# Patient Record
Sex: FEMALE | Race: BLACK OR AFRICAN AMERICAN | Employment: FULL TIME | URBAN - METROPOLITAN AREA
[De-identification: names, ages, dates, MRNs, and addresses within clinical notes are randomized per-mention and may not be internally consistent; named-entity substitution may affect disease eponyms.]

---

## 2020-01-13 ENCOUNTER — HOSPITAL ENCOUNTER (INPATIENT)
Age: 60
LOS: 8 days | Discharge: HOME | DRG: 287 | End: 2020-01-21
Attending: EMERGENCY MEDICINE | Admitting: INTERNAL MEDICINE
Payer: COMMERCIAL

## 2020-01-13 ENCOUNTER — APPOINTMENT (OUTPATIENT)
Dept: CT IMAGING | Age: 60
DRG: 287 | End: 2020-01-13
Payer: COMMERCIAL

## 2020-01-13 ENCOUNTER — APPOINTMENT (OUTPATIENT)
Dept: GENERAL RADIOLOGY | Age: 60
DRG: 287 | End: 2020-01-13
Payer: COMMERCIAL

## 2020-01-13 PROBLEM — J90 PLEURAL EFFUSION ON RIGHT: Status: ACTIVE | Noted: 2020-01-13

## 2020-01-13 PROBLEM — R03.0 ELEVATED BLOOD PRESSURE READING WITHOUT DIAGNOSIS OF HYPERTENSION: Status: ACTIVE | Noted: 2020-01-13

## 2020-01-13 PROBLEM — R73.9 HYPERGLYCEMIA: Status: ACTIVE | Noted: 2020-01-13

## 2020-01-13 PROBLEM — I50.9 ADMITTED FOR ACUTE CONGESTIVE HEART FAILURE (HCC): Status: ACTIVE | Noted: 2020-01-13

## 2020-01-13 LAB
ANION GAP SERPL CALCULATED.3IONS-SCNC: 15 MMOL/L (ref 7–16)
BASOPHILS ABSOLUTE: 0.04 E9/L (ref 0–0.2)
BASOPHILS RELATIVE PERCENT: 0.4 % (ref 0–2)
BUN BLDV-MCNC: 11 MG/DL (ref 6–20)
CALCIUM SERPL-MCNC: 9.2 MG/DL (ref 8.6–10.2)
CHLORIDE BLD-SCNC: 107 MMOL/L (ref 98–107)
CK MB: 3.9 NG/ML (ref 0–4.3)
CO2: 21 MMOL/L (ref 22–29)
CREAT SERPL-MCNC: 0.8 MG/DL (ref 0.5–1)
D DIMER: 280 NG/ML DDU
EKG ATRIAL RATE: 93 BPM
EKG P AXIS: 85 DEGREES
EKG P-R INTERVAL: 164 MS
EKG Q-T INTERVAL: 418 MS
EKG QRS DURATION: 140 MS
EKG QTC CALCULATION (BAZETT): 519 MS
EKG R AXIS: 134 DEGREES
EKG T AXIS: 2 DEGREES
EKG VENTRICULAR RATE: 93 BPM
EOSINOPHILS ABSOLUTE: 0.06 E9/L (ref 0.05–0.5)
EOSINOPHILS RELATIVE PERCENT: 0.7 % (ref 0–6)
GFR AFRICAN AMERICAN: >60
GFR NON-AFRICAN AMERICAN: >60 ML/MIN/1.73
GLUCOSE BLD-MCNC: 128 MG/DL (ref 74–99)
HBA1C MFR BLD: 5.6 % (ref 4–5.6)
HCT VFR BLD CALC: 41.4 % (ref 34–48)
HEMOGLOBIN: 13.3 G/DL (ref 11.5–15.5)
IMMATURE GRANULOCYTES #: 0.05 E9/L
IMMATURE GRANULOCYTES %: 0.5 % (ref 0–5)
LYMPHOCYTES ABSOLUTE: 1.22 E9/L (ref 1.5–4)
LYMPHOCYTES RELATIVE PERCENT: 13.2 % (ref 20–42)
MCH RBC QN AUTO: 28.4 PG (ref 26–35)
MCHC RBC AUTO-ENTMCNC: 32.1 % (ref 32–34.5)
MCV RBC AUTO: 88.3 FL (ref 80–99.9)
MONOCYTES ABSOLUTE: 0.47 E9/L (ref 0.1–0.95)
MONOCYTES RELATIVE PERCENT: 5.1 % (ref 2–12)
NEUTROPHILS ABSOLUTE: 7.37 E9/L (ref 1.8–7.3)
NEUTROPHILS RELATIVE PERCENT: 80.1 % (ref 43–80)
PDW BLD-RTO: 15.1 FL (ref 11.5–15)
PLATELET # BLD: 327 E9/L (ref 130–450)
PMV BLD AUTO: 10.7 FL (ref 7–12)
POTASSIUM REFLEX MAGNESIUM: 4 MMOL/L (ref 3.5–5)
PRO-BNP: 6998 PG/ML (ref 0–125)
RBC # BLD: 4.69 E12/L (ref 3.5–5.5)
SODIUM BLD-SCNC: 143 MMOL/L (ref 132–146)
TOTAL CK: 90 U/L (ref 20–180)
TROPONIN: <0.01 NG/ML (ref 0–0.03)
TROPONIN: <0.01 NG/ML (ref 0–0.03)
WBC # BLD: 9.2 E9/L (ref 4.5–11.5)

## 2020-01-13 PROCEDURE — 2140000000 HC CCU INTERMEDIATE R&B

## 2020-01-13 PROCEDURE — 85025 COMPLETE CBC W/AUTO DIFF WBC: CPT

## 2020-01-13 PROCEDURE — 80048 BASIC METABOLIC PNL TOTAL CA: CPT

## 2020-01-13 PROCEDURE — 6370000000 HC RX 637 (ALT 250 FOR IP): Performed by: EMERGENCY MEDICINE

## 2020-01-13 PROCEDURE — 36415 COLL VENOUS BLD VENIPUNCTURE: CPT

## 2020-01-13 PROCEDURE — 6360000002 HC RX W HCPCS: Performed by: INTERNAL MEDICINE

## 2020-01-13 PROCEDURE — 2580000003 HC RX 258: Performed by: EMERGENCY MEDICINE

## 2020-01-13 PROCEDURE — 71046 X-RAY EXAM CHEST 2 VIEWS: CPT

## 2020-01-13 PROCEDURE — 93005 ELECTROCARDIOGRAM TRACING: CPT | Performed by: EMERGENCY MEDICINE

## 2020-01-13 PROCEDURE — 82550 ASSAY OF CK (CPK): CPT

## 2020-01-13 PROCEDURE — 94640 AIRWAY INHALATION TREATMENT: CPT

## 2020-01-13 PROCEDURE — 6360000004 HC RX CONTRAST MEDICATION: Performed by: RADIOLOGY

## 2020-01-13 PROCEDURE — 6360000002 HC RX W HCPCS: Performed by: EMERGENCY MEDICINE

## 2020-01-13 PROCEDURE — 99222 1ST HOSP IP/OBS MODERATE 55: CPT | Performed by: INTERNAL MEDICINE

## 2020-01-13 PROCEDURE — 83036 HEMOGLOBIN GLYCOSYLATED A1C: CPT

## 2020-01-13 PROCEDURE — 93010 ELECTROCARDIOGRAM REPORT: CPT | Performed by: INTERNAL MEDICINE

## 2020-01-13 PROCEDURE — 83880 ASSAY OF NATRIURETIC PEPTIDE: CPT

## 2020-01-13 PROCEDURE — 71275 CT ANGIOGRAPHY CHEST: CPT

## 2020-01-13 PROCEDURE — 84484 ASSAY OF TROPONIN QUANT: CPT

## 2020-01-13 PROCEDURE — 94664 DEMO&/EVAL PT USE INHALER: CPT

## 2020-01-13 PROCEDURE — 6370000000 HC RX 637 (ALT 250 FOR IP): Performed by: INTERNAL MEDICINE

## 2020-01-13 PROCEDURE — 99285 EMERGENCY DEPT VISIT HI MDM: CPT

## 2020-01-13 PROCEDURE — 85378 FIBRIN DEGRADE SEMIQUANT: CPT

## 2020-01-13 PROCEDURE — APPSS60 APP SPLIT SHARED TIME 46-60 MINUTES: Performed by: NURSE PRACTITIONER

## 2020-01-13 PROCEDURE — 82553 CREATINE MB FRACTION: CPT

## 2020-01-13 RX ORDER — ALBUTEROL SULFATE 2.5 MG/3ML
2.5 SOLUTION RESPIRATORY (INHALATION) 4 TIMES DAILY
Status: DISCONTINUED | OUTPATIENT
Start: 2020-01-13 | End: 2020-01-21 | Stop reason: HOSPADM

## 2020-01-13 RX ORDER — METOPROLOL SUCCINATE 25 MG/1
25 TABLET, EXTENDED RELEASE ORAL DAILY
Status: DISCONTINUED | OUTPATIENT
Start: 2020-01-13 | End: 2020-01-16

## 2020-01-13 RX ORDER — AZITHROMYCIN 200 MG/5ML
POWDER, FOR SUSPENSION ORAL
Status: ON HOLD | COMMUNITY
End: 2020-01-21 | Stop reason: HOSPADM

## 2020-01-13 RX ORDER — ALBUTEROL SULFATE 90 UG/1
2 AEROSOL, METERED RESPIRATORY (INHALATION) EVERY 4 HOURS PRN
COMMUNITY
End: 2021-07-12

## 2020-01-13 RX ORDER — IPRATROPIUM BROMIDE AND ALBUTEROL SULFATE 2.5; .5 MG/3ML; MG/3ML
1 SOLUTION RESPIRATORY (INHALATION) ONCE
Status: COMPLETED | OUTPATIENT
Start: 2020-01-13 | End: 2020-01-13

## 2020-01-13 RX ORDER — FUROSEMIDE 10 MG/ML
20 INJECTION INTRAMUSCULAR; INTRAVENOUS ONCE
Status: COMPLETED | OUTPATIENT
Start: 2020-01-13 | End: 2020-01-13

## 2020-01-13 RX ORDER — DOCUSATE SODIUM 100 MG/1
100 CAPSULE, LIQUID FILLED ORAL NIGHTLY
Status: DISCONTINUED | OUTPATIENT
Start: 2020-01-13 | End: 2020-01-15 | Stop reason: SDUPTHER

## 2020-01-13 RX ORDER — ASPIRIN 81 MG/1
324 TABLET, CHEWABLE ORAL ONCE
Status: COMPLETED | OUTPATIENT
Start: 2020-01-13 | End: 2020-01-13

## 2020-01-13 RX ORDER — 0.9 % SODIUM CHLORIDE 0.9 %
1000 INTRAVENOUS SOLUTION INTRAVENOUS ONCE
Status: COMPLETED | OUTPATIENT
Start: 2020-01-13 | End: 2020-01-13

## 2020-01-13 RX ORDER — FUROSEMIDE 10 MG/ML
40 INJECTION INTRAMUSCULAR; INTRAVENOUS 2 TIMES DAILY
Status: DISCONTINUED | OUTPATIENT
Start: 2020-01-13 | End: 2020-01-14

## 2020-01-13 RX ORDER — ONDANSETRON 2 MG/ML
4 INJECTION INTRAMUSCULAR; INTRAVENOUS EVERY 6 HOURS PRN
Status: DISCONTINUED | OUTPATIENT
Start: 2020-01-13 | End: 2020-01-13

## 2020-01-13 RX ORDER — AMOXICILLIN 125 MG/5ML
20 POWDER, FOR SUSPENSION ORAL 2 TIMES DAILY
Status: ON HOLD | COMMUNITY
End: 2020-01-21 | Stop reason: HOSPADM

## 2020-01-13 RX ORDER — PANTOPRAZOLE SODIUM 40 MG/1
40 TABLET, DELAYED RELEASE ORAL
Status: DISCONTINUED | OUTPATIENT
Start: 2020-01-14 | End: 2020-01-15 | Stop reason: SDUPTHER

## 2020-01-13 RX ORDER — ACETAMINOPHEN 325 MG/1
650 TABLET ORAL EVERY 4 HOURS PRN
Status: DISCONTINUED | OUTPATIENT
Start: 2020-01-13 | End: 2020-01-16 | Stop reason: SDUPTHER

## 2020-01-13 RX ADMIN — IOPAMIDOL 75 ML: 755 INJECTION, SOLUTION INTRAVENOUS at 08:48

## 2020-01-13 RX ADMIN — ENOXAPARIN SODIUM 40 MG: 40 INJECTION SUBCUTANEOUS at 14:15

## 2020-01-13 RX ADMIN — METOPROLOL SUCCINATE 25 MG: 25 TABLET, EXTENDED RELEASE ORAL at 17:52

## 2020-01-13 RX ADMIN — IPRATROPIUM BROMIDE AND ALBUTEROL SULFATE 1 AMPULE: 2.5; .5 SOLUTION RESPIRATORY (INHALATION) at 09:20

## 2020-01-13 RX ADMIN — FUROSEMIDE 20 MG: 10 INJECTION, SOLUTION INTRAMUSCULAR; INTRAVENOUS at 10:33

## 2020-01-13 RX ADMIN — ASPIRIN 81 MG 324 MG: 81 TABLET ORAL at 10:32

## 2020-01-13 RX ADMIN — ALBUTEROL SULFATE 2.5 MG: 2.5 SOLUTION RESPIRATORY (INHALATION) at 19:56

## 2020-01-13 RX ADMIN — SODIUM CHLORIDE 1000 ML: 9 INJECTION, SOLUTION INTRAVENOUS at 07:46

## 2020-01-13 RX ADMIN — FUROSEMIDE 40 MG: 10 INJECTION, SOLUTION INTRAMUSCULAR; INTRAVENOUS at 18:28

## 2020-01-13 NOTE — LETTER
Elmer Bosstim  Phone: [phone]             January 14, 2020    Patient: Herson Graham   YOB: 1960   Date of Visit: 1/13/2020       To Whom It May Concern:    Herson Graham was admitted and in our facility beginning 1/13/2020 and is still under the care of the Doctor here. If you have any further questions feel free to contact me at [phone].       Sincerely,       Markel Moody RN         Signature:__________________________________

## 2020-01-13 NOTE — ED PROVIDER NOTES
HPI:  1/13/20, Time: 7:11 AM         Ramila Gonzalez is a 64 y.o. female presenting to the ED for cough, chest pain, shortness of breath, and body aches, beginning about a week ago. The complaint has been persistent, moderate in severity, and worsened by nothing. Patient presents the emergency department with cough, chest pain, shortness of breath, and body aches. She states that she has had the symptoms off and on for the last month but worse and consistently for the past week or so. She was seen at an urgent care center and was reportedly diagnosed with both influenza a and B as well as pneumonia. She was started on amoxicillin and azithromycin which she has been taking but she states that she is not feeling much better. She complains of some subjective fevers and chills but has not checked her temperature. She also complains of some nausea and diarrhea but no vomiting and no abdominal pain. Review of Systems:   Pertinent positives and negatives are stated within HPI, all other systems reviewed and are negative.          --------------------------------------------- PAST HISTORY ---------------------------------------------  Past Medical History:  has no past medical history on file. Past Surgical History:  has no past surgical history on file. Social History:  reports that she has never smoked. She has never used smokeless tobacco. She reports current alcohol use. She reports previous drug use. Family History: family history is not on file. The patients home medications have been reviewed. Allergies: Patient has no known allergies.     -------------------------------------------------- RESULTS -------------------------------------------------  All laboratory and radiology results have been personally reviewed by myself   LABS:  Results for orders placed or performed during the hospital encounter of 01/13/20   CBC Auto Differential   Result Value Ref Range    WBC 9.2 4.5 - 11.5 E9/L    RBC effusions and infiltrates   Mild airspace disease suggesting pulmonary edema               XR CHEST STANDARD (2 VW)   Final Result   Tortuous ectatic aorta   Cardiomegaly    Airspace disease compatible with atelectasis or pneumonia, at both   lung bases, likely with associated pleural effusion                       EKG Interpretation    Interpreted by emergency department physician    Rhythm: normal sinus   Rate: normal  Axis: right  Ectopy: none  Conduction: left bundle branch block (complete)  ST Segments: elevation in  v2 and v3  T Waves: inversion in  v6, III and aVf  Q Waves: v1, v2    Clinical Impression: Normal sinus rhythm, rate of 93, right axis deviation, complete left bundle branch block, borderline ST segment elevations in V2 and V3, Sgarbossa criteria negative, T wave inversions inferiorly and laterally, septal Q waves. No previous EKG for comparison. Ulysses Seip      ------------------------- NURSING NOTES AND VITALS REVIEWED ---------------------------   The nursing notes within the ED encounter and vital signs as below have been reviewed. BP (!) 130/103   Pulse 93   Temp 97.7 °F (36.5 °C)   Resp 20   Ht 5' 4\" (1.626 m)   Wt 125 lb (56.7 kg)   SpO2 98%   BMI 21.46 kg/m²   Oxygen Saturation Interpretation: Normal      ---------------------------------------------------PHYSICAL EXAM--------------------------------------      Constitutional/General: Alert and oriented x3, well appearing, non toxic in NAD  Head: Normocephalic and atraumatic  Eyes: PERRL, EOMI  Mouth: Oropharynx clear, handling secretions, no trismus  Neck: Supple, full ROM,   Pulmonary: Lungs clear to auscultation bilaterally, no wheezes, rales, or rhonchi. Not in respiratory distress  Cardiovascular: Tachycardia, regular rhythm, no murmurs, gallops, or rubs. 2+ distal pulses  Abdomen: Soft, non tender, non distended,   Extremities: Moves all extremities x 4. Warm and well perfused.   No calf erythema, edema, or to be treated with Tamiflu and therefore will not be tested for influenza. Counseling: The emergency provider has spoken with the patient and discussed todays results, in addition to providing specific details for the plan of care and counseling regarding the diagnosis and prognosis. Questions are answered at this time and they are agreeable with the plan.      --------------------------------- IMPRESSION AND DISPOSITION ---------------------------------    IMPRESSION  1. New onset of congestive heart failure ([facility] [address]    2.  Abnormal EKG        DISPOSITION  Disposition: Admit to telemetry  Patient condition is stable       Rajendra Araiza DO  01/13/20 0930

## 2020-01-13 NOTE — H&P
alcohol use. Family History:     Positive as follows:        Problem Relation Age of Onset    Stroke Mother        REVIEW OF SYSTEMS:   Pertinent positives as noted in the HPI. All other systems reviewed and negative. PHYSICAL EXAM:    /83   Pulse 92   Temp 97.7 °F (36.5 °C)   Resp 19   Ht 5' 4\" (1.626 m)   Wt 125 lb (56.7 kg)   SpO2 99%   BMI 21.46 kg/m²     General appearance:  No apparent distress, appears stated age and cooperative. HEENT:  Normal cephalic, atraumatic without obvious deformity. Pupils equal, round, and reactive to light. Extra ocular muscles intact. Conjunctivae/corneas clear. Neck: Supple, with full range of motion. No jugular venous distention. Trachea midline. Respiratory:  Normal respiratory effort. Clear to auscultation, bilaterally without Rales/Wheezes/Rhonchi. Cardiovascular:  Regular rate and rhythm with normal S1/S2 without murmurs, rubs or gallops. Abdomen: Soft, non-tender, non-distended with normal bowel sounds. Musculoskeletal:  No clubbing, cyanosis or edema bilaterally. Full range of motion without deformity. Skin: Skin color, texture, turgor normal.  No rashes or lesions. Neurologic:  Neurovascularly intact without any focal sensory/motor deficits  Psychiatric:  Alert and oriented, thought content appropriate, normal insight  Capillary Refill: Brisk,< 3 seconds   Peripheral Pulses: +2 palpable, equal bilaterally       CXR:  I have reviewed the CXR with the following interpretation: pleural effusion on right  EKG:  I have reviewed the EKG with the following interpretation: NSR    Labs:     Recent Labs     01/13/20  0738   WBC 9.2   HGB 13.3   HCT 41.4        Recent Labs     01/13/20  0738      K 4.0      CO2 21*   BUN 11   CREATININE 0.8   CALCIUM 9.2     No results for input(s): AST, ALT, BILIDIR, BILITOT, ALKPHOS in the last 72 hours. No results for input(s): INR in the last 72 hours.   Recent Labs     01/13/20  0286 TROPONINI <0.01       Urinalysis:    No results found for: NITRU, [address], BACTERIA, RBCUA, BLOODU, SPECGRAV, GLUCOSEU    CT chest  Cardiomegaly with small pericardial effusion, the left ventricle is  dilated, the overall appearance of the chest suggest congestive heart  failure  Bilateral pleural effusions and infiltrates  Mild airspace disease suggesting pulmonary edema    ASSESSMENT:    Active Hospital Problems    Diagnosis Date Noted    Admitted for acute congestive heart failure ([facility] [address] [I50.9] 01/13/2020    Hyperglycemia [R73.9] 01/13/2020    Pleural effusion on right [J90] 01/13/2020    Elevated blood pressure reading without diagnosis of hypertension [R03.0] 01/13/2020       PLAN:  Monitor labs  monitor BP  Consult cardiology  Daily weights  I/Os  Echo    DVT Prophylaxis: lovenox  Diet: DIET CARDIAC; Low Sodium (2 GM)  Code Status: Full Code    PT/OT Eval Status: n/a    Dispo - admit telemetry       Kamila Dias CNP    Thank you Eugenie Hanna MD for the opportunity to be involved in this patient's care. If you have any questions or concerns please feel free to contact me via [address] [place] [address]. Hospitalist Attestation Note     The patient was seen in conjunction  and collaboration with the nurse practitioner with independent [facility] [address] was seen and examined independently by myself and, after review,  I agree with the history, physical, and assessment documented by the mid-level above  the note which has been adjusted to reflect my findings, with the addition of the following:      Subjective:    pt had been relatively healthy, noticed she was starting to become SOB, coughing, non productive, nauseated, with chills, and had CP located ACW pressure in character, non radiating. She also noticed that she was gaining weight in her stomach.        Physical Exam:  HRRR  LUNGS BILATERAL WHEEZING   ABD:SOFT POS   Extrem: NO EDEMA  NEURO:CN2-12 INTACT      Assessment  ACUTE CHF, ECHO PENDING

## 2020-01-13 NOTE — ED NOTES
Bed: 13  Expected date:   Expected time:   Means of arrival:   Comments:  triage     Tiana Moses RN  01/13/20 0272

## 2020-01-13 NOTE — LETTER
Elmer Mcgarry  Phone: [phone]             January 21, 2020    Patient: Bala Rock   YOB: 1960   Date of Visit: 1/13/2020       To Whom It May Concern:    Bala Rock was seen and treated in our facility  beginning 1/13/2020 until 1/21/2020. She has a follow up cardiology office appointment on 1/28/2020, return to work date will be determined at this appointment.      Sincerely,       RAJESH Alvarez CNP         Signature:__________________________________

## 2020-01-13 NOTE — CONSULTS
Inpatient Cardiology Consultation      Reason for Consult:  CHF    Consulting Physician: Dr. Miah Sun     Requesting Physician: Dr. Zach Wolf    Date of Consultation: 1/13/2020    HISTORY OF PRESENT ILLNESS:   Ms. Josafat Torres is a 56-year-old female who is not known to [facility] cardiology. PMH: Vitamin D deficiency. She has no known CAD, hypertension, hyperlipidemia, blood clots, CKD or diabetes. Presented to the ED on 1/13/2020 with complaints of ongoing shortness of breath (now unable to ambulate up 5 steps without feeling exhausted and short of breath), weakness, fatigue and a dry nonproductive cough x 1-1.5 months. Denies weight gain, lower extremity swelling, orthopnea. She has a poor appetite and over the past month has had intermittent episodes of dull \"pressure\" underneath both breasts with exertional activity, and laying flat in bed, lasting 15 minutes up to 30 minutes. 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 She was exposed to one patient who was recently diagnosed with pneumonia. She was seen at a local urgent care 1 week ago and was treated for pneumonia. She was prescribed steroids and antibiotics and has had no improvement for the past 4 days. Due to worsening NELSON, she presented to the ED for further evaluation. On arrival to the ED: Blood pressure 146/99, heart rate 104, RR 18, afebrile, 98% on room air. Labs: proBNP 6998, troponin<0.01 x 1, BUN 11, creatinine 0.8, WBC 9.2, H/H stable, platelet count 958. D-dimer 280. Echocardiogram-ordered and is pending. Chest x-ray cardiomegaly, airspace disease compatible with atelectasis or pneumonia, at lung bases, likely associated with pleural effusions. CTA chest with contrast: Cardiomegaly with small pericardial effusion, the left ventricle is dilated and overall appearance of the chest suggests CHF, bilateral pleural effusions and infiltrates, mild airspace disease suggesting pulmonary edema.   ED medications: Aspirin 324 mg, Lasix 20 mg IV x1 and DuoNeb. She was started on Lasix 40 mg IV twice daily (next dose 1800 on 1/13/2020). Cardiology was asked to see the patient in the emergency department for possible CHF. Please note: past medical records were reviewed per electronic medical record (EMR) - see detailed reports under Past Medical/ Surgical History. Past Medical History:    1. Lifelong non-smoker  2. Vitamin D deficiency  3. Chronic constipation  4. Left bundle branch block (noted on EKG 1/13/2020-no prior EKG for comparison)    Medications Prior to admit:  Prior to Admission medications    Medication Sig Start Date End Date Taking? Authorizing Provider   albuterol sulfate HFA (PROAIR HFA) 108 (90 Base) MCG/ACT inhaler Inhale 2 puffs into the lungs every 4 hours as needed   Yes Historical Provider, MD   amoxicillin (AMOXIL) 125 MG/5ML suspension Take 20 mLs by mouth 2 times daily   Yes Historical Provider, MD   azithromycin (ZITHROMAX) 200 MG/5ML suspension Take by mouth 12. 5mL for initial dose, then 6.25 mL daily x 4 days   Yes Historical Provider, MD       Current Medications:    Current Facility-Administered Medications: perflutren lipid microspheres (DEFINITY) injection 1.65 mg, 1.5 mL, Intravenous, ONCE PRN  furosemide (LASIX) injection 40 mg, 40 mg, Intravenous, BID  docusate sodium (COLACE) capsule 100 mg, 100 mg, Oral, Nightly  albuterol (PROVENTIL) nebulizer solution 2.5 mg, 2.5 mg, Nebulization, 4x daily  [START ON 1/14/2020] pantoprazole (PROTONIX) tablet 40 mg, 40 mg, Oral, QAM AC  enoxaparin (LOVENOX) injection 40 mg, 40 mg, Subcutaneous, Daily  ondansetron (ZOFRAN) injection 4 mg, 4 mg, Intravenous, Q6H PRN  acetaminophen (TYLENOL) tablet 650 mg, 650 mg, Oral, Q4H PRN    Allergies:  Patient has no known allergies.     Social History:    Patient lives alone  Drinks 1 to 2 glasses of wine per month  Denies tobacco and illicit drug use  Works full-time caring for special needs patients    Family History: Denies family history significant for coronary artery disease  Mother: , history of stroke  Father: History of liver disease,     REVIEW OF SYSTEMS:     · Constitutional: + Fatigue. Denies fevers, chills or night sweats  · Eyes: Denies visual changes or drainage  · ENT: Denies headaches or hearing loss. No mouth sores or sore throat. No epistaxis   · Cardiovascular: See HPI. No lower extremity swelling. · Respiratory: + NELSON, dry nonproductive cough, denies orthopnea or PND. No hemoptysis   · Gastrointestinal: Denies hematemesis or anorexia. No hematochezia or melena    · Genitourinary: Denies urgency, dysuria or hematuria. · Musculoskeletal: Denies gait disturbance, + generalized weakness. Denies joint complaints  · Integumentary: Denies rash, hives or pruritis   · Neurological: Occasional dizziness with positional changes. Denies headaches or seizures. No numbness or tingling  · Psychiatric: + Anxiety. Denies depression. · Endocrine: Denies temperature intolerance. No recent weight change. .  · Hematologic/Lymphatic: Denies abnormal bruising or bleeding. No swollen lymph nodes    PHYSICAL EXAM:   BP (!) 135/101   Pulse 108   Temp 97.6 °F (36.4 °C) (Oral)   Resp 20   Ht 5' 4\" (1.626 m)   Wt 125 lb (56.7 kg)   SpO2 96%   BMI 21.46 kg/m²   CONST:  Well developed, well nourished middle-aged AAF who appears of stated age. Awake, alert and cooperative. No apparent distress. HEENT:   Head- Normocephalic, atraumatic   Eyes- Conjunctivae pink, anicteric  Throat- Oral mucosa pink and moist  Neck-  No stridor, trachea midline, no jugular venous distention. No carotid bruit. CHEST: Chest symmetrical and non-tender to palpation. No accessory muscle use or intercostal retractions  RESPIRATORY: Lung sounds -expiratory wheeze. No Rales or rhonchi.   CARDIOVASCULAR:     Heart Inspection- shows no noted pulsations  Heart Palpation- no heaves or thrills; PMI is present cardiovascular assessment, and laboratory studies were reviewed. The patient is a 57-year-old black female with no association to [employer] and no known structural heart disease. She has no known significant past medical history and until 1 month earlier was active without cardiovascular limitations. Within the past month, she initially noted the onset of a nonproductive cough with the subsequent development of progressive exertional limitations secondary to exertional dyspnea and fatigue. Intermittently, she additionally has noted episodes of a somewhat ill-defined precordial chest pressure both with activity and while lying supine without additional radiation associated ischemic equivalents and without additional significant manifestations of decompensated left ventricular systolic dysfunction or volume overload. At the time of her emergency room assessment, electrocardiogram demonstrated evidence of sinus rhythm with left posterior fascicular block and left bundle branch block conduction patterns with normal cardiac biomarkers and a chest x-ray suggested a megaly with mild interstitial infiltrates and a contrast CT angiogram additionally demonstrated evidence of a small pericardial effusion. Additional laboratory studies include that of a proBNP level of 7000 pg/mL. On examination, the patient presently appears in no discomfort no distress. She is hemodynamically stable with vital signs as documented. Jugular venous pressure appears approximately 7 cm with no carotid bruits. A prolonged expiratory phase of respiration is present with mild bronchospasm and basilar rales predominately in the left lung base. Cardiac examination is notable for a regular tachycardia with a summation gallop and no appreciated cardiac murmur. A benign abdominal examination is present no significant peripheral edema is identified. Diagnostic Assessment and Plan:  On a clinical basis, the patient presents

## 2020-01-14 LAB
ANION GAP SERPL CALCULATED.3IONS-SCNC: 18 MMOL/L (ref 7–16)
BUN BLDV-MCNC: 13 MG/DL (ref 6–20)
CALCIUM SERPL-MCNC: 8.8 MG/DL (ref 8.6–10.2)
CHLORIDE BLD-SCNC: 103 MMOL/L (ref 98–107)
CK MB: 4.1 NG/ML (ref 0–4.3)
CO2: 21 MMOL/L (ref 22–29)
CREAT SERPL-MCNC: 0.8 MG/DL (ref 0.5–1)
GFR AFRICAN AMERICAN: >60
GFR NON-AFRICAN AMERICAN: >60 ML/MIN/1.73
GLUCOSE BLD-MCNC: 145 MG/DL (ref 74–99)
LV EF: 15 %
LVEF MODALITY: NORMAL
POTASSIUM SERPL-SCNC: 3.2 MMOL/L (ref 3.5–5)
SODIUM BLD-SCNC: 142 MMOL/L (ref 132–146)
TOTAL CK: 100 U/L (ref 20–180)
TROPONIN: <0.01 NG/ML (ref 0–0.03)

## 2020-01-14 PROCEDURE — 6370000000 HC RX 637 (ALT 250 FOR IP): Performed by: INTERNAL MEDICINE

## 2020-01-14 PROCEDURE — 84484 ASSAY OF TROPONIN QUANT: CPT

## 2020-01-14 PROCEDURE — 94640 AIRWAY INHALATION TREATMENT: CPT

## 2020-01-14 PROCEDURE — 80048 BASIC METABOLIC PNL TOTAL CA: CPT

## 2020-01-14 PROCEDURE — 99232 SBSQ HOSP IP/OBS MODERATE 35: CPT | Performed by: INTERNAL MEDICINE

## 2020-01-14 PROCEDURE — 97165 OT EVAL LOW COMPLEX 30 MIN: CPT

## 2020-01-14 PROCEDURE — 82550 ASSAY OF CK (CPK): CPT

## 2020-01-14 PROCEDURE — 2140000000 HC CCU INTERMEDIATE R&B

## 2020-01-14 PROCEDURE — 6360000002 HC RX W HCPCS: Performed by: INTERNAL MEDICINE

## 2020-01-14 PROCEDURE — 82553 CREATINE MB FRACTION: CPT

## 2020-01-14 PROCEDURE — 97161 PT EVAL LOW COMPLEX 20 MIN: CPT

## 2020-01-14 PROCEDURE — 93306 TTE W/DOPPLER COMPLETE: CPT

## 2020-01-14 PROCEDURE — 36415 COLL VENOUS BLD VENIPUNCTURE: CPT

## 2020-01-14 RX ORDER — FUROSEMIDE 40 MG/1
40 TABLET ORAL DAILY
Status: DISCONTINUED | OUTPATIENT
Start: 2020-01-14 | End: 2020-01-15

## 2020-01-14 RX ADMIN — POTASSIUM BICARBONATE 40 MEQ: 782 TABLET, EFFERVESCENT ORAL at 08:36

## 2020-01-14 RX ADMIN — ENOXAPARIN SODIUM 40 MG: 40 INJECTION SUBCUTANEOUS at 08:35

## 2020-01-14 RX ADMIN — METOPROLOL SUCCINATE 25 MG: 25 TABLET, EXTENDED RELEASE ORAL at 08:36

## 2020-01-14 RX ADMIN — PANTOPRAZOLE SODIUM 40 MG: 40 TABLET, DELAYED RELEASE ORAL at 05:30

## 2020-01-14 RX ADMIN — ALBUTEROL SULFATE 2.5 MG: 2.5 SOLUTION RESPIRATORY (INHALATION) at 14:24

## 2020-01-14 RX ADMIN — POTASSIUM BICARBONATE 40 MEQ: 782 TABLET, EFFERVESCENT ORAL at 22:25

## 2020-01-14 RX ADMIN — ALBUTEROL SULFATE 2.5 MG: 2.5 SOLUTION RESPIRATORY (INHALATION) at 21:46

## 2020-01-14 RX ADMIN — FUROSEMIDE 40 MG: 40 TABLET ORAL at 08:36

## 2020-01-14 RX ADMIN — ALBUTEROL SULFATE 2.5 MG: 2.5 SOLUTION RESPIRATORY (INHALATION) at 11:17

## 2020-01-14 ASSESSMENT — PAIN SCALES - GENERAL: PAINLEVEL_OUTOF10: 0

## 2020-01-14 NOTE — PROGRESS NOTES
Hospitalist Progress Note      PCP: Mansoor Desir MD    Date of Admission: 1/13/2020    Chief Complaint: [facility] Course: *pt had been relatively healthy, noticed she was starting to become SOB, coughing, non productive, nauseated, with chills, and had CP located ACW pressure in character, non radiating. She also noticed that she was gaining weight in her stomach. found to be in CHF, echo pending, diuresing    **     Subjective: feeling better *       Medications:  Reviewed    Infusion Medications   Scheduled Medications    furosemide  40 mg Oral Daily    potassium bicarb-citric acid  40 mEq Oral BID    docusate sodium  100 mg Oral Nightly    albuterol  2.5 mg Nebulization 4x daily    pantoprazole  40 mg Oral QAM AC    enoxaparin  40 mg Subcutaneous Daily    metoprolol succinate  25 mg Oral Daily     PRN Meds: perflutren lipid microspheres, acetaminophen      Intake/Output Summary (Last 24 hours) at 1/14/2020 1405  Last data filed at 1/14/2020 1257  Gross per 24 hour   Intake 580 ml   Output 540 ml   Net 40 ml       Exam:    /81   Pulse 84   Temp 98.2 °F (36.8 °C) (Temporal)   Resp 19   Ht 5' 4\" (1.626 m)   Wt 122 lb (55.3 kg)   SpO2 100%   BMI 20.94 kg/m²           Gen:  Well developed   HEENT: NC/AT, moist mucous membranes, no oropharyngeal erythema or exudate  Neck: supple, trachea midline, no anterior cervical or SC LAD  Heart:  Normal s1/s2, RRR, no murmurs, gallops, or rubs. Lungs:   cta * bilaterally, *  Abd: bowel sounds present, soft, nontender, nondistended, no masses  Extrem:  No clubbing, cyanosis,  no* edema  Skin: no rashes or lesions  Psych: A & O x3  Neuro: grossly intact, moves all four extremities.     Capillary Refill: Brisk,< 3 seconds   Peripheral Pulses: +2 palpable, equal bilaterally               Labs:   Recent Labs     01/13/20  0738   WBC 9.2   HGB 13.3   HCT 41.4        Recent Labs     01/13/20  0738 01/14/20  0531    142   K 4.0 3.2*

## 2020-01-14 NOTE — PROGRESS NOTES
327 01/13/2020    MPV 10.7 01/13/2020     No results for input(s): ALKPHOS, ALT, AST, PROT, BILITOT, BILIDIR, LABALBU in the last 72 hours. No results found for: MG  No results found for: PROTIME, INR  No results found for: TSH  No components found for: CHLPL  No results found for: TRIG  No results found for: HDL  No results found for: [address]    Cardiac Tests:  Telemetry findings reviewed: sinus rhythm, no new tachy/bradyarrhythmias overnight      ASSESSMENT / PLAN: On a clinical basis, the patient is subjectively improved following limited fluid mobilization initiation of medical therapy for presumed systolic dysfunction with her echocardiogram pending. Presently, conversion to oral diuretics with potassium supplementation will be initiated and the continuation of remaining components of her medical regimen pending the review of her echocardiogram with further optimization of medical therapy based on findings. Following additional stabilization of her cardiovascular status, ischemic assessment will be anticipated to be determined by the findings of her echocardiogram, particularly that of the magnitude of potential left ventricular systolic dysfunction in the presence or absence of regional wall motion abnormalities. To need appropriate risk factor modification of blood pressure and serum lipids will be necessary to reduce risk of future atherosclerotic development. Note: This report was completed utilizing computer voice recognition software. Every effort has been made to ensure accuracy, however; inadvertent computerized transcription errors may be present. Abi Barnett.  Carter Trujillo, [facility] Card[facility]

## 2020-01-15 LAB
ADENOVIRUS BY PCR: NOT DETECTED
AMPHETAMINE SCREEN, URINE: NOT DETECTED
ANION GAP SERPL CALCULATED.3IONS-SCNC: 17 MMOL/L (ref 7–16)
BARBITURATE SCREEN URINE: NOT DETECTED
BENZODIAZEPINE SCREEN, URINE: NOT DETECTED
BORDETELLA PARAPERTUSSIS BY PCR: NOT DETECTED
BORDETELLA PERTUSSIS BY PCR: NOT DETECTED
BUN BLDV-MCNC: 16 MG/DL (ref 6–20)
CALCIUM SERPL-MCNC: 8.9 MG/DL (ref 8.6–10.2)
CANNABINOID SCREEN URINE: NOT DETECTED
CHLAMYDOPHILIA PNEUMONIAE BY PCR: NOT DETECTED
CHLORIDE BLD-SCNC: 101 MMOL/L (ref 98–107)
CO2: 24 MMOL/L (ref 22–29)
COCAINE METABOLITE SCREEN URINE: NOT DETECTED
CORONAVIRUS 229E BY PCR: NOT DETECTED
CORONAVIRUS HKU1 BY PCR: NOT DETECTED
CORONAVIRUS NL63 BY PCR: NOT DETECTED
CORONAVIRUS OC43 BY PCR: NOT DETECTED
CREAT SERPL-MCNC: 0.8 MG/DL (ref 0.5–1)
FENTANYL SCREEN, URINE: NOT DETECTED
FERRITIN: 146 NG/ML
GFR AFRICAN AMERICAN: >60
GFR NON-AFRICAN AMERICAN: >60 ML/MIN/1.73
GLUCOSE BLD-MCNC: 130 MG/DL (ref 74–99)
HUMAN METAPNEUMOVIRUS BY PCR: NOT DETECTED
HUMAN RHINOVIRUS/ENTEROVIRUS BY PCR: NOT DETECTED
INFLUENZA A BY PCR: NOT DETECTED
INFLUENZA B BY PCR: NOT DETECTED
IRON SATURATION: 40 % (ref 15–50)
IRON: 112 MCG/DL (ref 37–145)
Lab: NORMAL
METHADONE SCREEN, URINE: NOT DETECTED
MYCOPLASMA PNEUMONIAE BY PCR: NOT DETECTED
OPIATE SCREEN URINE: NOT DETECTED
OXYCODONE URINE: NOT DETECTED
PARAINFLUENZA VIRUS 1 BY PCR: NOT DETECTED
PARAINFLUENZA VIRUS 2 BY PCR: NOT DETECTED
PARAINFLUENZA VIRUS 3 BY PCR: NOT DETECTED
PARAINFLUENZA VIRUS 4 BY PCR: NOT DETECTED
PHENCYCLIDINE SCREEN URINE: NOT DETECTED
POTASSIUM SERPL-SCNC: 3.9 MMOL/L (ref 3.5–5)
RESPIRATORY SYNCYTIAL VIRUS BY PCR: NOT DETECTED
SODIUM BLD-SCNC: 142 MMOL/L (ref 132–146)
TOTAL IRON BINDING CAPACITY: 278 MCG/DL (ref 250–450)
TRANSFERRIN: 233 MG/DL (ref 200–360)

## 2020-01-15 PROCEDURE — 36415 COLL VENOUS BLD VENIPUNCTURE: CPT

## 2020-01-15 PROCEDURE — 80048 BASIC METABOLIC PNL TOTAL CA: CPT

## 2020-01-15 PROCEDURE — 6360000002 HC RX W HCPCS: Performed by: INTERNAL MEDICINE

## 2020-01-15 PROCEDURE — 94761 N-INVAS EAR/PLS OXIMETRY MLT: CPT

## 2020-01-15 PROCEDURE — APPSS180 APP SPLIT SHARED TIME > 60 MINUTES: Performed by: NURSE PRACTITIONER

## 2020-01-15 PROCEDURE — 2500000003 HC RX 250 WO HCPCS: Performed by: NURSE PRACTITIONER

## 2020-01-15 PROCEDURE — 83540 ASSAY OF IRON: CPT

## 2020-01-15 PROCEDURE — 6370000000 HC RX 637 (ALT 250 FOR IP): Performed by: INTERNAL MEDICINE

## 2020-01-15 PROCEDURE — 99222 1ST HOSP IP/OBS MODERATE 55: CPT | Performed by: INTERNAL MEDICINE

## 2020-01-15 PROCEDURE — 0100U HC RESPIRPTHGN MULT REV TRANS & AMP PRB TECH 21 TRGT: CPT

## 2020-01-15 PROCEDURE — 84466 ASSAY OF TRANSFERRIN: CPT

## 2020-01-15 PROCEDURE — 2140000000 HC CCU INTERMEDIATE R&B

## 2020-01-15 PROCEDURE — 94760 N-INVAS EAR/PLS OXIMETRY 1: CPT

## 2020-01-15 PROCEDURE — 82728 ASSAY OF FERRITIN: CPT

## 2020-01-15 PROCEDURE — 83550 IRON BINDING TEST: CPT

## 2020-01-15 PROCEDURE — 80307 DRUG TEST PRSMV CHEM ANLYZR: CPT

## 2020-01-15 PROCEDURE — 94640 AIRWAY INHALATION TREATMENT: CPT

## 2020-01-15 PROCEDURE — 99233 SBSQ HOSP IP/OBS HIGH 50: CPT | Performed by: INTERNAL MEDICINE

## 2020-01-15 RX ORDER — DOCUSATE SODIUM 50 MG/5ML
100 LIQUID ORAL NIGHTLY
Status: DISCONTINUED | OUTPATIENT
Start: 2020-01-15 | End: 2020-01-21 | Stop reason: HOSPADM

## 2020-01-15 RX ORDER — PANTOPRAZOLE SODIUM 40 MG/1
40 GRANULE, DELAYED RELEASE ORAL
Status: DISCONTINUED | OUTPATIENT
Start: 2020-01-16 | End: 2020-01-21 | Stop reason: HOSPADM

## 2020-01-15 RX ORDER — BUMETANIDE 0.25 MG/ML
1 INJECTION, SOLUTION INTRAMUSCULAR; INTRAVENOUS 2 TIMES DAILY
Status: DISCONTINUED | OUTPATIENT
Start: 2020-01-15 | End: 2020-01-16

## 2020-01-15 RX ORDER — ZOLPIDEM TARTRATE 5 MG/1
5 TABLET ORAL NIGHTLY PRN
Status: DISCONTINUED | OUTPATIENT
Start: 2020-01-15 | End: 2020-01-21 | Stop reason: HOSPADM

## 2020-01-15 RX ORDER — LISINOPRIL 5 MG/1
2.5 TABLET ORAL DAILY
Status: DISCONTINUED | OUTPATIENT
Start: 2020-01-15 | End: 2020-01-15

## 2020-01-15 RX ADMIN — ENOXAPARIN SODIUM 40 MG: 40 INJECTION SUBCUTANEOUS at 08:37

## 2020-01-15 RX ADMIN — METOPROLOL SUCCINATE 25 MG: 25 TABLET, EXTENDED RELEASE ORAL at 08:36

## 2020-01-15 RX ADMIN — LISINOPRIL 2.5 MG: 5 TABLET ORAL at 08:36

## 2020-01-15 RX ADMIN — PANTOPRAZOLE SODIUM 40 MG: 40 TABLET, DELAYED RELEASE ORAL at 06:13

## 2020-01-15 RX ADMIN — FUROSEMIDE 40 MG: 40 TABLET ORAL at 08:36

## 2020-01-15 RX ADMIN — ALBUTEROL SULFATE 2.5 MG: 2.5 SOLUTION RESPIRATORY (INHALATION) at 11:53

## 2020-01-15 RX ADMIN — POTASSIUM BICARBONATE 40 MEQ: 782 TABLET, EFFERVESCENT ORAL at 08:37

## 2020-01-15 RX ADMIN — ZOLPIDEM TARTRATE 5 MG: 5 TABLET ORAL at 22:49

## 2020-01-15 RX ADMIN — ALBUTEROL SULFATE 2.5 MG: 2.5 SOLUTION RESPIRATORY (INHALATION) at 16:48

## 2020-01-15 RX ADMIN — BUMETANIDE 1 MG: 0.25 INJECTION, SOLUTION INTRAMUSCULAR; INTRAVENOUS at 22:50

## 2020-01-15 RX ADMIN — ALBUTEROL SULFATE 2.5 MG: 2.5 SOLUTION RESPIRATORY (INHALATION) at 19:55

## 2020-01-15 RX ADMIN — ALBUTEROL SULFATE 2.5 MG: 2.5 SOLUTION RESPIRATORY (INHALATION) at 08:39

## 2020-01-15 ASSESSMENT — PAIN SCALES - GENERAL
PAINLEVEL_OUTOF10: 0
PAINLEVEL_OUTOF10: 0

## 2020-01-15 NOTE — CONSULTS
7 yrs  Heavy drinking >10 yrs     Performance enhancing drugs: ephedra,anabolic steroids, methamphetamines,methylphenidate, cocaine     History cancer/chemotherapy/radiation: anthracyclines, dose and duration, iron-chelating agents; trastuzumab; cyclophosphamide; taxoids; 5FU; interferons; mitomycin-C     Rheumatologic disease: pericarditis history; myocarditis history; chloroquine use;scleroderma/RA/SLE     Pregnancy: GXPX     Thiamine deficiency: anorexia nervosa,pregnancy,AIDS,alcoholism     L-carnitine deficiency, skeletal myopathy     Exposure to hypersensitivity inducing agents/presence of eosinophilia: sulfonamides, methyldopa, amphotericin B,streptomycin     Travel to [facility] or [facility]     Joint replacement, exposure to cobalt     Hemochromatosis risk factors:  Familial,DM/hepatomegaly,beta thalassemia/NE descent     Pyschiatric history: clozapine use     Sarcoidosis     Amyloidosis     Stress induced     Acromegaly         Past medical, surgical, family and social histories have been reviewed. Any changes in the past medical history, social history or family history have been made and are reflected in the electronic medical record. Patient Active Problem List    Diagnosis Date Noted    Hypokalemia     Admitted for acute congestive heart failure ([facility] [address] 2020    Hyperglycemia 2020    Pleural effusion on right 2020    Elevated blood pressure reading without diagnosis of hypertension 2020     Past Medical History:    1. Lifelong non-smoker  2. Vitamin D deficiency  3. Chronic constipation  4.   Left bundle branch block (noted on EKG 2020-no prior EKG for comparison)    Social History:    Patient lives alone  Drinks 1 to 2 glasses of wine per month  Denies tobacco and illicit drug use  Works full-time caring for special needs patients     Family History: Denies family history significant for coronary artery disease  Mother: , history of stroke  Father: History of liver disease,       No Known Allergies        Outpatient Medications [facility]ed as [facility])   Medication Sig Dispense Refill    albuterol sulfate HFA (PROAIR HFA) 108 (90 Base) MCG/ACT inhaler Inhale 2 puffs into the lungs every 4 hours as needed      amoxicillin (AMOXIL) 125 MG/5ML suspension Take 20 mLs by mouth 2 times daily      azithromycin (ZITHROMAX) 200 MG/5ML suspension Take by mouth 12. 5mL for initial dose, then 6.25 mL daily x 4 days         Guideline directed medical/device therapy:  ARNI/ACE I/ARB: Yes  Beta blocker: Yes  Aldosterone antagonist:  No  ICD/CRT-P/-D:  none  QRS interval on recent ECG (personally reviewed/interpreted): 120< but < 150 ms  Percentage RV pacing (personally reviewed/interpreted): %/NA    Review of Systems:   Cardiac: As per HPI  General: No fever, chills, rigors  Pulmonary: As per HPI  HEENT: No visual disturbances, difficult swallowing  GI: No nausea, vomiting, abdominal pain  : No dysuria or hematuria  Endocrine: No thyroid disease or diabetes  Musculoskeletal: VELEZ x 4, no focal motor deficits  Skin: Intact, no rashes  Neuro/Psych: No headache or seizures    Weights: Wt Readings from Last 3 Encounters:   01/15/20 121 lb 8 oz (55.1 kg)         Physical Examination:     /86   Pulse 82   Temp 98 °F (36.7 °C) (Temporal)   Resp 18   Ht 5' 4\" (1.626 m)   Wt 121 lb 8 oz (55.1 kg)   SpO2 95%   BMI 20.86 kg/m²     CONSTITUTIONAL: Alert and oriented times 3, no acute distress and cooperative to examination with proper mood and affect. SKIN: Skin color, texture, turgor normal. No rashes or lesions. LYMPH: no cervical nodes, no inguinal nodes  HEENT: Head is normocephalic, atraumatic. EOMI, PERRLA. NECK: Supple, symmetrical, trachea midline, no adenopathy, thyroid symmetric, not enlarged and no tenderness, skin normal. + jvd/hjr.    CHEST/LUNGS: chest symmetric with normal A/P diameter, normal respiratory rate and diastolic dysfunction. The left atrium is severely dilated. Right ventricle global systolic function is reduced. Severe posterior mitral regurgitation is present. Mild tricuspid regurgitation. There is a small circumferential pericardial effusion noted. EKG  Sinus rhythm  Right axis deviation  LBBB           I have reviewed the notes, assessments, and/or procedures performed by Gabriel Yoder NP, I interviewed and examined the patient, and discussed her case with Dr Orly Mello, I concur with her/his documentation of Janeice Severs. ASSESSMENT:  1. Acute HFrEF - index hospitalization  2. ACC stage C / NYHA class II - III  3. Hypervolemic   4. Newly diagnosised cardiomyopathy   5. LVEF 15%, LVEDD 65, LVMI 144  6. Severe secondary MR (ERO 0.3 cm2)  7. RV dysfunction ( TAPSE 1.4)  8. Moderate TR  9. LBBB,       PLAN:  1. IV diuresis  2. Stop lisinopril - let wash out of symptom and plan for Entresto in 36 hours. 3. For [address] tomorrow   4. Viral panels  5. Iron studies  6. Daily weights, I/O, BMP and QOD BNP  7. Sodium restricted diet  8. HF RN navigator       Thank you for allowing us to participate in the care of this patient. We will follow as medical course develops. Do not hesitate to contact us with further questions. Nadia Diaz M.D.  [facility]  Heart Failure and Pulmonary Hypertension  Mobile [phone]

## 2020-01-15 NOTE — PROGRESS NOTES
INPATIENT CARDIOLOGY FOLLOW-UP    Name: Claudean Butters    Age: 64 y.o. Date of Admission: 1/13/2020  7:02 AM    Date of Service: 1/15/2020    Chief Complaint: Follow-up for dilated and likely nonischemic cardiomyopathy, acute systolic heart failure, mitral valve disease    Interim History: The patient remains symptomatic with persistent orthopnea and insomnia as well as that of persistent dyspnea with extremely limited functional capabilities. Her echocardiogram demonstrates evidence of a dilated left ventricular chamber with abnormal septal motion consistent with that of her conduction system abnormalities with severe left ventricular systolic dysfunction and estimated ejection fraction of 15% with right ventricular dysfunction and severe mitral regurgitation. Review of Systems: The remainder of a complete multisystem review including consitutional, central nervous, respiratory, circulatory, gastrointestinal, genitourinary, endocrinologic, hematologic, musculoskeletal and psychiatric are negative.     Problem List:  Patient Active Problem List   Diagnosis    Admitted for acute congestive heart failure (HCC)    Hyperglycemia    Pleural effusion on right    Elevated blood pressure reading without diagnosis of hypertension    Hypokalemia       Allergies:  No Known Allergies    Current Medications:  Current Facility-Administered Medications   Medication Dose Route Frequency Provider Last Rate Last Dose    furosemide (LASIX) tablet 40 mg  40 mg Oral Daily Cedrick Padron MD   40 mg at 01/14/20 0836    potassium bicarb-citric acid (EFFER-K) effervescent tablet 40 mEq  40 mEq Oral BID Cedrick Padron MD   40 mEq at 01/14/20 2225    perflutren lipid microspheres (DEFINITY) injection 1.65 mg  1.5 mL Intravenous ONCE PRN Dru Massey,         docusate sodium (COLACE) capsule 100 mg  100 mg Oral Nightly Dru Massey DO        albuterol (PROVENTIL) nebulizer solution 2.5 mg  2.5 mg Nebulization 4x daily Dru America Shilpa, DO   2.5 mg at 01/14/20 2146    pantoprazole (PROTONIX) tablet 40 mg  40 mg Oral QAM AC Dru America Shilpa, DO   40 mg at 01/15/20 3735    enoxaparin (LOVENOX) injection 40 mg  40 mg Subcutaneous Daily Dru America Shilpa, DO   40 mg at 01/14/20 3746    acetaminophen (TYLENOL) tablet 650 mg  650 mg Oral Q4H PRN Geetha Chino DO        metoprolol succinate (TOPROL XL) extended release tablet 25 mg  25 mg Oral Daily Cedrick Padron MD   25 mg at 01/14/20 0836         Physical Exam:  /85   Pulse 85   Temp 97.4 °F (36.3 °C) (Temporal)   Resp 18   Ht 5' 4\" (1.626 m)   Wt 121 lb 8 oz (55.1 kg)   SpO2 97%   BMI 20.86 kg/m²   Weight change: -3 lb 8 oz (-1.588 kg)  Wt Readings from Last 3 Encounters:   01/15/20 121 lb 8 oz (55.1 kg)     The patient is awake, alert and in no discomfort or distress. No gross musculoskeletal deformity is present. No significant skin or nail changes are present. Gross examination of head, eyes, nose and throat are negative. Jugular venous pressure is normal and no carotid bruits are present. Normal respiratory effort is noted with no accessory muscle usage present. Lung fields are clear to ascultation. Cardiac examination is notable for a regular rate and rhythm with no palpable thrill. A soft third heart sound and no cardiac murmur are identified. A benign abdominal examination is present with no masses or organomegaly. Intact pulses are present throughout all extremities and no peripheral edema is present. No focal neurologic deficits are present. Intake/Output:    Intake/Output Summary (Last 24 hours) at 1/15/2020 0819  Last data filed at 1/15/2020 0424  Gross per 24 hour   Intake 240 ml   Output 850 ml   Net -610 ml     No intake/output data recorded.     Laboratory Tests:  Lab Results   Component Value Date    CREATININE 0.8 01/15/2020    BUN 16 01/15/2020     01/15/2020    K 3.9 01/15/2020     01/15/2020    CO2 24 01/15/2020     Recent Labs     01/13/20  1456 01/14/20  0531   CKTOTAL 90 100   CKMB 3.9 4.1     No results found for: BNP  Lab Results   Component Value Date    WBC 9.2 01/13/2020    RBC 4.69 01/13/2020    HGB 13.3 01/13/2020    HCT 41.4 01/13/2020    MCV 88.3 01/13/2020    MCH 28.4 01/13/2020    MCHC 32.1 01/13/2020    RDW 15.1 01/13/2020     01/13/2020    MPV 10.7 01/13/2020     No results for input(s): ALKPHOS, ALT, AST, PROT, BILITOT, BILIDIR, LABALBU in the last 72 hours. No results found for: MG  No results found for: PROTIME, INR  No results found for: TSH  No components found for: CHLPL  No results found for: TRIG  No results found for: HDL  No results found for: [facility]    Cardiac Tests:  Telemetry findings reviewed: Sinus rhythm with a left bundle branch block conduction pattern, no new tachy/bradyarrhythmias overnight  Last Echocardiogram: An echocardiogram demonstrated evidence of a moderately dilated left ventricular chamber with paradoxical septal motion consistent with that of a left bundle branch block conduction pattern with severe left ventricular systolic dysfunction and estimated ejection fraction of 15% with stage I diastolic dysfunction and associated right ventricular dysfunction. Severe left atrial enlargement and severe posteriorly directed mitral regurgitation are present with a small, hemodynamically insignificant pericardial effusion      ASSESSMENT / PLAN: On a clinical basis, the patient remains symptomatic in the face of present documentation of her suspected cardiomyopathy with biventricular dysfunction and severe mitral regurgitation. She remains adequately perfused and relatively euvolemic with present therapy with plans of the addition of low-dose afterload reduction and if tolerated and acceptable from the standpoint of renal function electrolytes the subsequent addition of spironolactone.   Based on her symptomatic status and severe left ventricular systolic dysfunction, the involvement of the acute heart failure management team will additionally be of benefit which has been extensively discussed with her. An extensive discussion has been held regarding the seriousness of her condition and in spite of her desires to return to work probably its potential adverse effects on her condition and potential for recovery. Continued careful monitoring of her blood pressure will be necessitated during the optimization of her medical regimen along with that of renal function and electrolytes. The duration of discussion counseling in conjunction with the present encounter exceeded 35 minutes      Note: This report was completed utilizing computer voice recognition software. Every effort has been made to ensure accuracy, however; inadvertent computerized transcription errors may be present. Dustin Castanon.  Tereso Short, [address] [facility]

## 2020-01-16 LAB
ABO/RH: NORMAL
ANION GAP SERPL CALCULATED.3IONS-SCNC: 15 MMOL/L (ref 7–16)
ANTIBODY SCREEN: NORMAL
BUN BLDV-MCNC: 14 MG/DL (ref 6–20)
CALCIUM SERPL-MCNC: 8.5 MG/DL (ref 8.6–10.2)
CHLORIDE BLD-SCNC: 99 MMOL/L (ref 98–107)
CO2: 28 MMOL/L (ref 22–29)
CREAT SERPL-MCNC: 0.9 MG/DL (ref 0.5–1)
GFR AFRICAN AMERICAN: >60
GFR NON-AFRICAN AMERICAN: >60 ML/MIN/1.73
GLUCOSE BLD-MCNC: 98 MG/DL (ref 74–99)
HAV IGM SER IA-ACNC: NORMAL
HEPATITIS B CORE IGM ANTIBODY: NORMAL
HEPATITIS B SURFACE ANTIGEN INTERPRETATION: NORMAL
HEPATITIS C ANTIBODY INTERPRETATION: NORMAL
HIV-1 AND HIV-2 ANTIBODIES: NORMAL
POTASSIUM SERPL-SCNC: 3.5 MMOL/L (ref 3.5–5)
PRO-BNP: 4967 PG/ML (ref 0–125)
SODIUM BLD-SCNC: 142 MMOL/L (ref 132–146)
TSH SERPL DL<=0.05 MIU/L-ACNC: 2.34 UIU/ML (ref 0.27–4.2)

## 2020-01-16 PROCEDURE — 6360000002 HC RX W HCPCS

## 2020-01-16 PROCEDURE — 80048 BASIC METABOLIC PNL TOTAL CA: CPT

## 2020-01-16 PROCEDURE — 6370000000 HC RX 637 (ALT 250 FOR IP): Performed by: INTERNAL MEDICINE

## 2020-01-16 PROCEDURE — 6360000002 HC RX W HCPCS: Performed by: INTERNAL MEDICINE

## 2020-01-16 PROCEDURE — 86658 ENTEROVIRUS ANTIBODY: CPT

## 2020-01-16 PROCEDURE — 4A023N7 MEASUREMENT OF CARDIAC SAMPLING AND PRESSURE, LEFT HEART, PERCUTANEOUS APPROACH: ICD-10-PCS | Performed by: INTERNAL MEDICINE

## 2020-01-16 PROCEDURE — 2140000000 HC CCU INTERMEDIATE R&B

## 2020-01-16 PROCEDURE — 80074 ACUTE HEPATITIS PANEL: CPT

## 2020-01-16 PROCEDURE — B2151ZZ FLUOROSCOPY OF LEFT HEART USING LOW OSMOLAR CONTRAST: ICD-10-PCS | Performed by: INTERNAL MEDICINE

## 2020-01-16 PROCEDURE — 86850 RBC ANTIBODY SCREEN: CPT

## 2020-01-16 PROCEDURE — 94760 N-INVAS EAR/PLS OXIMETRY 1: CPT

## 2020-01-16 PROCEDURE — 99233 SBSQ HOSP IP/OBS HIGH 50: CPT | Performed by: INTERNAL MEDICINE

## 2020-01-16 PROCEDURE — 2500000003 HC RX 250 WO HCPCS

## 2020-01-16 PROCEDURE — 93458 L HRT ARTERY/VENTRICLE ANGIO: CPT

## 2020-01-16 PROCEDURE — 94640 AIRWAY INHALATION TREATMENT: CPT

## 2020-01-16 PROCEDURE — 86665 EPSTEIN-BARR CAPSID VCA: CPT

## 2020-01-16 PROCEDURE — 84443 ASSAY THYROID STIM HORMONE: CPT

## 2020-01-16 PROCEDURE — C1894 INTRO/SHEATH, NON-LASER: HCPCS

## 2020-01-16 PROCEDURE — 86645 CMV ANTIBODY IGM: CPT

## 2020-01-16 PROCEDURE — 86747 PARVOVIRUS ANTIBODY: CPT

## 2020-01-16 PROCEDURE — 86901 BLOOD TYPING SEROLOGIC RH(D): CPT

## 2020-01-16 PROCEDURE — 2580000003 HC RX 258: Performed by: INTERNAL MEDICINE

## 2020-01-16 PROCEDURE — 36415 COLL VENOUS BLD VENIPUNCTURE: CPT

## 2020-01-16 PROCEDURE — 2500000003 HC RX 250 WO HCPCS: Performed by: NURSE PRACTITIONER

## 2020-01-16 PROCEDURE — 86900 BLOOD TYPING SEROLOGIC ABO: CPT

## 2020-01-16 PROCEDURE — 2709999900 HC NON-CHARGEABLE SUPPLY

## 2020-01-16 PROCEDURE — 2580000003 HC RX 258: Performed by: NURSE PRACTITIONER

## 2020-01-16 PROCEDURE — C1769 GUIDE WIRE: HCPCS

## 2020-01-16 PROCEDURE — 83880 ASSAY OF NATRIURETIC PEPTIDE: CPT

## 2020-01-16 PROCEDURE — B211YZZ FLUOROSCOPY OF MULTIPLE CORONARY ARTERIES USING OTHER CONTRAST: ICD-10-PCS | Performed by: INTERNAL MEDICINE

## 2020-01-16 PROCEDURE — 86644 CMV ANTIBODY: CPT

## 2020-01-16 PROCEDURE — 86703 HIV-1/HIV-2 1 RESULT ANTBDY: CPT

## 2020-01-16 RX ORDER — METOPROLOL SUCCINATE 25 MG/1
25 TABLET, EXTENDED RELEASE ORAL 2 TIMES DAILY
Status: DISCONTINUED | OUTPATIENT
Start: 2020-01-16 | End: 2020-01-18

## 2020-01-16 RX ORDER — SPIRONOLACTONE 25 MG/1
25 TABLET ORAL DAILY
Status: DISCONTINUED | OUTPATIENT
Start: 2020-01-17 | End: 2020-01-21 | Stop reason: HOSPADM

## 2020-01-16 RX ORDER — SODIUM CHLORIDE 0.9 % (FLUSH) 0.9 %
10 SYRINGE (ML) INJECTION PRN
Status: DISCONTINUED | OUTPATIENT
Start: 2020-01-16 | End: 2020-01-21 | Stop reason: HOSPADM

## 2020-01-16 RX ORDER — ACETAMINOPHEN 325 MG/1
650 TABLET ORAL EVERY 4 HOURS PRN
Status: DISCONTINUED | OUTPATIENT
Start: 2020-01-16 | End: 2020-01-21 | Stop reason: HOSPADM

## 2020-01-16 RX ORDER — POTASSIUM CHLORIDE 20 MEQ/1
40 TABLET, EXTENDED RELEASE ORAL ONCE
Status: COMPLETED | OUTPATIENT
Start: 2020-01-16 | End: 2020-01-16

## 2020-01-16 RX ORDER — SODIUM CHLORIDE 0.9 % (FLUSH) 0.9 %
10 SYRINGE (ML) INJECTION EVERY 12 HOURS SCHEDULED
Status: DISCONTINUED | OUTPATIENT
Start: 2020-01-16 | End: 2020-01-21 | Stop reason: HOSPADM

## 2020-01-16 RX ORDER — SPIRONOLACTONE 25 MG/1
12.5 TABLET ORAL DAILY
Status: DISCONTINUED | OUTPATIENT
Start: 2020-01-16 | End: 2020-01-16

## 2020-01-16 RX ADMIN — ZOLPIDEM TARTRATE 5 MG: 5 TABLET ORAL at 22:43

## 2020-01-16 RX ADMIN — DOCUSATE SODIUM 100 MG: 50 LIQUID ORAL at 20:27

## 2020-01-16 RX ADMIN — BUMETANIDE 0.5 MG/HR: 0.25 INJECTION, SOLUTION INTRAMUSCULAR; INTRAVENOUS at 17:52

## 2020-01-16 RX ADMIN — POTASSIUM CHLORIDE 40 MEQ: 1500 TABLET, EXTENDED RELEASE ORAL at 11:32

## 2020-01-16 RX ADMIN — PANTOPRAZOLE SODIUM 40 MG: 40 GRANULE, DELAYED RELEASE ORAL at 06:28

## 2020-01-16 RX ADMIN — ALBUTEROL SULFATE 2.5 MG: 2.5 SOLUTION RESPIRATORY (INHALATION) at 19:38

## 2020-01-16 RX ADMIN — ENOXAPARIN SODIUM 40 MG: 40 INJECTION SUBCUTANEOUS at 08:41

## 2020-01-16 RX ADMIN — BUMETANIDE 1 MG: 0.25 INJECTION, SOLUTION INTRAMUSCULAR; INTRAVENOUS at 08:42

## 2020-01-16 RX ADMIN — METOPROLOL SUCCINATE 25 MG: 25 TABLET, FILM COATED, EXTENDED RELEASE ORAL at 20:27

## 2020-01-16 RX ADMIN — Medication 10 ML: at 20:27

## 2020-01-16 RX ADMIN — METOPROLOL SUCCINATE 25 MG: 25 TABLET, EXTENDED RELEASE ORAL at 08:42

## 2020-01-16 ASSESSMENT — PAIN SCALES - GENERAL
PAINLEVEL_OUTOF10: 0
PAINLEVEL_OUTOF10: 0

## 2020-01-16 NOTE — PROCEDURES
[address]                  [address] [facility],  [facility]                            CARDIAC CATHETERIZATION    PATIENT NAME: Lennox Mouton                         :        1960  MED REC NO:   [id number]                            ROOM:       [id number]  ACCOUNT NO:   [de-identified]                           ADMIT DATE: 2020  PROVIDER:     Beth Galeana MD    DATE OF PROCEDURE:  2020    LEFT HEART CATHETERIZATION    INDICATION:  Severe LV dysfunction with severe mitral regurgitation. PROCEDURE NOTE:  The patient was brought to the cardiac cath lab in her  usual fasting state. Under sterile condition and under local anesthetic  and using conscious sedation, I failed to access the small right radial  artery. Then, a 5-[place] micropuncture kit was used and a 5-[place]  sheath was inserted into the right common femoral artery. Then, a  5-[place] JL3.5 diagnostic catheter was advanced to the ascending aorta  and the left main coronary artery was engaged and a coronary angiogram  was done. This catheter was exchanged over a guidewire to a 5-[place]  JR4 diagnostic catheter which was able to engage the right coronary  artery and a coronary angiogram was done. This catheter was exchanged  over a guidewire to a 5-[place] pigtail which was advanced into the left  ventricle without difficulty. The left ventricular end-diastolic  pressure was measured. Left ventriculogram was done. There was no  significant gradient across the aortic valve. At the end of the  procedure, the catheter was pulled out. The right groin sheath was  pulled out and manual pressure was applied over the right groin with  good hemostasis. The patient tolerated the procedure very well and no  complications were noted. No significant bleeding occurred during the  procedure. FINDINGS:  HEMODYNAMICS:  1. Aortic pressure 104/85 mmHg.   2.  Left ventricular end-diastolic pressure 37

## 2020-01-16 NOTE — PROGRESS NOTES
INPATIENT CARDIOLOGY FOLLOW-UP    Name: Kamila Gerber    Age: 64 y.o. Date of Admission: 1/13/2020  7:02 AM    Date of Service: 1/16/2020    Chief Complaint: Follow-up for dilated cardiomyopathy, acute systolic heart failure, mitral valve disease    Interim History: The patient relates symptomatic improvement of her respiratory status and the ability to sleep nocturnally with additional fluid mobilization. Additional assessment and recommendation of the acute heart failure service has been reviewed. She remains hemodynamically stable with no change of renal function or electrolytes. Review of Systems: The remainder of a complete multisystem review including consitutional, central nervous, respiratory, circulatory, gastrointestinal, genitourinary, endocrinologic, hematologic, musculoskeletal and psychiatric are negative.     Problem List:  Patient Active Problem List   Diagnosis    Admitted for acute congestive heart failure ([facility] [address]    Hyperglycemia    Pleural effusion on right    Elevated blood pressure reading without diagnosis of hypertension    Hypokalemia       Allergies:  No Known Allergies    Current Medications:  Current Facility-Administered Medications   Medication Dose Route Frequency Provider Last Rate Last Dose    potassium chloride (KLOR-CON M) extended release tablet 40 mEq  40 mEq Oral Once Charlestine Lesch, DO        pantoprazole sodium (PROTONIX) packet 40 mg  40 mg Oral QAM AC Dru Martell, DO   40 mg at 01/16/20 7628    docusate sodium (COLACE) 150 MG/15ML liquid 100 mg  100 mg Oral Nightly Dru Martell DO        zolpidem (AMBIEN) tablet 5 mg  5 mg Oral Nightly PRN Charlestine Lesch, DO   5 mg at 01/15/20 2249    bumetanide (BUMEX) injection 1 mg  1 mg Intravenous BID RAJESH Rolon CNP   1 mg at 01/16/20 1667    [START ON 1/17/2020] sacubitril-valsartan (ENTRESTO) 24-26 MG per tablet 1 tablet  1 tablet Oral BID RAJESH Rolon CNP        BUN 14 01/16/2020     01/16/2020    K 3.5 01/16/2020    CL 99 01/16/2020    CO2 28 01/16/2020     Recent Labs     01/13/20  1456 01/14/20  0531   CKTOTAL 90 100   CKMB 3.9 4.1     No results found for: BNP  Lab Results   Component Value Date    WBC 9.2 01/13/2020    RBC 4.69 01/13/2020    HGB 13.3 01/13/2020    HCT 41.4 01/13/2020    MCV 88.3 01/13/2020    MCH 28.4 01/13/2020    MCHC 32.1 01/13/2020    RDW 15.1 01/13/2020     01/13/2020    MPV 10.7 01/13/2020     No results for input(s): ALKPHOS, ALT, AST, PROT, BILITOT, BILIDIR, LABALBU in the last 72 hours. No results found for: MG  No results found for: PROTIME, INR  Lab Results   Component Value Date    TSH 2.340 01/16/2020     No components found for: CHLPL  No results found for: TRIG  No results found for: HDL  No results found for: [address]    Cardiac Tests:  Telemetry findings reviewed: sinus rhythm, no new tachy/bradyarrhythmias overnight      ASSESSMENT / PLAN: On a clinical basis, the patient reports subjective improvement with continued optimization of medical management with the assistance of the acute heart failure service appreciated. Catheterization studies including that of coronary angiography are pending to assist in further optimization of medical therapy with her angiotensin-converting enzyme inhibitor withheld in plans of conversion to sacubitril-valsartan following washout. Continued monitoring of her blood pressure as well as that of renal function and electrolytes will be necessary with potential future addition of spironolactone if potassium levels permit. On the basis of severe left ventricular systolic dysfunction and arrhythmia related risks including that of potential associated sudden cardiac death, and external cardioverter defibrillator has been discussed and will be arranged at the time of hospital discharge.     The duration of discussion and counseling in conjunction with the present encounter exceeded 35 minutes      Note: This report was completed utilizing computer voice recognition software. Every effort has been made to ensure accuracy, however; inadvertent computerized transcription errors may be present. Carly Arteaga.  Viviane Forrest, [address] [place] [address]

## 2020-01-17 LAB
ANION GAP SERPL CALCULATED.3IONS-SCNC: 15 MMOL/L (ref 7–16)
ANION GAP SERPL CALCULATED.3IONS-SCNC: 18 MMOL/L (ref 7–16)
BUN BLDV-MCNC: 15 MG/DL (ref 6–20)
BUN BLDV-MCNC: 20 MG/DL (ref 6–20)
CALCIUM SERPL-MCNC: 9.8 MG/DL (ref 8.6–10.2)
CALCIUM SERPL-MCNC: 9.8 MG/DL (ref 8.6–10.2)
CHLORIDE BLD-SCNC: 93 MMOL/L (ref 98–107)
CHLORIDE BLD-SCNC: 96 MMOL/L (ref 98–107)
CO2: 28 MMOL/L (ref 22–29)
CO2: 28 MMOL/L (ref 22–29)
CREAT SERPL-MCNC: 0.8 MG/DL (ref 0.5–1)
CREAT SERPL-MCNC: 0.9 MG/DL (ref 0.5–1)
CYTOMEGALOVIRUS IGG ANTIBODY: NORMAL
CYTOMEGALOVIRUS IGM ANTIBODY: NORMAL
GFR AFRICAN AMERICAN: >60
GFR AFRICAN AMERICAN: >60
GFR NON-AFRICAN AMERICAN: >60 ML/MIN/1.73
GFR NON-AFRICAN AMERICAN: >60 ML/MIN/1.73
GLUCOSE BLD-MCNC: 115 MG/DL (ref 74–99)
GLUCOSE BLD-MCNC: 133 MG/DL (ref 74–99)
POTASSIUM SERPL-SCNC: 3.6 MMOL/L (ref 3.5–5)
POTASSIUM SERPL-SCNC: 3.9 MMOL/L (ref 3.5–5)
SODIUM BLD-SCNC: 139 MMOL/L (ref 132–146)
SODIUM BLD-SCNC: 139 MMOL/L (ref 132–146)

## 2020-01-17 PROCEDURE — 6360000002 HC RX W HCPCS: Performed by: INTERNAL MEDICINE

## 2020-01-17 PROCEDURE — 6370000000 HC RX 637 (ALT 250 FOR IP): Performed by: NURSE PRACTITIONER

## 2020-01-17 PROCEDURE — 80048 BASIC METABOLIC PNL TOTAL CA: CPT

## 2020-01-17 PROCEDURE — 2580000003 HC RX 258: Performed by: INTERNAL MEDICINE

## 2020-01-17 PROCEDURE — APPSS45 APP SPLIT SHARED TIME 31-45 MINUTES: Performed by: NURSE PRACTITIONER

## 2020-01-17 PROCEDURE — 6370000000 HC RX 637 (ALT 250 FOR IP): Performed by: INTERNAL MEDICINE

## 2020-01-17 PROCEDURE — 94760 N-INVAS EAR/PLS OXIMETRY 1: CPT

## 2020-01-17 PROCEDURE — 2500000003 HC RX 250 WO HCPCS: Performed by: INTERNAL MEDICINE

## 2020-01-17 PROCEDURE — 94640 AIRWAY INHALATION TREATMENT: CPT

## 2020-01-17 PROCEDURE — 94761 N-INVAS EAR/PLS OXIMETRY MLT: CPT

## 2020-01-17 PROCEDURE — 99232 SBSQ HOSP IP/OBS MODERATE 35: CPT | Performed by: INTERNAL MEDICINE

## 2020-01-17 PROCEDURE — 2140000000 HC CCU INTERMEDIATE R&B

## 2020-01-17 PROCEDURE — 99233 SBSQ HOSP IP/OBS HIGH 50: CPT | Performed by: INTERNAL MEDICINE

## 2020-01-17 PROCEDURE — 36415 COLL VENOUS BLD VENIPUNCTURE: CPT

## 2020-01-17 RX ORDER — BUMETANIDE 0.25 MG/ML
2 INJECTION, SOLUTION INTRAMUSCULAR; INTRAVENOUS 3 TIMES DAILY
Status: DISCONTINUED | OUTPATIENT
Start: 2020-01-17 | End: 2020-01-18

## 2020-01-17 RX ADMIN — METOPROLOL SUCCINATE 25 MG: 25 TABLET, FILM COATED, EXTENDED RELEASE ORAL at 09:57

## 2020-01-17 RX ADMIN — BUMETANIDE 2 MG: 0.25 INJECTION, SOLUTION INTRAMUSCULAR; INTRAVENOUS at 21:25

## 2020-01-17 RX ADMIN — ALBUTEROL SULFATE 2.5 MG: 2.5 SOLUTION RESPIRATORY (INHALATION) at 16:10

## 2020-01-17 RX ADMIN — Medication 10 ML: at 08:26

## 2020-01-17 RX ADMIN — Medication 10 ML: at 21:27

## 2020-01-17 RX ADMIN — METOPROLOL SUCCINATE 25 MG: 25 TABLET, FILM COATED, EXTENDED RELEASE ORAL at 21:25

## 2020-01-17 RX ADMIN — DOCUSATE SODIUM 100 MG: 50 LIQUID ORAL at 21:24

## 2020-01-17 RX ADMIN — SACUBITRIL AND VALSARTAN 1 TABLET: 24; 26 TABLET, FILM COATED ORAL at 21:32

## 2020-01-17 RX ADMIN — SPIRONOLACTONE 25 MG: 25 TABLET ORAL at 23:27

## 2020-01-17 RX ADMIN — ALBUTEROL SULFATE 2.5 MG: 2.5 SOLUTION RESPIRATORY (INHALATION) at 08:20

## 2020-01-17 RX ADMIN — ALBUTEROL SULFATE 2.5 MG: 2.5 SOLUTION RESPIRATORY (INHALATION) at 19:23

## 2020-01-17 RX ADMIN — PANTOPRAZOLE SODIUM 40 MG: 40 GRANULE, DELAYED RELEASE ORAL at 07:01

## 2020-01-17 RX ADMIN — ZOLPIDEM TARTRATE 5 MG: 5 TABLET ORAL at 23:24

## 2020-01-17 RX ADMIN — ENOXAPARIN SODIUM 40 MG: 40 INJECTION SUBCUTANEOUS at 08:25

## 2020-01-17 RX ADMIN — POTASSIUM BICARBONATE 20 MEQ: 782 TABLET, EFFERVESCENT ORAL at 09:56

## 2020-01-17 RX ADMIN — ALBUTEROL SULFATE 2.5 MG: 2.5 SOLUTION RESPIRATORY (INHALATION) at 12:54

## 2020-01-17 RX ADMIN — SACUBITRIL AND VALSARTAN 1 TABLET: 24; 26 TABLET, FILM COATED ORAL at 08:25

## 2020-01-17 ASSESSMENT — PAIN SCALES - GENERAL
PAINLEVEL_OUTOF10: 0

## 2020-01-17 NOTE — PROGRESS NOTES
Advanced Heart Failure and Pulmonary Hypertension  Inpatient Progress Note          CC/ID: Index hospitalization for acute heart failure       24-hour events/subjective:  - no acute events overnight  - LHC yesterday with no significant stenosis  - LVEDP 37  - initiated on Bumex gtt last evening  - uop yesterday 2.9 L  - net negative -3.2 L  - stable renal function with no change in co2  - 11 lb weight loss since admission       Telemetry:  Sinus Rhythm         Inotropes, Pressors, and Intravenous Therapy:  Bumex gtt       Physical Exam:    BP (!) 92/58 Comment: manual   Pulse 80   Temp 98.3 °F (36.8 °C) (Temporal)   Resp 16   Ht 5' 4\" (1.626 m)   Wt 114 lb 4.8 oz (51.8 kg)   SpO2 99%   BMI 19.62 kg/m²   Wt Readings from Last 3 Encounters:   01/17/20 114 lb 4.8 oz (51.8 kg)     Appearance: Awake, alert, no acute respiratory distress  Skin: Intact, no rash  Head: Normocephalic, atraumatic  Eyes: EOMI, no conjunctival erythema, anicteric sclerae  ENMT: No pharyngeal erythema, MMM, no rhinorrhea  Neck: Soft, supple, +hjr/jvd  Lungs: Clear to auscultation bilaterally. No wheezes, rales, or rhonchi. Cardiac: Regular rate and rhythm, +S1S2, no murmurs apparent  Abdomen: Soft, nontender, +bowel sounds  Extremities: Moves all extremities x 4, no lower extremity edema. Warm and well perfused.    Neurologic: No focal motor deficits apparent, normal mood and affect  Peripheral Pulses: Intact posterior tibial pulses bilaterally        Laboratory Tests:    Lab Results   Component Value Date    CREATININE 0.8 01/17/2020    BUN 20 01/17/2020     01/17/2020    K 3.6 01/17/2020    CL 93 (L) 01/17/2020    CO2 28 01/17/2020     Lab Results   Component Value Date    WBC 9.2 01/13/2020    HGB 13.3 01/13/2020    HCT 41.4 01/13/2020    MCV 88.3 01/13/2020     01/13/2020     Lab Results   Component Value Date    CKTOTAL 100 01/14/2020    CKMB 4.1 01/14/2020    TROPONINI <0.01 01/14/2020     Lab Results   Component Value Date    TSH 2.340 01/16/2020     Lab Results   Component Value Date    LABA1C 5.6 01/13/2020       Current Medications:  Current Facility-Administered Medications   Medication Dose Route Frequency Provider Last Rate Last Dose    metoprolol succinate (TOPROL XL) extended release tablet 25 mg  25 mg Oral BID Ellyn Gottron, MD   25 mg at 01/16/20 2027    spironolactone (ALDACTONE) tablet 25 mg  25 mg Oral Daily Ellyn Gottron, MD        sodium chloride flush 0.9 % injection 10 mL  10 mL Intravenous 2 times per day Ananth Orellana MD   10 mL at 01/17/20 0826    sodium chloride flush 0.9 % injection 10 mL  10 mL Intravenous PRN Ananth Orellana MD        acetaminophen (TYLENOL) tablet 650 mg  650 mg Oral Q4H PRN Ananth Orellana MD        magnesium hydroxide (MILK OF MAGNESIA) 400 MG/5ML suspension 30 mL  30 mL Oral Daily PRN Ananth Orellana MD        bumetanide (BUMEX) 12.5 mg in sodium chloride 0.9 % 125 mL infusion  0.5 mg/hr Intravenous Continuous RAJESH Torres CNP 5 mL/hr at 01/16/20 1752 0.5 mg/hr at 01/16/20 1752    pantoprazole sodium (PROTONIX) packet 40 mg  40 mg Oral QAM AC Dru Funes, DO   40 mg at 01/17/20 0701    docusate sodium (COLACE) 150 MG/15ML liquid 100 mg  100 mg Oral Nightly Dru Funes DO   100 mg at 01/16/20 2027    zolpidem (AMBIEN) tablet 5 mg  5 mg Oral Nightly PRN [address], DO   5 mg at 01/16/20 2243    sacubitril-valsartan (ENTRESTO) 24-26 MG per tablet 1 tablet  1 tablet Oral BID RAJESH Torres CNP   1 tablet at 01/17/20 0825    perflutren lipid microspheres (DEFINITY) injection 1.65 mg  1.5 mL Intravenous ONCE PRN Dru Funes DO        albuterol (PROVENTIL) nebulizer solution 2.5 mg  2.5 mg Nebulization 4x daily Sander Art, DO   2.5 mg at 01/16/20 1938    enoxaparin (LOVENOX) injection 40 mg  40 mg Subcutaneous Daily Dur Funes DO   40 mg at 01/17/20 0825      bumetanide 0.1 mg/mL infusion 0.5 mg/hr (01/16/20 1752)     IMAGING:     CXR (1/13/2020)  Impression  Tortuous ectatic aorta  Cardiomegaly   Airspace disease compatible with atelectasis or pneumonia, at both  lung bases, likely with associated pleural effusion        CTA Chest (1/13/2020)  Impression:  Cardiomegaly with small pericardial effusion, the left ventricle is dilated, the overall appearance of the chest suggest congestive heart failure  Bilateral pleural effusions and infiltrates  Mild airspace disease suggesting pulmonary edema     CARDIAC TESTING:     TTE (1/14/2020, Dr. Susana Lewis)   Summary:   Moderately dilated left ventricle.   Abnormal (paradoxical) motion consistent with left bundle branch block.   Severely reduced left ventricular systolic function.   There is doppler evidence of stage I diastolic dysfunction.   The left atrium is severely dilated.   Right ventricle global systolic function is reduced.   Severe posterior mitral regurgitation is present.   Mild tricuspid regurgitation.  Liddie Roderick is a small circumferential pericardial effusion noted. [facility] (1/16/2020)  HEMODYNAMICS:  1. Aortic pressure 104/85 mmHg. 2.  Left ventricular end-diastolic pressure 37 mmHg. CORONARY ANATOMY:  1. Left main:  It is medium in size with no angiographic stenosis noted. 2.  LAD:  It is large in caliber and wraps around the apex and gives rise to one diagonal branch. No angiographic stenosis noted. 3.  Ramus: It is a very small branch. 4.  Left circumflex: It is fair in size and giving rise to a large bifurcating obtuse marginal branch, then continues to AV groove in three  or four small branches. No angiographic stenosis noted. 5.  RCA:  It is large and dominant giving rise to a conus branch, an SA jamar or AV jamar branch, an RV marginal branch, then a PDA and PLV branch. No angiographic stenosis noted. Left ventriculogram revealed significantly dilated left ventricle with an ejection fraction of 10% to 15%.   There was severe mitral  regurgitation noted. IMPRESSION:  1. Absence of angiographic coronary stenosis. 2.  Significantly elevated left ventricular end-diastolic pressure at 37 mmHg. 3.  Dilated left ventricle with an ejection fraction of 10% to 15%. 4.  Severe mitral regurgitation. EKG  Sinus rhythm  Right axis deviation  LBBB         ASSESSMENT:  1. Acute HFrEF - index hospitalization  2. ACC stage C / NYHA class II - III  3. Hypervolemic - LVEDP 37 yesterday    4. Nonischemic cardiomyopathy   5. LVEF 15%, LVEDD 65, LVMI 144  6. Severe secondary MR (ERO 0.3 cm2)  7. RV dysfunction ( TAPSE 1.4)  8. Moderate TR  9. LBBB,         PLAN:  1. Continue bumex gtt, decrease dose to 0.2 mg/hr - assess uop this afternoon and consider transitioning to IVP bumex tonight versus tomorrow. Then eventual PO bumex potentially Sunday. 2. Scheduled for first dose of Entresto today, will monitor  3. Continue Toprol and spironolactone   4. Stagger medication as needed for periods of hypotension  5. Viral panels pending   6. Daily weights, I/O, BMP and QOD BNP  7. Sodium restricted diet  8. HF RN navigator       Amanda Dallas M.D.  [facility]  Heart Failure and Pulmonary Hypertension  [place] [phone]

## 2020-01-17 NOTE — PROGRESS NOTES
Hospitalist Progress Note      PCP: Raquel Rodriguez MD    Date of Admission: 1/13/2020    Chief Complaint: [facility] Course: **pt had been relatively healthy, noticed she was starting to become SOB, coughing, non productive, nauseated, with chills, and had CP located ACW pressure in character, non radiating. She also noticed that she was gaining weight in her stomach.  found to be in CHF** echo shows severe cardiomyopathy.  *  CHF specialist consulted for LHC , normal coronaries, , had  PAF last pm  ? Life vest ** starting entresto    Subjective: * feeling better      Medications:  Reviewed    Infusion Medications    bumetanide 0.1 mg/mL infusion 0.2 mg/hr (01/17/20 1029)     Scheduled Medications    bumetanide  2 mg Intravenous TID    metoprolol succinate  25 mg Oral BID    spironolactone  25 mg Oral Daily    sodium chloride flush  10 mL Intravenous 2 times per day    pantoprazole sodium  40 mg Oral QAM AC    docusate sodium  100 mg Oral Nightly    sacubitril-valsartan  1 tablet Oral BID    albuterol  2.5 mg Nebulization 4x daily    enoxaparin  40 mg Subcutaneous Daily     PRN Meds: sodium chloride flush, acetaminophen, magnesium hydroxide, zolpidem, perflutren lipid microspheres      Intake/Output Summary (Last 24 hours) at 1/17/2020 1602  Last data filed at 1/17/2020 1325  Gross per 24 hour   Intake 1140 ml   Output 1900 ml   Net -760 ml       Exam:    BP 91/70   Pulse 85   Temp 99.2 °F (37.3 °C) (Temporal)   Resp 18   Ht 5' 4\" (1.626 m)   Wt 114 lb 4.8 oz (51.8 kg)   SpO2 95%   BMI 19.62 kg/m²         Gen:  Well developed   HEENT: NC/AT, moist mucous membranes, no oropharyngeal erythema or exudate  Neck: supple, trachea midline, no anterior cervical or SC LAD  Heart:  Normal s1/s2, RRR, no murmurs, gallops, or rubs.    Lungs:   cta * bilaterally, *  Abd: bowel sounds present, soft, nontender, nondistended, no masses  Extrem:  No clubbing, cyanosis,  no* edema  Skin: no rashes or

## 2020-01-17 NOTE — PROGRESS NOTES
Message sent to Dr. Albert Gilmore to let her know the pt output around 4pm as she requested.  800ml

## 2020-01-17 NOTE — PROGRESS NOTES
[facility]   Department of Pharmacy   Pharmacist Transition of Care Services         Patient Demographics  Name:  Aaron Gillis   Medical Record Number:  [id number]  Gender:  female   Age:  64 y.o. YOB: 1960    Primary Care Physician: Raquel Rodriguez MD  Primary Care Physician phone number:  [phone]  Readmission Risk (% from EPIC Patient List): 9 %     Patient plans to participate in [facility] Meds to [facility] (Y/N): no    Pharmacist Review and Summary of Medications     Date of last reviewed/update: 1/17/20     Category Comments   New Medication Started   1. Entresto 24-26mg PO BID  2. toprol XL 25mg PO BID  3. bumex drip  4. Docusate 100mg PO nightly  5. protonix 40mg PO daily    6. Albuterol neb 2.5mg QID  7. ambien 5mg PO nightly   8. Aldactone 25mg PO daily      Change in Outpatient Medication  (Dosage Form, Route,   Dose, or Frequency) 1. Discontinued Outpatient Medication   (or on Hold During Admission) 1. Other              Pharmacist Patient Education:    Date  Person Educated Content of Education                 Documentation of Pharmacist Interventions and Follow-up Plan:     The following Pharmacist Transition of Care Services were completed:   [x]  Reviewed and summarized medication changes  []  Entire home medication list was reviewed for accuracy (sources: **)  []  Home medication list was updated or corrected     []  Reviewed discharge medication reconciliation  []  Discharge medication list was updated or corrected  []  Patient education was provided on new medications  []  Patient education was provided on medication changes  []  Reviewed the After Visit Summary (AVS) with patient    Additional Interventions:  []  Inpatient prescriber was contacted and the following pharmacy recommendations        were accepted: **     [] Other interventions: **        Pharmacist: Jean Pierre Parker PharmD, [facility]  Date:  1/17/2020 2:55 PM  Time spent counseling on medications: 40 minutes

## 2020-01-18 LAB
ANION GAP SERPL CALCULATED.3IONS-SCNC: 22 MMOL/L (ref 7–16)
BUN BLDV-MCNC: 29 MG/DL (ref 6–20)
CALCIUM SERPL-MCNC: 9.1 MG/DL (ref 8.6–10.2)
CHLORIDE BLD-SCNC: 92 MMOL/L (ref 98–107)
CO2: 24 MMOL/L (ref 22–29)
CREAT SERPL-MCNC: 0.9 MG/DL (ref 0.5–1)
GFR AFRICAN AMERICAN: >60
GFR NON-AFRICAN AMERICAN: >60 ML/MIN/1.73
GLUCOSE BLD-MCNC: 140 MG/DL (ref 74–99)
METER GLUCOSE: 153 MG/DL (ref 74–99)
POTASSIUM SERPL-SCNC: 3.3 MMOL/L (ref 3.5–5)
PRO-BNP: 1418 PG/ML (ref 0–125)
SODIUM BLD-SCNC: 138 MMOL/L (ref 132–146)

## 2020-01-18 PROCEDURE — 36415 COLL VENOUS BLD VENIPUNCTURE: CPT

## 2020-01-18 PROCEDURE — 2140000000 HC CCU INTERMEDIATE R&B

## 2020-01-18 PROCEDURE — 99233 SBSQ HOSP IP/OBS HIGH 50: CPT | Performed by: INTERNAL MEDICINE

## 2020-01-18 PROCEDURE — 94640 AIRWAY INHALATION TREATMENT: CPT

## 2020-01-18 PROCEDURE — 6370000000 HC RX 637 (ALT 250 FOR IP): Performed by: INTERNAL MEDICINE

## 2020-01-18 PROCEDURE — 2500000003 HC RX 250 WO HCPCS: Performed by: INTERNAL MEDICINE

## 2020-01-18 PROCEDURE — 6360000002 HC RX W HCPCS: Performed by: INTERNAL MEDICINE

## 2020-01-18 PROCEDURE — 82962 GLUCOSE BLOOD TEST: CPT

## 2020-01-18 PROCEDURE — 80048 BASIC METABOLIC PNL TOTAL CA: CPT

## 2020-01-18 PROCEDURE — 2580000003 HC RX 258: Performed by: INTERNAL MEDICINE

## 2020-01-18 PROCEDURE — 83880 ASSAY OF NATRIURETIC PEPTIDE: CPT

## 2020-01-18 RX ORDER — POTASSIUM CHLORIDE 20 MEQ/1
40 TABLET, EXTENDED RELEASE ORAL 2 TIMES DAILY WITH MEALS
Status: COMPLETED | OUTPATIENT
Start: 2020-01-18 | End: 2020-01-19

## 2020-01-18 RX ORDER — METOPROLOL SUCCINATE 25 MG/1
37.5 TABLET, EXTENDED RELEASE ORAL 2 TIMES DAILY
Status: DISCONTINUED | OUTPATIENT
Start: 2020-01-18 | End: 2020-01-21

## 2020-01-18 RX ORDER — BUMETANIDE 0.25 MG/ML
2 INJECTION, SOLUTION INTRAMUSCULAR; INTRAVENOUS 2 TIMES DAILY
Status: DISCONTINUED | OUTPATIENT
Start: 2020-01-18 | End: 2020-01-20

## 2020-01-18 RX ADMIN — POTASSIUM CHLORIDE 40 MEQ: 1500 TABLET, EXTENDED RELEASE ORAL at 09:07

## 2020-01-18 RX ADMIN — ENOXAPARIN SODIUM 40 MG: 40 INJECTION SUBCUTANEOUS at 08:49

## 2020-01-18 RX ADMIN — Medication 10 ML: at 21:05

## 2020-01-18 RX ADMIN — BUMETANIDE 2 MG: 0.25 INJECTION, SOLUTION INTRAMUSCULAR; INTRAVENOUS at 08:50

## 2020-01-18 RX ADMIN — DOCUSATE SODIUM 100 MG: 50 LIQUID ORAL at 21:05

## 2020-01-18 RX ADMIN — METOPROLOL SUCCINATE 25 MG: 25 TABLET, FILM COATED, EXTENDED RELEASE ORAL at 08:49

## 2020-01-18 RX ADMIN — ZOLPIDEM TARTRATE 5 MG: 5 TABLET ORAL at 21:15

## 2020-01-18 RX ADMIN — ALBUTEROL SULFATE 2.5 MG: 2.5 SOLUTION RESPIRATORY (INHALATION) at 14:12

## 2020-01-18 RX ADMIN — ACETAMINOPHEN 650 MG: 325 TABLET, FILM COATED ORAL at 07:07

## 2020-01-18 RX ADMIN — PANTOPRAZOLE SODIUM 40 MG: 40 GRANULE, DELAYED RELEASE ORAL at 07:07

## 2020-01-18 RX ADMIN — SACUBITRIL AND VALSARTAN 1 TABLET: 24; 26 TABLET, FILM COATED ORAL at 08:49

## 2020-01-18 RX ADMIN — ACETAMINOPHEN 650 MG: 325 TABLET, FILM COATED ORAL at 19:06

## 2020-01-18 RX ADMIN — ALBUTEROL SULFATE 2.5 MG: 2.5 SOLUTION RESPIRATORY (INHALATION) at 09:19

## 2020-01-18 RX ADMIN — ALBUTEROL SULFATE 2.5 MG: 2.5 SOLUTION RESPIRATORY (INHALATION) at 18:08

## 2020-01-18 RX ADMIN — Medication 10 ML: at 08:49

## 2020-01-18 RX ADMIN — POTASSIUM CHLORIDE 40 MEQ: 1500 TABLET, EXTENDED RELEASE ORAL at 17:00

## 2020-01-18 RX ADMIN — BUMETANIDE 2 MG: 0.25 INJECTION, SOLUTION INTRAMUSCULAR; INTRAVENOUS at 21:05

## 2020-01-18 RX ADMIN — SPIRONOLACTONE 25 MG: 25 TABLET ORAL at 08:49

## 2020-01-18 ASSESSMENT — PAIN SCALES - GENERAL
PAINLEVEL_OUTOF10: 8
PAINLEVEL_OUTOF10: 0
PAINLEVEL_OUTOF10: 6
PAINLEVEL_OUTOF10: 0
PAINLEVEL_OUTOF10: 0

## 2020-01-18 NOTE — PROGRESS NOTES
INPATIENT CARDIOLOGY FOLLOW-UP    Name: Uvaldo Fraga    Age: 64 y.o. Date of Admission: 1/13/2020  7:02 AM    Date of Service: 1/18/2020    Chief Complaint: Follow-up for nonischemic cardiomyopathy, acute systolic heart failure, left bundle branch block    Interim History: The patient presently remains stable from a cardiovascular standpoint with no additional reported symptoms. No additional diuresis has occurred following the conversion to intermittent intravenous diuretics with no clinical evidence of volume overload presently noted. Persistent hypokalemia is noted in spite of her existing medical regimen. Review of Systems: The remainder of a complete multisystem review including consitutional, central nervous, respiratory, circulatory, gastrointestinal, genitourinary, endocrinologic, hematologic, musculoskeletal and psychiatric are negative.     Problem List:  Patient Active Problem List   Diagnosis    Admitted for acute congestive heart failure (HCC)    Hyperglycemia    Pleural effusion on right    Elevated blood pressure reading without diagnosis of hypertension    Hypokalemia    New onset of congestive heart failure (HCC)       Allergies:  No Known Allergies    Current Medications:  Current Facility-Administered Medications   Medication Dose Route Frequency Provider Last Rate Last Dose    bumetanide (BUMEX) injection 2 mg  2 mg Intravenous TID Preston New MD   2 mg at 01/17/20 2125    metoprolol succinate (TOPROL XL) extended release tablet 25 mg  25 mg Oral BID Preston New MD   25 mg at 01/17/20 2125    spironolactone (ALDACTONE) tablet 25 mg  25 mg Oral Daily Preston New MD   25 mg at 01/17/20 2327    sodium chloride flush 0.9 % injection 10 mL  10 mL Intravenous 2 times per day Sridevi Chino MD   10 mL at 01/17/20 2127    sodium chloride flush 0.9 % injection 10 mL  10 mL Intravenous PRN Sridevi Chino MD        acetaminophen (TYLENOL) tablet 650 mg  650 mg present throughout all extremities and no peripheral edema is present. No focal neurologic deficits are present. Intake/Output:    Intake/Output Summary (Last 24 hours) at 1/18/2020 0849  Last data filed at 1/18/2020 0747  Gross per 24 hour   Intake 840 ml   Output 1700 ml   Net -860 ml     I/O this shift:  In: -   Out: 600 [Urine:600]    Laboratory Tests:  Lab Results   Component Value Date    CREATININE 0.9 01/18/2020    BUN 29 (H) 01/18/2020     01/18/2020    K 3.3 (L) 01/18/2020    CL 92 (L) 01/18/2020    CO2 24 01/18/2020     No results for input(s): CKTOTAL, CKMB in the last 72 hours. Invalid input(s): TROPONONI  No results found for: BNP  Lab Results   Component Value Date    WBC 9.2 01/13/2020    RBC 4.69 01/13/2020    HGB 13.3 01/13/2020    HCT 41.4 01/13/2020    MCV 88.3 01/13/2020    MCH 28.4 01/13/2020    MCHC 32.1 01/13/2020    RDW 15.1 01/13/2020     01/13/2020    MPV 10.7 01/13/2020     No results for input(s): ALKPHOS, ALT, AST, PROT, BILITOT, BILIDIR, LABALBU in the last 72 hours. No results found for: MG  No results found for: PROTIME, INR  Lab Results   Component Value Date    TSH 2.340 01/16/2020     No components found for: CHLPL  No results found for: TRIG  No results found for: HDL  No results found for: [address]    Cardiac Tests:  Telemetry findings reviewed: sinus rhythm, no new tachy/bradyarrhythmias overnight      ASSESSMENT / PLAN: On a clinical basis, the patient remains stable from a cardiovascular standpoint and tolerant of her existing medical regimen with recorded intake and input not indicating further diuresis with conversion to intermittent intravenous diuretics. Presently we will maintain her existing therapy and observe over the next 24 hours to assess response and timing of appropriate conversion to that of enteral diuretic therapy.   Based on existing blood pressures and heart rates, further modification of her beta-blocker dosage appears appropriate along with needs of present supplementation of her potassium in view of her hypokalemia. Continued inpatient monitoring remains essential to reducing her risk of repeat hospitalization. Note: This report was completed utilizing computer voice recognition software. Every effort has been made to ensure accuracy, however; inadvertent computerized transcription errors may be present. Jorge Luis Mendoza.  Fawn Duverney, [facility]

## 2020-01-18 NOTE — PROGRESS NOTES
clubbing, cyanosis,  no* edema  Skin: no rashes or lesions  Psych: A & O x3  Neuro: grossly intact, moves all four extremities.    Capillary Refill: Brisk,< 3 seconds   Peripheral Pulses: +2 palpable, equal bilaterally                 Labs:   No results for input(s): WBC, HGB, HCT, PLT in the last 72 hours. Recent Labs     01/16/20  2355 01/17/20  0520 01/18/20  0546    139 138   K 3.9 3.6 3.3*   CL 96* 93* 92*   CO2 28 28 24   BUN 15 20 29*   CREATININE 0.9 0.8 0.9   CALCIUM 9.8 9.8 9.1     No results for input(s): AST, ALT, BILIDIR, BILITOT, ALKPHOS in the last 72 hours. No results for input(s): INR in the last 72 hours. No results for input(s): Eugune Ran in the last 72 hours. No results for input(s): AST, ALT, ALB, BILIDIR, BILITOT, ALKPHOS in the last 72 hours. No results for input(s): LACTA in the last 72 hours.   No results found for: Nadira Essex  No results found for: AMMONIA    Assessment:    Active Hospital Problems    Diagnosis Date Noted    New onset of congestive heart failure ([facility] [I50.9]     Hypokalemia [E87.6]     Admitted for acute congestive heart failure ([facility] [I50.9] 01/13/2020    Hyperglycemia [R73.9] 01/13/2020    Pleural effusion on right [J90] 01/13/2020    Elevated blood pressure reading without diagnosis of hypertension [R03.0] 01/13/2020   PAF  Acute HFrEF  Cardiomyopathy(has life vest)  Severe MR   RV dysfunction  hypokalemia      Plan:  Replace K  Cardiomyopathy workup    aic 5.6  Cont lasix   Monitor bmp        DVT Prophylaxis:lovenox  Diet: DIET CARDIAC; Low Sodium (2 GM)  Code Status: Full Code     PT/OT Eval Status:  na     Dispo - *home         Electronically signed by Mayra Butler DO on 1/18/2020 at 2:04 PM [facility]

## 2020-01-19 LAB
ANION GAP SERPL CALCULATED.3IONS-SCNC: 19 MMOL/L (ref 7–16)
BUN BLDV-MCNC: 30 MG/DL (ref 6–20)
CALCIUM SERPL-MCNC: 9 MG/DL (ref 8.6–10.2)
CHLORIDE BLD-SCNC: 96 MMOL/L (ref 98–107)
CO2: 22 MMOL/L (ref 22–29)
CREAT SERPL-MCNC: 0.9 MG/DL (ref 0.5–1)
GFR AFRICAN AMERICAN: >60
GFR NON-AFRICAN AMERICAN: >60 ML/MIN/1.73
GLUCOSE BLD-MCNC: 133 MG/DL (ref 74–99)
POTASSIUM SERPL-SCNC: 4.1 MMOL/L (ref 3.5–5)
SODIUM BLD-SCNC: 137 MMOL/L (ref 132–146)

## 2020-01-19 PROCEDURE — 80048 BASIC METABOLIC PNL TOTAL CA: CPT

## 2020-01-19 PROCEDURE — 6370000000 HC RX 637 (ALT 250 FOR IP): Performed by: INTERNAL MEDICINE

## 2020-01-19 PROCEDURE — 6360000002 HC RX W HCPCS: Performed by: INTERNAL MEDICINE

## 2020-01-19 PROCEDURE — 2140000000 HC CCU INTERMEDIATE R&B

## 2020-01-19 PROCEDURE — 2580000003 HC RX 258: Performed by: INTERNAL MEDICINE

## 2020-01-19 PROCEDURE — 36415 COLL VENOUS BLD VENIPUNCTURE: CPT

## 2020-01-19 PROCEDURE — 2500000003 HC RX 250 WO HCPCS: Performed by: INTERNAL MEDICINE

## 2020-01-19 PROCEDURE — 99232 SBSQ HOSP IP/OBS MODERATE 35: CPT | Performed by: INTERNAL MEDICINE

## 2020-01-19 PROCEDURE — 94640 AIRWAY INHALATION TREATMENT: CPT

## 2020-01-19 RX ADMIN — METOPROLOL SUCCINATE 37.5 MG: 25 TABLET, FILM COATED, EXTENDED RELEASE ORAL at 08:54

## 2020-01-19 RX ADMIN — ACETAMINOPHEN 650 MG: 325 TABLET, FILM COATED ORAL at 10:59

## 2020-01-19 RX ADMIN — Medication 10 ML: at 08:55

## 2020-01-19 RX ADMIN — PANTOPRAZOLE SODIUM 40 MG: 40 GRANULE, DELAYED RELEASE ORAL at 06:17

## 2020-01-19 RX ADMIN — ZOLPIDEM TARTRATE 5 MG: 5 TABLET ORAL at 22:06

## 2020-01-19 RX ADMIN — DOCUSATE SODIUM 100 MG: 50 LIQUID ORAL at 20:32

## 2020-01-19 RX ADMIN — ENOXAPARIN SODIUM 40 MG: 40 INJECTION SUBCUTANEOUS at 08:55

## 2020-01-19 RX ADMIN — ACETAMINOPHEN 650 MG: 325 TABLET, FILM COATED ORAL at 20:41

## 2020-01-19 RX ADMIN — ALBUTEROL SULFATE 2.5 MG: 2.5 SOLUTION RESPIRATORY (INHALATION) at 18:46

## 2020-01-19 RX ADMIN — SACUBITRIL AND VALSARTAN 1 TABLET: 24; 26 TABLET, FILM COATED ORAL at 10:17

## 2020-01-19 RX ADMIN — BUMETANIDE 2 MG: 0.25 INJECTION, SOLUTION INTRAMUSCULAR; INTRAVENOUS at 20:32

## 2020-01-19 RX ADMIN — Medication 10 ML: at 20:33

## 2020-01-19 RX ADMIN — SPIRONOLACTONE 25 MG: 25 TABLET ORAL at 08:54

## 2020-01-19 RX ADMIN — ALBUTEROL SULFATE 2.5 MG: 2.5 SOLUTION RESPIRATORY (INHALATION) at 15:23

## 2020-01-19 RX ADMIN — POTASSIUM CHLORIDE 40 MEQ: 1500 TABLET, EXTENDED RELEASE ORAL at 08:53

## 2020-01-19 RX ADMIN — BUMETANIDE 2 MG: 0.25 INJECTION, SOLUTION INTRAMUSCULAR; INTRAVENOUS at 08:54

## 2020-01-19 ASSESSMENT — PAIN DESCRIPTION - PROGRESSION: CLINICAL_PROGRESSION: NOT CHANGED

## 2020-01-19 ASSESSMENT — PAIN - FUNCTIONAL ASSESSMENT: PAIN_FUNCTIONAL_ASSESSMENT: ACTIVITIES ARE NOT PREVENTED

## 2020-01-19 ASSESSMENT — PAIN DESCRIPTION - LOCATION
LOCATION: HEAD
LOCATION: HEAD

## 2020-01-19 ASSESSMENT — PAIN DESCRIPTION - FREQUENCY
FREQUENCY: INTERMITTENT
FREQUENCY: INTERMITTENT

## 2020-01-19 ASSESSMENT — PAIN DESCRIPTION - ORIENTATION
ORIENTATION: MID
ORIENTATION: MID

## 2020-01-19 ASSESSMENT — PAIN SCALES - GENERAL
PAINLEVEL_OUTOF10: 6
PAINLEVEL_OUTOF10: 0
PAINLEVEL_OUTOF10: 0
PAINLEVEL_OUTOF10: 2
PAINLEVEL_OUTOF10: 4
PAINLEVEL_OUTOF10: 6

## 2020-01-19 ASSESSMENT — PAIN DESCRIPTION - DESCRIPTORS
DESCRIPTORS: ACHING;DISCOMFORT
DESCRIPTORS: ACHING

## 2020-01-19 ASSESSMENT — PAIN DESCRIPTION - ONSET: ONSET: AWAKENED FROM SLEEP

## 2020-01-19 ASSESSMENT — PAIN DESCRIPTION - PAIN TYPE
TYPE: ACUTE PAIN
TYPE: ACUTE PAIN

## 2020-01-19 NOTE — PROGRESS NOTES
Hospitalist Progress Note      PCP: Neelima Mcgill MD    Date of Admission: 1/13/2020    Chief Complaint: [facility] Course: *pt had been relatively healthy, noticed she was starting to become SOB, coughing, non productive, nauseated, with chills, and had CP located ACW pressure in character, non radiating. She also noticed that she was gaining weight in her stomach.  found to be in CHF** echo shows severe cardiomyopathy.  *  CHF specialist consulted for LHC , normal coronaries, , had  PAF last pm   Life vest today ** starting entresto, RRT called for syncopal episode. BP systolic was 41-22. Dr. Katelyn Silva notified.    **  Diuretic reduced, will change ot po in 24 hours**     Subjective: ** no complaints      Medications:  Reviewed    Infusion Medications   Scheduled Medications    metoprolol succinate  37.5 mg Oral BID    bumetanide  2 mg Intravenous BID    spironolactone  25 mg Oral Daily    sodium chloride flush  10 mL Intravenous 2 times per day    pantoprazole sodium  40 mg Oral QAM AC    docusate sodium  100 mg Oral Nightly    sacubitril-valsartan  1 tablet Oral BID    albuterol  2.5 mg Nebulization 4x daily    enoxaparin  40 mg Subcutaneous Daily     PRN Meds: sodium chloride flush, acetaminophen, magnesium hydroxide, zolpidem, perflutren lipid microspheres      Intake/Output Summary (Last 24 hours) at 1/19/2020 1525  Last data filed at 1/19/2020 1201  Gross per 24 hour   Intake 1080 ml   Output 1300 ml   Net -220 ml       Exam:    /62 Comment: manual  Pulse 76   Temp 97.8 °F (36.6 °C) (Temporal)   Resp 16   Ht 5' 4\" (1.626 m)   Wt 114 lb 11.2 oz (52 kg)   SpO2 97%   BMI 19.69 kg/m²     Gen:  Well developed   HEENT: NC/AT, moist mucous membranes,   Neck: supple, trachea midline,   Heart:  Normal s1/s2, RRR, no murmurs, gallops, or rubs.    Lungs:   cta * bilaterally, *  Abd: bowel sounds present, soft, nontender, nondistended, no masses  Extrem:  No clubbing, cyanosis,  no*

## 2020-01-19 NOTE — PROGRESS NOTES
INPATIENT CARDIOLOGY FOLLOW-UP    Name: Senait Valentin    Age: 64 y.o. Date of Admission: 1/13/2020  7:02 AM    Date of Service: 1/19/2020    Chief Complaint: Follow-up for nonischemic cardiomyopathy, acute systolic heart failure, left bundle branch block    Interim History: The patient presently is stable from a cardiovascular standpoint with interim events including that of transient hypotension with associated symptomatology noted. She is appropriate responded to a reduction of her diuretic dosage without further events. Review of Systems: The remainder of a complete multisystem review including consitutional, central nervous, respiratory, circulatory, gastrointestinal, genitourinary, endocrinologic, hematologic, musculoskeletal and psychiatric are negative.     Problem List:  Patient Active Problem List   Diagnosis    Admitted for acute congestive heart failure (HCC)    Hyperglycemia    Pleural effusion on right    Elevated blood pressure reading without diagnosis of hypertension    Hypokalemia    New onset of congestive heart failure (HCC)       Allergies:  No Known Allergies    Current Medications:  Current Facility-Administered Medications   Medication Dose Route Frequency Provider Last Rate Last Dose    metoprolol succinate (TOPROL XL) extended release tablet 37.5 mg  37.5 mg Oral BID Dhiraj Tello MD   37.5 mg at 01/19/20 0854    bumetanide (BUMEX) injection 2 mg  2 mg Intravenous BID Dhiraj Tello MD   2 mg at 01/19/20 0854    spironolactone (ALDACTONE) tablet 25 mg  25 mg Oral Daily Julia Owen MD   25 mg at 01/19/20 0854    sodium chloride flush 0.9 % injection 10 mL  10 mL Intravenous 2 times per day Rekha Partida MD   10 mL at 01/19/20 0855    sodium chloride flush 0.9 % injection 10 mL  10 mL Intravenous PRN Rekha Partida MD        acetaminophen (TYLENOL) tablet 650 mg  650 mg Oral Q4H PRN Rekha Partida MD   650 mg at 01/18/20 1906    magnesium hydroxide (MILK OF MAGNESIA) 400 MG/5ML suspension 30 mL  30 mL Oral Daily PRN Benjamín Payne MD        pantoprazole sodium (PROTONIX) packet 40 mg  40 mg Oral QAM AC Dru Viki Creed, DO   40 mg at 01/19/20 0617    docusate sodium (COLACE) 150 MG/15ML liquid 100 mg  100 mg Oral Nightly Dru Viki Creed, DO   100 mg at 01/18/20 2105    zolpidem (AMBIEN) tablet 5 mg  5 mg Oral Nightly PRN Loretha Calebty, DO   5 mg at 01/18/20 2115    sacubitril-valsartan (ENTRESTO) 24-26 MG per tablet 1 tablet  1 tablet Oral BID Rosana Vance MD   1 tablet at 01/18/20 0849    perflutren lipid microspheres (DEFINITY) injection 1.65 mg  1.5 mL Intravenous ONCE PRN Dru Viki Creed, DO        albuterol (PROVENTIL) nebulizer solution 2.5 mg  2.5 mg Nebulization 4x daily Dru Casillaselia Creed, DO   2.5 mg at 01/18/20 1808    enoxaparin (LOVENOX) injection 40 mg  40 mg Subcutaneous Daily Dru Viki Creed, DO   40 mg at 01/19/20 0855         Physical Exam:  /62 Comment: manual  Pulse 76   Temp 97.8 °F (36.6 °C) (Temporal)   Resp 16   Ht 5' 4\" (1.626 m)   Wt 114 lb 11.2 oz (52 kg)   SpO2 97%   BMI 19.69 kg/m²   Weight change: 1 lb (0.454 kg)  Wt Readings from Last 3 Encounters:   01/19/20 114 lb 11.2 oz (52 kg)     The patient is awake, alert and in no discomfort or distress. No gross musculoskeletal deformity is present. No significant skin or nail changes are present. Gross examination of head, eyes, nose and throat are negative. Jugular venous pressure is normal and no carotid bruits are present. Normal respiratory effort is noted with no accessory muscle usage present. Lung fields are clear to ascultation. Cardiac examination is notable for a regular rate and rhythm with no palpable thrill. No gallop rhythm or cardiac murmur are identified. A benign abdominal examination is present with no masses or organomegaly. Intact pulses are present throughout all extremities and no peripheral edema is present.  No focal neurologic deficits are present. Intake/Output:    Intake/Output Summary (Last 24 hours) at 1/19/2020 0955  Last data filed at 1/19/2020 0737  Gross per 24 hour   Intake 720 ml   Output 1150 ml   Net -430 ml     I/O this shift:  In: -   Out: 400 [Urine:400]    Laboratory Tests:  Lab Results   Component Value Date    CREATININE 0.9 01/19/2020    BUN 30 (H) 01/19/2020     01/19/2020    K 4.1 01/19/2020    CL 96 (L) 01/19/2020    CO2 22 01/19/2020     No results for input(s): CKTOTAL, CKMB in the last 72 hours. Invalid input(s): TROPONONI  No results found for: BNP  Lab Results   Component Value Date    WBC 9.2 01/13/2020    RBC 4.69 01/13/2020    HGB 13.3 01/13/2020    HCT 41.4 01/13/2020    MCV 88.3 01/13/2020    MCH 28.4 01/13/2020    MCHC 32.1 01/13/2020    RDW 15.1 01/13/2020     01/13/2020    MPV 10.7 01/13/2020     No results for input(s): ALKPHOS, ALT, AST, PROT, BILITOT, BILIDIR, LABALBU in the last 72 hours. No results found for: MG  No results found for: PROTIME, INR  Lab Results   Component Value Date    TSH 2.340 01/16/2020     No components found for: CHLPL  No results found for: TRIG  No results found for: HDL  No results found for: [facility]    Cardiac Tests:  Telemetry findings reviewed: sinus rhythm with occasional ventricular ectopy, no new tachy/bradyarrhythmias overnight      ASSESSMENT / PLAN: On a clinical basis, the patient presently remains stable from a cardiovascular standpoint with no additional symptomatic hypotensive episodes noted. On this basis in view of a slight increase of prerenal azotemia, her diuretic therapy has been reduced without incident with the continuation of remaining components of her existing medical regimen and no further modification until blood pressure has further stabilized.   While hospitalized, the use of her external cardioverter defibrillator will not be necessary based on ongoing arrhythmia monitoring and defibrillator capabilities with needs of

## 2020-01-20 LAB
ANION GAP SERPL CALCULATED.3IONS-SCNC: 16 MMOL/L (ref 7–16)
BUN BLDV-MCNC: 26 MG/DL (ref 6–20)
CALCIUM SERPL-MCNC: 9 MG/DL (ref 8.6–10.2)
CHLORIDE BLD-SCNC: 99 MMOL/L (ref 98–107)
CO2: 21 MMOL/L (ref 22–29)
CREAT SERPL-MCNC: 0.8 MG/DL (ref 0.5–1)
GFR AFRICAN AMERICAN: >60
GFR NON-AFRICAN AMERICAN: >60 ML/MIN/1.73
GLUCOSE BLD-MCNC: 110 MG/DL (ref 74–99)
POTASSIUM SERPL-SCNC: 4.2 MMOL/L (ref 3.5–5)
PRO-BNP: 886 PG/ML (ref 0–125)
SODIUM BLD-SCNC: 136 MMOL/L (ref 132–146)

## 2020-01-20 PROCEDURE — 83880 ASSAY OF NATRIURETIC PEPTIDE: CPT

## 2020-01-20 PROCEDURE — 36415 COLL VENOUS BLD VENIPUNCTURE: CPT

## 2020-01-20 PROCEDURE — 6370000000 HC RX 637 (ALT 250 FOR IP): Performed by: INTERNAL MEDICINE

## 2020-01-20 PROCEDURE — 6360000002 HC RX W HCPCS: Performed by: INTERNAL MEDICINE

## 2020-01-20 PROCEDURE — 6370000000 HC RX 637 (ALT 250 FOR IP): Performed by: NURSE PRACTITIONER

## 2020-01-20 PROCEDURE — 99232 SBSQ HOSP IP/OBS MODERATE 35: CPT | Performed by: INTERNAL MEDICINE

## 2020-01-20 PROCEDURE — 2580000003 HC RX 258: Performed by: INTERNAL MEDICINE

## 2020-01-20 PROCEDURE — 80048 BASIC METABOLIC PNL TOTAL CA: CPT

## 2020-01-20 PROCEDURE — 94640 AIRWAY INHALATION TREATMENT: CPT

## 2020-01-20 PROCEDURE — 2140000000 HC CCU INTERMEDIATE R&B

## 2020-01-20 PROCEDURE — APPSS45 APP SPLIT SHARED TIME 31-45 MINUTES: Performed by: NURSE PRACTITIONER

## 2020-01-20 RX ORDER — BUMETANIDE 1 MG/1
2 TABLET ORAL 2 TIMES DAILY
Status: DISCONTINUED | OUTPATIENT
Start: 2020-01-20 | End: 2020-01-21

## 2020-01-20 RX ADMIN — BUMETANIDE 2 MG: 1 TABLET ORAL at 17:13

## 2020-01-20 RX ADMIN — Medication 10 ML: at 09:16

## 2020-01-20 RX ADMIN — PANTOPRAZOLE SODIUM 40 MG: 40 GRANULE, DELAYED RELEASE ORAL at 05:57

## 2020-01-20 RX ADMIN — ALBUTEROL SULFATE 2.5 MG: 2.5 SOLUTION RESPIRATORY (INHALATION) at 22:41

## 2020-01-20 RX ADMIN — ALBUTEROL SULFATE 2.5 MG: 2.5 SOLUTION RESPIRATORY (INHALATION) at 08:29

## 2020-01-20 RX ADMIN — METOPROLOL SUCCINATE 37.5 MG: 25 TABLET, FILM COATED, EXTENDED RELEASE ORAL at 20:04

## 2020-01-20 RX ADMIN — METOPROLOL SUCCINATE 37.5 MG: 25 TABLET, FILM COATED, EXTENDED RELEASE ORAL at 09:11

## 2020-01-20 RX ADMIN — ZOLPIDEM TARTRATE 5 MG: 5 TABLET ORAL at 22:13

## 2020-01-20 RX ADMIN — DOCUSATE SODIUM 100 MG: 50 LIQUID ORAL at 20:05

## 2020-01-20 RX ADMIN — ALBUTEROL SULFATE 2.5 MG: 2.5 SOLUTION RESPIRATORY (INHALATION) at 17:50

## 2020-01-20 RX ADMIN — SACUBITRIL AND VALSARTAN 1 TABLET: 24; 26 TABLET, FILM COATED ORAL at 20:05

## 2020-01-20 RX ADMIN — Medication 10 ML: at 20:05

## 2020-01-20 RX ADMIN — SPIRONOLACTONE 25 MG: 25 TABLET ORAL at 09:12

## 2020-01-20 RX ADMIN — SACUBITRIL AND VALSARTAN 1 TABLET: 24; 26 TABLET, FILM COATED ORAL at 09:11

## 2020-01-20 RX ADMIN — BUMETANIDE 2 MG: 1 TABLET ORAL at 09:12

## 2020-01-20 RX ADMIN — ALBUTEROL SULFATE 2.5 MG: 2.5 SOLUTION RESPIRATORY (INHALATION) at 14:17

## 2020-01-20 RX ADMIN — ENOXAPARIN SODIUM 40 MG: 40 INJECTION SUBCUTANEOUS at 09:11

## 2020-01-20 ASSESSMENT — PAIN SCALES - GENERAL
PAINLEVEL_OUTOF10: 0

## 2020-01-20 NOTE — PROGRESS NOTES
INPATIENT CARDIOLOGY FOLLOW-UP    Name: Ana Presley    Age: 64 y.o. Date of Admission: 1/13/2020  7:02 AM    Date of Service: 1/20/2020    Chief Complaint: Follow-up for ischemic cardiomyopathy, chronic systolic heart failure, left bundle branch block    Interim History: The patient presently remains stable from a cardiovascular standpoint with no further episodes of symptomatic hypotension and acceptable blood pressures with present therapy. Renal function and electrolytes remain stable with a slight improvement of her prerenal azotemia and ongoing decrease of her proBNP levels. Review of Systems: The remainder of a complete multisystem review including consitutional, central nervous, respiratory, circulatory, gastrointestinal, genitourinary, endocrinologic, hematologic, musculoskeletal and psychiatric are negative.     Problem List:  Patient Active Problem List   Diagnosis    Admitted for acute congestive heart failure (HCC)    Hyperglycemia    Pleural effusion on right    Elevated blood pressure reading without diagnosis of hypertension    Hypokalemia    New onset of congestive heart failure (HCC)       Allergies:  No Known Allergies    Current Medications:  Current Facility-Administered Medications   Medication Dose Route Frequency Provider Last Rate Last Dose    bumetanide (BUMEX) tablet 2 mg  2 mg Oral BID Giacomo Saenz MD        metoprolol succinate (TOPROL XL) extended release tablet 37.5 mg  37.5 mg Oral BID Giacomo Saenz MD   37.5 mg at 01/19/20 0854    spironolactone (ALDACTONE) tablet 25 mg  25 mg Oral Daily Yohannes Douglass MD   25 mg at 01/19/20 0854    sodium chloride flush 0.9 % injection 10 mL  10 mL Intravenous 2 times per day Halle Doll MD   10 mL at 01/19/20 2033    sodium chloride flush 0.9 % injection 10 mL  10 mL Intravenous PRN Halle Doll MD        acetaminophen (TYLENOL) tablet 650 mg  650 mg Oral Q4H PRN Halle Doll MD   650 mg at 01/19/20 2041    magnesium hydroxide (MILK OF MAGNESIA) 400 MG/5ML suspension 30 mL  30 mL Oral Daily PRN Jon Borjas MD        pantoprazole sodium (PROTONIX) packet 40 mg  40 mg Oral QAM AC Dru Kendal Zac, DO   40 mg at 01/20/20 0557    docusate sodium (COLACE) 150 MG/15ML liquid 100 mg  100 mg Oral Nightly Dru Kendal Zac, DO   100 mg at 01/19/20 2032    zolpidem (AMBIEN) tablet 5 mg  5 mg Oral Nightly PRN Gary Poll, DO   5 mg at 01/19/20 2206    sacubitril-valsartan (ENTRESTO) 24-26 MG per tablet 1 tablet  1 tablet Oral BID Chrissy Pacheco MD   1 tablet at 01/19/20 1017    perflutren lipid microspheres (DEFINITY) injection 1.65 mg  1.5 mL Intravenous ONCE PRN Dru Kendal Zac, DO        albuterol (PROVENTIL) nebulizer solution 2.5 mg  2.5 mg Nebulization 4x daily Dru Kendal Zac, DO   2.5 mg at 01/19/20 1846    enoxaparin (LOVENOX) injection 40 mg  40 mg Subcutaneous Daily Dru Kendal Zac, DO   40 mg at 01/19/20 0855         Physical Exam:  BP 99/75   Pulse 84   Temp 98 °F (36.7 °C) (Oral)   Resp 16   Ht 5' 4\" (1.626 m)   Wt 115 lb 1.6 oz (52.2 kg)   SpO2 97%   BMI 19.76 kg/m²   Weight change: 6.4 oz (0.181 kg)  Wt Readings from Last 3 Encounters:   01/20/20 115 lb 1.6 oz (52.2 kg)     The patient is awake, alert and in no discomfort or distress. No gross musculoskeletal deformity is present. No significant skin or nail changes are present. Gross examination of head, eyes, nose and throat are negative. Jugular venous pressure is normal and no carotid bruits are present. Normal respiratory effort is noted with no accessory muscle usage present. Lung fields are clear to ascultation. Cardiac examination is notable for a regular rate and rhythm with no palpable thrill. No gallop rhythm or cardiac murmur are identified. A benign abdominal examination is present with no masses or organomegaly. Intact pulses are present throughout all extremities and no peripheral edema is present.  No focal neurologic deficits are present. Intake/Output:    Intake/Output Summary (Last 24 hours) at 1/20/2020 0819  Last data filed at 1/20/2020 0603  Gross per 24 hour   Intake 1540 ml   Output 2400 ml   Net -860 ml     No intake/output data recorded. Laboratory Tests:  Lab Results   Component Value Date    CREATININE 0.8 01/20/2020    BUN 26 (H) 01/20/2020     01/20/2020    K 4.2 01/20/2020    CL 99 01/20/2020    CO2 21 (L) 01/20/2020     No results for input(s): CKTOTAL, CKMB in the last 72 hours. Invalid input(s): TROPONONI  No results found for: BNP  Lab Results   Component Value Date    WBC 9.2 01/13/2020    RBC 4.69 01/13/2020    HGB 13.3 01/13/2020    HCT 41.4 01/13/2020    MCV 88.3 01/13/2020    MCH 28.4 01/13/2020    MCHC 32.1 01/13/2020    RDW 15.1 01/13/2020     01/13/2020    MPV 10.7 01/13/2020     No results for input(s): ALKPHOS, ALT, AST, PROT, BILITOT, BILIDIR, LABALBU in the last 72 hours. No results found for: MG  No results found for: PROTIME, INR  Lab Results   Component Value Date    TSH 2.340 01/16/2020     No components found for: CHLPL  No results found for: TRIG  No results found for: HDL  No results found for: [facility]    Cardiac Tests:  Telemetry findings reviewed: Sinus rhythm with occasional ventricular ectopy, no new tachy/bradyarrhythmias overnight      ASSESSMENT / PLAN: On a clinical basis, the patient remains stable from a cardiovascular standpoint and tolerant of her existing medical regimen without difficulty. She appears clinically euvolemic with significant ongoing improvement of her proBNP levels and relative hypotension in the face of her existing therapy. Presently, her diuretics will be converted to oral administration with need of careful monitoring of her volume status as well as that of blood pressure and renal function and electrolytes to optimize medical therapy.   An additional 24 hours of hospital observation is necessary prior to the duration of discharge with needs of careful outpatient monitoring to reduce risk of readmission. Additional input of the acute heart failure service is pending. At time of discharge, the application of her external cardioverter defibrillator will be necessary for arrhythmia protection against sudden cardiac death the following hospital discharge. Note: This report was completed utilizing computer voice recognition software. Every effort has been made to ensure accuracy, however; inadvertent computerized transcription errors may be present. Abel Wolf.  [facility], [address] [facility]

## 2020-01-20 NOTE — PROGRESS NOTES
01/14/2020    CKMB 4.1 01/14/2020    TROPONINI <0.01 01/14/2020     Lab Results   Component Value Date    TSH 2.340 01/16/2020     Lab Results   Component Value Date    LABA1C 5.6 01/13/2020       Current Medications:  Current Facility-Administered Medications   Medication Dose Route Frequency Provider Last Rate Last Dose    bumetanide (BUMEX) tablet 2 mg  2 mg Oral BID Cahd Luna MD        metoprolol succinate (TOPROL XL) extended release tablet 37.5 mg  37.5 mg Oral BID Chad Luna MD   37.5 mg at 01/19/20 0854    spironolactone (ALDACTONE) tablet 25 mg  25 mg Oral Daily Ellyn Gottron, MD   25 mg at 01/19/20 0854    sodium chloride flush 0.9 % injection 10 mL  10 mL Intravenous 2 times per day Ananth Orellana MD   10 mL at 01/19/20 2033    sodium chloride flush 0.9 % injection 10 mL  10 mL Intravenous PRN Ananth Orellana MD        acetaminophen (TYLENOL) tablet 650 mg  650 mg Oral Q4H PRN Ananth Orellana MD   650 mg at 01/19/20 2041    magnesium hydroxide (MILK OF MAGNESIA) 400 MG/5ML suspension 30 mL  30 mL Oral Daily PRN Ananth Orellana MD        pantoprazole sodium (PROTONIX) packet 40 mg  40 mg Oral QAM AC Dru Funes, DO   40 mg at 01/20/20 0557    docusate sodium (COLACE) 150 MG/15ML liquid 100 mg  100 mg Oral Nightly Dru Funes, DO   100 mg at 01/19/20 2032    zolpidem (AMBIEN) tablet 5 mg  5 mg Oral Nightly PRN [address], DO   5 mg at 01/19/20 2206    sacubitril-valsartan (ENTRESTO) 24-26 MG per tablet 1 tablet  1 tablet Oral BID Ellyn Gottron, MD   1 tablet at 01/19/20 1017    perflutren lipid microspheres (DEFINITY) injection 1.65 mg  1.5 mL Intravenous ONCE PRN Dru Funes, DO        albuterol (PROVENTIL) nebulizer solution 2.5 mg  2.5 mg Nebulization 4x daily [address], DO   2.5 mg at 01/20/20 0829    enoxaparin (LOVENOX) injection 40 mg  40 mg Subcutaneous Daily Dru Funes DO   40 mg at 01/19/20 0855       IMAGING:     CXR (1/13/2020)  Impression  Tortuous ectatic aorta  Cardiomegaly   Airspace disease compatible with atelectasis or pneumonia, at both  lung bases, likely with associated pleural effusion        CTA Chest (1/13/2020)  Impression:  Cardiomegaly with small pericardial effusion, the left ventricle is dilated, the overall appearance of the chest suggest congestive heart failure  Bilateral pleural effusions and infiltrates  Mild airspace disease suggesting pulmonary edema     CARDIAC TESTING:     TTE (1/14/2020, Dr. Venecia Haile)   Summary:   Moderately dilated left ventricle.   Abnormal (paradoxical) motion consistent with left bundle branch block.   Severely reduced left ventricular systolic function.   There is doppler evidence of stage I diastolic dysfunction.   The left atrium is severely dilated.   Right ventricle global systolic function is reduced.   Severe posterior mitral regurgitation is present.   Mild tricuspid regurgitation.  Brittany Gianni is a small circumferential pericardial effusion noted. [facility] (1/16/2020)  HEMODYNAMICS:  1. Aortic pressure 104/85 mmHg. 2.  Left ventricular end-diastolic pressure 37 mmHg. CORONARY ANATOMY:  1. Left main:  It is medium in size with no angiographic stenosis noted. 2.  LAD:  It is large in caliber and wraps around the apex and gives rise to one diagonal branch. No angiographic stenosis noted. 3.  Ramus: It is a very small branch. 4.  Left circumflex: It is fair in size and giving rise to a large bifurcating obtuse marginal branch, then continues to AV groove in three  or four small branches. No angiographic stenosis noted. 5.  RCA:  It is large and dominant giving rise to a conus branch, an SA jamar or AV jamar branch, an RV marginal branch, then a PDA and PLV branch. No angiographic stenosis noted. Left ventriculogram revealed significantly dilated left ventricle with an ejection fraction of 10% to 15%. There was severe mitral  regurgitation noted. IMPRESSION:  1. Absence of angiographic coronary stenosis. 2.  Significantly elevated left ventricular end-diastolic pressure at 37 mmHg. 3.  Dilated left ventricle with an ejection fraction of 10% to 15%. 4.  Severe mitral regurgitation. EKG  Sinus rhythm  Right axis deviation  LBBB         ASSESSMENT:  1. Acute HFrEF - index hospitalization  2. ACC stage C / NYHA class II - III  3. Hypervolemic - LVEDP 37 on LHC > bumex gtt > improved symptoms. 4. Nonischemic cardiomyopathy   5. LVEF 15%, LVEDD 65, LVMI 144  6. Severe secondary MR (ERO 0.3 cm2)  7. RV dysfunction ( TAPSE 1.4)  8. Moderate TR  9. LBBB,         PLAN:  1. Continue toprol / spironolactone / entresto and bumex  2. Potentially titrate toprol to 50 mg BID prior to discharge  3. Monitor on PO bumex overnight  4. Potentially discharge tomorrow. 5. Close HF follow up  6. Plan for outpatient cardiac MRI        Above case discussed with Dr. Billy Alexander    Thank you for allowing us to participate in the care of this patient. We will follow as medical course develops. Do not hesitate to contact us with further questions. Merlin MENA-CNP  Heart Failure and Pulmonary Hypertension  [facility].

## 2020-01-20 NOTE — PROGRESS NOTES
Nutrition Assessment    Type and Reason for Visit: Initial    Nutrition Recommendations: Continue current diet as tolerated     Nutrition Assessment:  Patient assessed for nutritional risk. Deemed to be at low risk at this time. Will continue to monitor for changes in status.     Malnutrition Assessment:  · Malnutrition Status: No malnutrition    Nutrition Risk Level   Risk Level: Low    Nutrition Diagnosis:   · Problem: No nutrition diagnosis at this time    Nutrition Intervention:  Food and/or Delivery: Continue current diet  Nutrition Education/Counseling/Coordination of Care:  Continued Inpatient Monitoring, Coordination of Care      Electronically signed by Damir Erickson MS, RD, LD on 1/20/20 at 2:37 PM    Contact Number: [phone]

## 2020-01-20 NOTE — PROGRESS NOTES
Hospitalist Progress Note      PCP: Isi Santiago MD    Date of Admission: 1/13/2020    Chief Complaint: THE [facility] Hospital Course: *pt had been relatively healthy, noticed she was starting to become SOB, coughing, non productive, nauseated, with chills, and had CP located ACW pressure in character, non radiating. She also noticed that she was gaining weight in her stomach.  found to be in CHF** echo shows severe cardiomyopathy.  *  CHF specialist consulted for LHC , normal coronaries, , had  PAF last pm   Life vest today ** starting entresto, RRT called for syncopal episode. BP systolic was 82-04. Dr. John Hawkins notified.    **  Diuretic reduced, will change ot po in 24 hours**     Subjective: **     no complaints  Refers no shortness of breath, no chest pain      Medications:  Reviewed    Infusion Medications   Scheduled Medications    bumetanide  2 mg Oral BID    metoprolol succinate  37.5 mg Oral BID    spironolactone  25 mg Oral Daily    sodium chloride flush  10 mL Intravenous 2 times per day    pantoprazole sodium  40 mg Oral QAM AC    docusate sodium  100 mg Oral Nightly    sacubitril-valsartan  1 tablet Oral BID    albuterol  2.5 mg Nebulization 4x daily    enoxaparin  40 mg Subcutaneous Daily     PRN Meds: sodium chloride flush, acetaminophen, magnesium hydroxide, zolpidem, perflutren lipid microspheres      Intake/Output Summary (Last 24 hours) at 1/20/2020 1207  Last data filed at 1/20/2020 1145  Gross per 24 hour   Intake 940 ml   Output 2300 ml   Net -1360 ml       Exam:    BP 95/66   Pulse 87   Temp 97.5 °F (36.4 °C) (Temporal)   Resp 18   Ht 5' 4\" (1.626 m)   Wt 115 lb 1.6 oz (52.2 kg)   SpO2 100%   BMI 19.76 kg/m²     Gen:  Well developed   HEENT: NC/AT, moist mucous membranes,   Neck: supple, trachea midline,   Heart:  Normal s1/s2, RRR, no murmurs, gallops, or rubs.    Lungs:   cta * bilaterally, *  Abd: bowel sounds present, soft, nontender, nondistended, no masses  Extrem:

## 2020-01-21 ENCOUNTER — TELEPHONE (OUTPATIENT)
Dept: CARDIOLOGY CLINIC | Age: 60
End: 2020-01-21

## 2020-01-21 VITALS
HEIGHT: 64 IN | DIASTOLIC BLOOD PRESSURE: 56 MMHG | HEART RATE: 75 BPM | WEIGHT: 114.3 LBS | BODY MASS INDEX: 19.52 KG/M2 | OXYGEN SATURATION: 100 % | RESPIRATION RATE: 18 BRPM | TEMPERATURE: 98.7 F | SYSTOLIC BLOOD PRESSURE: 96 MMHG

## 2020-01-21 LAB
ANION GAP SERPL CALCULATED.3IONS-SCNC: 18 MMOL/L (ref 7–16)
BUN BLDV-MCNC: 22 MG/DL (ref 6–20)
CALCIUM SERPL-MCNC: 9 MG/DL (ref 8.6–10.2)
CHLORIDE BLD-SCNC: 99 MMOL/L (ref 98–107)
CO2: 22 MMOL/L (ref 22–29)
CREAT SERPL-MCNC: 0.8 MG/DL (ref 0.5–1)
GFR AFRICAN AMERICAN: >60
GFR NON-AFRICAN AMERICAN: >60 ML/MIN/1.73
GLUCOSE BLD-MCNC: 98 MG/DL (ref 74–99)
POTASSIUM SERPL-SCNC: 3.5 MMOL/L (ref 3.5–5)
SODIUM BLD-SCNC: 139 MMOL/L (ref 132–146)

## 2020-01-21 PROCEDURE — 6370000000 HC RX 637 (ALT 250 FOR IP): Performed by: INTERNAL MEDICINE

## 2020-01-21 PROCEDURE — 2580000003 HC RX 258: Performed by: INTERNAL MEDICINE

## 2020-01-21 PROCEDURE — 6360000002 HC RX W HCPCS: Performed by: INTERNAL MEDICINE

## 2020-01-21 PROCEDURE — 99233 SBSQ HOSP IP/OBS HIGH 50: CPT | Performed by: INTERNAL MEDICINE

## 2020-01-21 PROCEDURE — 36415 COLL VENOUS BLD VENIPUNCTURE: CPT

## 2020-01-21 PROCEDURE — 6370000000 HC RX 637 (ALT 250 FOR IP): Performed by: NURSE PRACTITIONER

## 2020-01-21 PROCEDURE — 80048 BASIC METABOLIC PNL TOTAL CA: CPT

## 2020-01-21 PROCEDURE — APPSS45 APP SPLIT SHARED TIME 31-45 MINUTES: Performed by: NURSE PRACTITIONER

## 2020-01-21 PROCEDURE — 94640 AIRWAY INHALATION TREATMENT: CPT

## 2020-01-21 RX ORDER — SPIRONOLACTONE 25 MG/1
25 TABLET ORAL DAILY
Qty: 30 TABLET | Refills: 0 | Status: SHIPPED | OUTPATIENT
Start: 2020-01-22 | End: 2020-02-25

## 2020-01-21 RX ORDER — BUMETANIDE 1 MG/1
1 TABLET ORAL 2 TIMES DAILY
Qty: 60 TABLET | Refills: 0 | Status: SHIPPED | OUTPATIENT
Start: 2020-01-21 | End: 2020-01-28

## 2020-01-21 RX ORDER — PANTOPRAZOLE SODIUM 40 MG/1
40 GRANULE, DELAYED RELEASE ORAL
Qty: 30 PACKET | Refills: 0 | Status: SHIPPED | OUTPATIENT
Start: 2020-01-22 | End: 2020-02-25

## 2020-01-21 RX ORDER — METOPROLOL SUCCINATE 50 MG/1
50 TABLET, EXTENDED RELEASE ORAL 2 TIMES DAILY
Qty: 60 TABLET | Refills: 0 | Status: SHIPPED | OUTPATIENT
Start: 2020-01-21 | End: 2020-02-25

## 2020-01-21 RX ORDER — POTASSIUM CHLORIDE 20 MEQ/1
40 TABLET, EXTENDED RELEASE ORAL ONCE
Status: DISCONTINUED | OUTPATIENT
Start: 2020-01-21 | End: 2020-01-21 | Stop reason: SDUPTHER

## 2020-01-21 RX ORDER — METOPROLOL SUCCINATE 50 MG/1
50 TABLET, EXTENDED RELEASE ORAL 2 TIMES DAILY
Status: DISCONTINUED | OUTPATIENT
Start: 2020-01-21 | End: 2020-01-21 | Stop reason: HOSPADM

## 2020-01-21 RX ORDER — BUMETANIDE 1 MG/1
1 TABLET ORAL 2 TIMES DAILY
Status: DISCONTINUED | OUTPATIENT
Start: 2020-01-21 | End: 2020-01-21 | Stop reason: HOSPADM

## 2020-01-21 RX ADMIN — ALBUTEROL SULFATE 2.5 MG: 2.5 SOLUTION RESPIRATORY (INHALATION) at 12:23

## 2020-01-21 RX ADMIN — ALBUTEROL SULFATE 2.5 MG: 2.5 SOLUTION RESPIRATORY (INHALATION) at 07:49

## 2020-01-21 RX ADMIN — METOPROLOL SUCCINATE 50 MG: 50 TABLET, EXTENDED RELEASE ORAL at 10:27

## 2020-01-21 RX ADMIN — SPIRONOLACTONE 25 MG: 25 TABLET ORAL at 10:26

## 2020-01-21 RX ADMIN — ENOXAPARIN SODIUM 40 MG: 40 INJECTION SUBCUTANEOUS at 10:27

## 2020-01-21 RX ADMIN — POTASSIUM BICARBONATE 40 MEQ: 782 TABLET, EFFERVESCENT ORAL at 15:25

## 2020-01-21 RX ADMIN — Medication 10 ML: at 10:27

## 2020-01-21 RX ADMIN — ACETAMINOPHEN 650 MG: 325 TABLET, FILM COATED ORAL at 13:22

## 2020-01-21 RX ADMIN — BUMETANIDE 1 MG: 1 TABLET ORAL at 10:26

## 2020-01-21 RX ADMIN — SACUBITRIL AND VALSARTAN 1 TABLET: 24; 26 TABLET, FILM COATED ORAL at 10:26

## 2020-01-21 RX ADMIN — PANTOPRAZOLE SODIUM 40 MG: 40 GRANULE, DELAYED RELEASE ORAL at 05:50

## 2020-01-21 ASSESSMENT — PAIN DESCRIPTION - ONSET: ONSET: ON-GOING

## 2020-01-21 ASSESSMENT — PAIN DESCRIPTION - DESCRIPTORS: DESCRIPTORS: ACHING;DISCOMFORT;HEADACHE

## 2020-01-21 ASSESSMENT — PAIN DESCRIPTION - FREQUENCY: FREQUENCY: INTERMITTENT

## 2020-01-21 ASSESSMENT — PAIN DESCRIPTION - LOCATION: LOCATION: HEAD

## 2020-01-21 ASSESSMENT — PAIN SCALES - GENERAL
PAINLEVEL_OUTOF10: 0
PAINLEVEL_OUTOF10: 3

## 2020-01-21 ASSESSMENT — PAIN DESCRIPTION - PAIN TYPE: TYPE: ACUTE PAIN

## 2020-01-21 ASSESSMENT — PAIN DESCRIPTION - ORIENTATION: ORIENTATION: RIGHT;LEFT;ANTERIOR

## 2020-01-21 NOTE — PROGRESS NOTES
[facility]   Department of Pharmacy   Pharmacist Transition of Care Services         Patient Demographics  Name:  Dee Dee Valencia   Medical Record Number:  [id number]  Gender:  female   Age:  64 y.o. YOB: 1960    Primary Care Physician: Melisa Sanchez MD  Primary Care Physician phone number:  [phone]  Readmission Risk (% from [facility] Patient List): 12 %     Patient plans to participate in [facility] [place] (Y/N): no    Pharmacist Review and Summary of Medications     Date of last reviewed/update: 1/21/20    Category Comments   New Medication Started   1. toprol XL 50mg PO BID  2. entresto 24-26mg PO BID  3. bumex 1mg PO BID  4. Spironolactone 25mg PO daily   5. protonix 40mg PO daily        Change in Outpatient Medication  (Dosage Form, Route,   Dose, or Frequency) 1. Discontinued Outpatient Medication   (or on Hold During Admission) 1. Other              Pharmacist Patient Education:    Date  Person Educated Content of Education    1/21/20 patient   Reviewed toprol XL, entresto, bumex, aldactone, and protonix (focusing on side effects, frequency, and med uses.)  Emphasized adherence and the use of a pill box (she had one with her). Reviewed home medications. AVS was reviewed with patient. Patient verified understanding. Documentation of Pharmacist Interventions and Follow-up Plan:     The following Pharmacist Transition of Care Services were completed:   [x]  Reviewed and summarized medication changes  [x]  Entire home medication list was reviewed for accuracy (sources: patient)  []  Home medication list was updated or corrected     [x]  Reviewed discharge medication reconciliation  [x]  Discharge medication list was updated or corrected  [x]  Patient education was provided on new medications  [x]  Patient education was provided on medication changes  [x]  Reviewed the After Visit Summary (AVS) with patient    Additional Interventions:  [] Inpatient prescriber was contacted and the following pharmacy recommendations        were accepted:   VO: Dr. Ruiz alexis to give KCl PO 40mEq x once prior to discharge.     [] Other interventions: **        Pharmacist: Gorge Dickinson PharmD, [facility]  Date:  1/21/2020 10:50 AM  Time spent counseling on medications: 85 minutes

## 2020-01-21 NOTE — PROGRESS NOTES
CLINICAL PHARMACY NOTE: MEDS TO [address] Select Patient?: No  Total # of Prescriptions Filled: 5   The following medications were delivered to the patient:  · TOPROL XL 50 MG   · BUMEX 1 MG   · ALDACTONE 25 MG   · PROTONIX 40 MG  · ENTRESTO 24/26 MG   Total # of Interventions Completed: 4  Time Spent (min): 30    Additional Documentation:

## 2020-01-21 NOTE — PROGRESS NOTES
hospital discharge. She remains adamant regarding her needs of a prompt return to work in spite of discussion of its potential adverse effects until adequate stabilization has occurred with demonstration of insufficient insight into these adverse effects. The application of her external cardioverter defibrillator prior to discharge has been discussed. Following optimization of her medical regimen, reassessment of left ventricular systolic function will be necessary 3 to 6 months following the optimization of medical therapy to assess the benefits of therapy as well as the needs of the consideration of primary arrhythmia protection and potential cardiac resynchronization. The duration of discussion counseling in conjunction with the present encounter exceeded 35 minutes      Note: This report was completed utilizing computer voice recognition software. Every effort has been made to ensure accuracy, however; inadvertent computerized transcription errors may be present. Frieda Garcia.  [place], [address] [facility]

## 2020-01-21 NOTE — DISCHARGE SUMMARY
Hospital Medicine Discharge Summary    Patient ID: Ana Presley   Patient's PCP: Sherice Benitez MD    Admit Date: 1/13/2020   Discharge Date:      Admitting Physician: Mayra Butler DO  Discharge Physician: Joey Brown MD     Discharge Diagnoses:    PAF  Acute systolic heart failure  Cardiomyopathy  Severe MR  RV dysfunction  Hypokalemia     Hospital course in brief (Please refer to daily progress notes for a comprehensive review of the hospitalization by requesting medical records)    64year old female with no prior cardiac history who presented to [facility] ED on 1/13/2020 with complaints of increased shortness of breath, fatigue, a dry nonproductive cough and decreased appetite. One week prior to arrival she was treated on an outpatient basis for pneumonia with no improvement of symptoms. She was admitted for acute heart failure and underwent TTE that demonstrated severe LV dysfunction (LVEF 15%, LVEDD 65, LVMI 144), severe secondary MR (ERO 0.3 cm2), RV dysfunction ( TAPSE 1.4) and moderate TR. She was initiated on GDMT with lisinopril and toprol.      Patient was seen by cardiology and advanced heart failure service . medication has been adjusted during this admission. Outpatient MRI, Daily weights Life Vest for primary prevention  Repeat TTE > 90 days after achieving optimal GDMT/    Please see consultations for details    PHYSICAL EXAM:  BP (!) 96/56   Pulse 75   Temp 98.7 °F (37.1 °C) (Oral)   Resp 18   Ht 5' 4\" (1.626 m)   Wt 114 lb 4.8 oz (51.8 kg)   SpO2 100%   BMI 19.62 kg/m²     General appearance: No apparent distress appears stated age and cooperative. HEENT Normal cephalic, atraumatic without obvious deformity. Pupils equal, round, and reactive to light. Extra ocular muscles intact. Conjunctivae/corneas clear. Neck: Supple, No jugular venous distention/bruits. Trachea midline without thyromegaly or adenopathy with full range of motion.   Lungs: Clear to auscultation, bilaterally without Rales/Wheezes/Rhonchi with good respiratory effort. Heart: RS1/S2   Abdomen: Soft, non-tender or non-distended without rigidity or guarding and positive bowel sounds all four quadrants. Extremities: No clubbing, cyanosis, or edema bilaterally. Skin: Skin color, texture, turgor normal.  No rashes or lesions. Neurologic: Alert and oriented X 3, neurovascularly intact with sensory/motor intact upper extremities/lower extremities, bilaterally. Cranial nerves: II-XII intact, grossly non-focal.  Mental status: Alert, oriented, thought content appropriate. Consults:   IP CONSULT TO INTERNAL MEDICINE  IP CONSULT TO CARDIOLOGY  IP CONSULT TO CARDIOLOGY  IP CONSULT TO HEART FAILURE NURSE/COORDINATOR    Discharge Instructions / Follow up: Up with cardiology  Follow-up with PCP    Activity: activity as tolerated    Significant labs:    Labs: For convenience and continuity at follow-up the following most recent labs are provided:  CBC:   No results for input(s): WBC, RBC, HGB, HCT, MCV, RDW, PLT in the last 72 hours. BMP:   Recent Labs     20  0545 20  0614 20  0523    136 139   K 4.1 4.2 3.5   CL 96* 99 99   CO2 22 21* 22   BUN 30* 26* 22*   CREATININE 0.9 0.8 0.8     LFT:  No results for input(s): PROT, ALB, ALKPHOS, ALT, AST, BILITOT, AMYLASE, LIPASE in the last 72 hours. PT/INR: No results for input(s): INR, APTT in the last 72 hours. BNP: No results for input(s): BNP in the last 72 hours.   Hgb A1C:   Lab Results   Component Value Date    LABA1C 5.6 2020     Folate and B12: No results found for: Sudha Scale, No results found for: FOLATE  Thyroid Studies:   Lab Results   Component Value Date    TSH 2.340 2020       Urinalysis:  No results found for: NITRU, WBCUA, BACTERIA, RBCUA, BLOODU, SPECGRAV, GLUCOSEU    Imaging:  Xr Chest Standard (2 Vw)    Result Date: 2020  Patient MRN: [id number] : 1960 Age:  64 years Gender: Female Order Date: 2020

## 2020-01-21 NOTE — PROGRESS NOTES
CLINICAL PHARMACY: DISCHARGE MED RECONCILIATION/REVIEW    [facility]) Select Patient?: No  Total # of Interventions Recommended: 1   -   Total # Interventions Accepted: 1  Intervention Severity:   - Level 1 Intervention Present?: No   - Level 2 #: 1   - Level 3 #: 0   Time Spent (min): 85    Additional Documentation:

## 2020-01-21 NOTE — PROGRESS NOTES
Advanced Heart Failure and Pulmonary Hypertension  Inpatient Progress Note          CC/ID: Index hospitalization for acute heart failure       24-hour events/subjective:  - no acute events overnight  - subjective improved  - good urinary response to transition from IV bumex to PO  - uop yesterday 2.5 L  - net negative -6.5 L  - 11 lb weight loss since admission  - asymptomatic hypotension, up walking around room and hallways without complaints        Telemetry:  Sinus Rhythm         Inotropes, Pressors, and Intravenous Therapy:  Bumex gtt > OFF      Physical Exam:    BP (!) 96/56   Pulse 75   Temp 98.7 °F (37.1 °C) (Oral)   Resp 18   Ht 5' 4\" (1.626 m)   Wt 114 lb 4.8 oz (51.8 kg)   SpO2 100%   BMI 19.62 kg/m²   Wt Readings from Last 3 Encounters:   01/21/20 114 lb 4.8 oz (51.8 kg)     Appearance: Awake, alert, no acute respiratory distress  Skin: Intact, no rash  Head: Normocephalic, atraumatic  Eyes: EOMI, no conjunctival erythema, anicteric sclerae  ENMT: No pharyngeal erythema, MMM, no rhinorrhea  Neck: Soft, supple, no jvd/hjr. Lungs: Clear to auscultation bilaterally. No wheezes, rales, or rhonchi. Cardiac: Regular rate and rhythm, +S1S2, no murmurs apparent  Abdomen: Soft, nontender, +bowel sounds  Extremities: Moves all extremities x 4, no lower extremity edema. Warm and well perfused.    Neurologic: No focal motor deficits apparent, normal mood and affect  Peripheral Pulses: Intact posterior tibial pulses bilaterally        Laboratory Tests:    Lab Results   Component Value Date    CREATININE 0.8 01/21/2020    BUN 22 (H) 01/21/2020     01/21/2020    K 3.5 01/21/2020    CL 99 01/21/2020    CO2 22 01/21/2020     Lab Results   Component Value Date    WBC 9.2 01/13/2020    HGB 13.3 01/13/2020    HCT 41.4 01/13/2020    MCV 88.3 01/13/2020     01/13/2020     Lab Results   Component Value Date    CKTOTAL 100 01/14/2020    CKMB 4.1 01/14/2020    TROPONINI <0.01 01/14/2020     Lab Results Airspace disease compatible with atelectasis or pneumonia, at both  lung bases, likely with associated pleural effusion        CTA Chest (1/13/2020)  Impression:  Cardiomegaly with small pericardial effusion, the left ventricle is dilated, the overall appearance of the chest suggest congestive heart failure  Bilateral pleural effusions and infiltrates  Mild airspace disease suggesting pulmonary edema     CARDIAC TESTING:     TTE (1/14/2020, Dr. Onesimo Samuel)   Summary:   Moderately dilated left ventricle.   Abnormal (paradoxical) motion consistent with left bundle branch block.   Severely reduced left ventricular systolic function.   There is doppler evidence of stage I diastolic dysfunction.   The left atrium is severely dilated.   Right ventricle global systolic function is reduced.   Severe posterior mitral regurgitation is present.   Mild tricuspid regurgitation.  [employer] is a small circumferential pericardial effusion noted. [facility] (1/16/2020)  HEMODYNAMICS:  1. Aortic pressure 104/85 mmHg. 2.  Left ventricular end-diastolic pressure 37 mmHg. CORONARY ANATOMY:  1. Left main:  It is medium in size with no angiographic stenosis noted. 2.  LAD:  It is large in caliber and wraps around the apex and gives rise to one diagonal branch. No angiographic stenosis noted. 3.  Ramus: It is a very small branch. 4.  Left circumflex: It is fair in size and giving rise to a large bifurcating obtuse marginal branch, then continues to AV groove in three  or four small branches. No angiographic stenosis noted. 5.  RCA:  It is large and dominant giving rise to a conus branch, an SA jamar or AV jamar branch, an RV marginal branch, then a PDA and PLV branch. No angiographic stenosis noted. Left ventriculogram revealed significantly dilated left ventricle with an ejection fraction of 10% to 15%. There was severe mitral  regurgitation noted. IMPRESSION:  1. Absence of angiographic coronary stenosis.   2.  Significantly

## 2020-01-21 NOTE — PLAN OF CARE
Problem: Musculor/Skeletal Functional Status  Goal: Highest potential functional level  1/21/2020 0217 by Judit Arauz RN  Outcome: Met This Shift  1/20/2020 1928 by Alexandria Short RN  Outcome: Met This Shift  Goal: Absence of falls  1/21/2020 0217 by Judit Arauz RN  Outcome: Met This Shift  1/20/2020 1928 by Alexandria Short RN  Outcome: Met This Shift     Problem: Tissue Perfusion - Cardiopulmonary, Altered:  Goal: Absence of angina  Description  Absence of angina  1/21/2020 0217 by Judit Arauz RN  Outcome: Met This Shift  1/20/2020 1928 by Alexandria Short RN  Outcome: Met This Shift  Goal: Hemodynamic stability will improve  Description  Hemodynamic stability will improve  1/21/2020 0217 by Judit Arauz RN  Outcome: Met This Shift  1/20/2020 1928 by Alexandria Short RN  Outcome: Met This Shift     Problem: HEMODYNAMIC STATUS  Goal: Patient has stable vital signs and fluid balance  1/21/2020 0217 by Judit Arauz RN  Outcome: Met This Shift  1/20/2020 1928 by Alexandria Short RN  Outcome: Met This Shift     Problem: Pain:  Goal: Pain level will decrease  Description  Pain level will decrease  1/21/2020 0217 by Judit Arauz RN  Outcome: Met This Shift  1/20/2020 1928 by Alexandria Short RN  Outcome: Met This Shift  Goal: Control of acute pain  Description  Control of acute pain  1/21/2020 0217 by Judit Arauz RN  Outcome: Met This Shift  1/20/2020 1928 by Alexandria Short RN  Outcome: Met This Shift  Goal: Control of chronic pain  Description  Control of chronic pain  1/20/2020 1928 by Alexandria Short RN  Outcome: Met This Shift
Problem: Musculor/Skeletal Functional Status  Goal: Highest potential functional level  Outcome: Met This Shift  Goal: Absence of falls  Outcome: Met This Shift
Problem: Musculor/Skeletal Functional Status  Goal: Highest potential functional level  Outcome: Met This Shift  Goal: Absence of falls  Outcome: Met This Shift     Problem: Tissue Perfusion - Cardiopulmonary, Altered:  Goal: Absence of angina  Description  Absence of angina  Outcome: Met This Shift  Goal: Hemodynamic stability will improve  Description  Hemodynamic stability will improve  Outcome: Met This Shift     Problem: HEMODYNAMIC STATUS  Goal: Patient has stable vital signs and fluid balance  Outcome: Met This Shift     Problem: Pain:  Goal: Pain level will decrease  Description  Pain level will decrease  Outcome: Met This Shift  Goal: Control of acute pain  Description  Control of acute pain  Outcome: Met This Shift
Problem: Musculor/Skeletal Functional Status  Goal: Highest potential functional level  Outcome: Met This Shift  Goal: Absence of falls  Outcome: Met This Shift     Problem: Tissue Perfusion - Cardiopulmonary, Altered:  Goal: Absence of angina  Description  Absence of angina  Outcome: Met This Shift  Goal: Hemodynamic stability will improve  Description  Hemodynamic stability will improve  Outcome: Met This Shift     Problem: HEMODYNAMIC STATUS  Goal: Patient has stable vital signs and fluid balance  Outcome: Met This Shift     Problem: Pain:  Goal: Pain level will decrease  Description  Pain level will decrease  Outcome: Met This Shift  Goal: Control of acute pain  Description  Control of acute pain  Outcome: Met This Shift  Goal: Control of chronic pain  Description  Control of chronic pain  Outcome: Met This Shift
Problem: Musculor/Skeletal Functional Status  Goal: Highest potential functional level  Outcome: Met This Shift  Goal: Absence of falls  Outcome: Met This Shift     Problem: Tissue Perfusion - Cardiopulmonary, Altered:  Goal: Absence of angina  Description  Absence of angina  Outcome: Met This Shift  Goal: Hemodynamic stability will improve  Description  Hemodynamic stability will improve  Outcome: Met This Shift     Problem: HEMODYNAMIC STATUS  Goal: Patient has stable vital signs and fluid balance  Outcome: Met This Shift     Problem: Pain:  Goal: Pain level will decrease  Description  Pain level will decrease  Outcome: Met This Shift  Goal: Control of acute pain  Description  Control of acute pain  Outcome: Met This Shift  Goal: Control of chronic pain  Description  Control of chronic pain  Outcome: Met This Shift
the Heart Failure Zones    Instructed  to call 911 if you have any of the following symptoms:    ·    Struggling to breathe unrelieved with rest,  ·    Having chest pain, confusion or can't think clearly  ·    Have confusion or cant think clearly    Readmission Risk Score: 10        Discharge Plan:  I placed the Heart Failure Home Instructions in her discharge instructions. Per Heart Failure GWTG, the patient should have a follow-up appointment made within 7 days of discharge. Home with [address] BSN, RN, CHFN    Heart Failure Navigator    CONGESTIVE HEART FAILURE (CHF) GUIDELINES  (Must be completed for Primary Diagnosis CHF or History of CHF)    Type of CHF:    [x] Acute   [] Chronic     [] Acute on Chronic     Ventricular Function Assessment (check):             [] HFpEF  [] HFpEF, borderline  [] HFpEF, improved  [x] HFrEF  Ejection Fraction (%): Angiotensin-Converting-Enzyme (ACE) inhibitor ordered:  [] Yes  [x] No (specify contraindication):  [] Renal Insufficiency  [] Cough  [] Hypotension  [] Allergy/angioedema  [] No left ventricular systolic dysfunction (LVSD)  [] Hyperkalemia  [] Moderate to severe aortic stenosis  [x] Other (Specify):     Angiotensin II receptor blockers (ARB) ordered:  [] Yes  [x] No (specify contraindication):  [] Renal Insufficiency  [] Hypotension  [] Allergy/angioedema  [] No LVSD  [] Hyperkalemia  [] Moderate to severe aortic stenosis  [] Other (Specify):      ARNI - Angiotensin Receptor Neprilysin Inhibitor ordered:  [x] Yes  [] No      ACC/AHA Guidelines Beta Blocker (Carvedilol, Metoprolol Succinate, or Bisoprolol) ordered:    [x] Yes  [] No (specify contraindication):  [] Bradycardia  [] Hypotension  [] LVD  [] 2nd or 3rd degree heart block  [] Bronchospastic airway disease  [] Decompensated CHF  [] Other (Specify):

## 2020-01-22 LAB
PARVOVIRUS B19 IGG ANTIBODY: 0.4 IV
PARVOVIRUS B19 IGM ANTIBODY: 0.09 IV

## 2020-01-23 LAB — EPSTEIN-BARR VCA IGM: <10 U/ML (ref 0–43.9)

## 2020-01-27 LAB
COXSACKIE B1 ANTIBODY: ABNORMAL
COXSACKIE B2 ANTIBODY: ABNORMAL
COXSACKIE B3 ANTIBODY: ABNORMAL
COXSACKIE B4 ANTIBODY: ABNORMAL
COXSACKIE B5 ANTIBODY: ABNORMAL
COXSACKIE B6 ANTIBODY: ABNORMAL

## 2020-01-28 ENCOUNTER — OFFICE VISIT (OUTPATIENT)
Dept: CARDIOLOGY CLINIC | Age: 60
End: 2020-01-28
Payer: COMMERCIAL

## 2020-01-28 VITALS
WEIGHT: 117.6 LBS | DIASTOLIC BLOOD PRESSURE: 60 MMHG | HEART RATE: 67 BPM | BODY MASS INDEX: 20.08 KG/M2 | RESPIRATION RATE: 18 BRPM | OXYGEN SATURATION: 98 % | SYSTOLIC BLOOD PRESSURE: 98 MMHG | HEIGHT: 64 IN

## 2020-01-28 PROCEDURE — G8484 FLU IMMUNIZE NO ADMIN: HCPCS | Performed by: INTERNAL MEDICINE

## 2020-01-28 PROCEDURE — 1111F DSCHRG MED/CURRENT MED MERGE: CPT | Performed by: INTERNAL MEDICINE

## 2020-01-28 PROCEDURE — 1036F TOBACCO NON-USER: CPT | Performed by: INTERNAL MEDICINE

## 2020-01-28 PROCEDURE — 3017F COLORECTAL CA SCREEN DOC REV: CPT | Performed by: INTERNAL MEDICINE

## 2020-01-28 PROCEDURE — G8420 CALC BMI NORM PARAMETERS: HCPCS | Performed by: INTERNAL MEDICINE

## 2020-01-28 PROCEDURE — 93000 ELECTROCARDIOGRAM COMPLETE: CPT | Performed by: INTERNAL MEDICINE

## 2020-01-28 PROCEDURE — 99215 OFFICE O/P EST HI 40 MIN: CPT | Performed by: INTERNAL MEDICINE

## 2020-01-28 PROCEDURE — G8427 DOCREV CUR MEDS BY ELIG CLIN: HCPCS | Performed by: INTERNAL MEDICINE

## 2020-01-28 PROCEDURE — 94618 PULMONARY STRESS TESTING: CPT | Performed by: INTERNAL MEDICINE

## 2020-01-28 RX ORDER — BUMETANIDE 1 MG/1
1 TABLET ORAL DAILY
Qty: 90 TABLET | Refills: 3 | Status: SHIPPED
Start: 2020-01-28 | End: 2020-02-25 | Stop reason: DRUGHIGH

## 2020-01-28 NOTE — PATIENT INSTRUCTIONS
1. Increase Entresto to 49-51 mg twice daily      2. Decrease bumetanide from twice daily to once daily only      3. Repeat blood work in 2 weeks      4. If the blood work looks ok, we will increase the Entresto to  mg twice daily, and decrease the bumetanide to every other day      5. Diet should sodium restricted to 2-4 grams a day. Again watch your daily weight trends and if you gain water weight please follow below instructions. If you gain 3-5 pounds in 2-3 days OR notice that you are retaining fluid in anyway just like you did before then take an extra dose of your water pill (furosemide/Lasix OR bumetanide/Bumex) every day until you lose the weight or feel better. If you notice that you have taken more than 3 extra doses in 1 week then please call and let us know. If at any time you feel that you are retaining fluid, your medications are not working, or you feel ill in anyway, then please call us for either same day appointment or the next day, and for instructions. Our goal is to keep you out of the emergency room and the hospital and we have ways to do it. You just need to call us in a timely manner. If you become sick for other reasons, and notice that you are not urinating as much, the urine is very dark, you have significant diarrhea or vomiting, then please DO NOT take your water pill and CALL US immediately. Continue to weigh yourself on a regular basis. 5. Return in 1 month for a nurse visit only, to check HR and BP and ECG. If HR is above 65 bpm, nurse to call Dr Diana Siddiqui for med changes      6. Return visit with Dr Diana Siddiqui in 2 months.  At that point we determine when last med dose to take place, in order to repeat a TTE, and visit later on ($100 co pay with each visit so trying to minimize her visits)

## 2020-01-28 NOTE — PROGRESS NOTES
Advanced Heart Failure and Pulmonary Hypertension Clinic  Follow up Visit         Reason for Visit: follow up for heart failure        Referring Physician: inpatient consultation, Dr Bhumika Paul  Primary Cardiologist: Lorraine Reinoso MD        History of Present Illness:   [facility] is a 64year old recently hospitalized with HF and found to have no LV dysfunction, in the setting of a nonischemic CM. Presumed to be viral with hypertensive component. She tolerated introduction of GDMT + diuretics and presents now in post acute HF FU. She denies recurrent hospitalizations, ED or UC visits, and denies any LifeVest alarms or shocks. She denies dyspnea with exertion, shortness of breath, or decline in overall functional capacity. She denies orthopnea, PND, nocturnal cough or hemoptysis. She denies abdominal distention or bloating, early satiety, anorexia/change in appetite, unintentional weight loss. She does not lower extremity edema. She denies exertional lightheadedness. She denies palpitations, syncope or near syncope. Review of systems is negative for chest pain, pressure, discomfort. When ambulating on an incline, he/she reports/denies leg claudication. History is negative for neurological symptoms including transient loss of vision, asymmetric weakness, aphasia, dysphasia, numbness, tingling.            Patient Active Problem List    Diagnosis Date Noted    New onset of congestive heart failure ([facility]     Hypokalemia     Admitted for acute congestive heart failure ([facility] 01/13/2020    Hyperglycemia 01/13/2020    Pleural effusion on right 01/13/2020    Elevated blood pressure reading without diagnosis of hypertension 01/13/2020         Past Medical History:   Diagnosis Date    CHF (congestive heart failure) (HCC)     HFrEF (heart failure with reduced ejection fraction) ([facility] 01/17/2020 1/14/20- echo- LVEF 15%, stage I DD, RVSF reduced, LA severely dilated, LBBB, severe MR, mild TR, :LVDD: 6.5, RVDD: 3.0         Past Surgical History:   Procedure Laterality Date    CARDIAC CATHETERIZATION  01/16/2020    DR Lauren Fair         No Known Allergies        Outpatient Medications Marked as Taking for the 1/28/20 encounter (Office Visit) with Ellyn Gottron, MD   Medication Sig Dispense Refill    metoprolol succinate (TOPROL XL) 50 MG extended release tablet Take 1 tablet by mouth 2 times daily 60 tablet 0    sacubitril-valsartan (ENTRESTO) 24-26 MG per tablet Take 1 tablet by mouth 2 times daily 60 tablet 0    bumetanide (BUMEX) 1 MG tablet Take 1 tablet by mouth 2 times daily 60 tablet 0    spironolactone (ALDACTONE) 25 MG tablet Take 1 tablet by mouth daily 30 tablet 0    pantoprazole sodium (PROTONIX) 40 MG PACK packet Take 1 packet by mouth every morning (before breakfast) 30 packet 0         Review of Systems:   Cardiac: As per HPI  General: No fever, chills, rigors  Pulmonary: As per HPI  HEENT: No visual disturbances, difficult swallowing  GI: No nausea, vomiting, abdominal pain  : No dysuria or hematuria  Endocrine: No thyroid disease or diabetes  Musculoskeletal: VELEZ x 4, no focal motor deficits  Skin: Intact, no rashes  Neuro/Psych: No headache or seizures      Weights: Wt Readings from Last 3 Encounters:   01/28/20 117 lb 9.6 oz (53.3 kg)   01/21/20 114 lb 4.8 oz (51.8 kg)     Physical Examination:     BP 98/60   Pulse 67   Resp 18   Ht 5' 4\" (1.626 m)   Wt 117 lb 9.6 oz (53.3 kg)   SpO2 98%   BMI 20.19 kg/m²     CONSTITUTIONAL: Alert and oriented times 3, no acute distress and cooperative to examination with proper mood and affect. SKIN: Skin color, texture, turgor normal. No rashes or lesions. LYMPH: no cervical nodes, no inguinal nodes  HEENT: Head is normocephalic, atraumatic. EOMI, PERRLA. NECK: no JVD  CHEST/LUNGS: chest symmetric with normal A/P diameter, normal respiratory rate and rhythm, lungs clear to auscultation without wheezes, rales or rhonchi. No accessory muscle use.  Life branch, an RV marginal branch, then a PDA and PLV branch.  No angiographic stenosis noted. Left ventriculogram revealed significantly dilated left ventricle with an ejection fraction of 10% to 15%.  There was severe mitral  regurgitation noted. IMPRESSION:  1.  Absence of angiographic coronary stenosis. 2.  Significantly elevated left ventricular end-diastolic pressure at 37 mmHg. 3.  Dilated left ventricle with an ejection fraction of 10% to 15%. 4.  Severe mitral regurgitation.         ECG: NSR, LVH, repolarization abnormalities, LBBB, RAD      6 minute walk testing 1/28/2020:  1600 feet  No desaturations  Appropriate HR response, poor HR recovery  Normal BP response  Niurka score at peak was 2      Guideline directed medical/device therapy:  ARNI/ACE I/ARB: Yes  Beta blocker: Yes  Aldosterone antagonist:  Yes  ICD/CRT-P/-D:  Wearable cardioverter defibrillator  QRS interval on recent ECG (personally reviewed/interpreted): >150 ms  Percentage RV pacing (personally reviewed/interpreted): %/NA         ASSESSMENT:  1. Chronic HFrEF,index hospitalization was January 2020  2. ACC stage C / NYHA class II, 1600 feet on 6 minute walk testing, niurka 2  3. Nonischemic cardiomyopathy   4. LVEF 15%, LVEDD 65, LVMI 144  5. Severe secondary MR (ERO 0.3 cm2)  6. RV dysfunction ( TAPSE 1.4)  7. Moderate TR  8. LBBB,  ms        PLAN:  1. Increase Entresto to 49-51 mg twice daily      2. Decrease bumetanide from twice daily to once daily only      3. Repeat blood work in 2 weeks      4. If the blood work looks ok, we will increase the Entresto to  mg twice daily, and decrease the bumetanide to every other day      5. Diet should sodium restricted to 2-4 grams a day. Again watch your daily weight trends and if you gain water weight please follow below instructions.     If you gain 3-5 pounds in 2-3 days OR notice that you are retaining fluid in anyway just like you did before then take an extra dose of your water pill

## 2020-02-05 ENCOUNTER — TELEPHONE (OUTPATIENT)
Dept: CARDIOLOGY CLINIC | Age: 60
End: 2020-02-05

## 2020-02-24 ENCOUNTER — TELEPHONE (OUTPATIENT)
Dept: CARDIOLOGY CLINIC | Age: 60
End: 2020-02-24

## 2020-02-24 NOTE — TELEPHONE ENCOUNTER
Meet Weiss called office,   Wants to make sure there is no change for nurse visit tomorrow. Sent message to Paolo Nino to review. copays are $100 per visit for provider. Dr Carlos Andrade set a nurse visit so there would be no change. Meet Weiss was verifying this to be true. Suad Mcintyre , registrar will need to see how billing works for nurse visit.    skype message sent to Suad Mcintyre.

## 2020-02-25 ENCOUNTER — NURSE ONLY (OUTPATIENT)
Dept: CARDIOLOGY CLINIC | Age: 60
End: 2020-02-25
Payer: COMMERCIAL

## 2020-02-25 VITALS
HEART RATE: 76 BPM | WEIGHT: 117.6 LBS | RESPIRATION RATE: 14 BRPM | HEIGHT: 64 IN | BODY MASS INDEX: 20.08 KG/M2 | SYSTOLIC BLOOD PRESSURE: 82 MMHG | DIASTOLIC BLOOD PRESSURE: 50 MMHG | OXYGEN SATURATION: 93 %

## 2020-02-25 PROCEDURE — 93000 ELECTROCARDIOGRAM COMPLETE: CPT | Performed by: INTERNAL MEDICINE

## 2020-02-25 RX ORDER — MULTIVIT WITH MINERALS/LUTEIN
1000 TABLET ORAL DAILY
COMMUNITY
End: 2020-07-27 | Stop reason: CLARIF

## 2020-02-25 RX ORDER — METOPROLOL SUCCINATE 50 MG/1
50 TABLET, EXTENDED RELEASE ORAL 2 TIMES DAILY WITH MEALS
COMMUNITY
End: 2020-02-25 | Stop reason: DRUGHIGH

## 2020-02-25 RX ORDER — METOPROLOL SUCCINATE 50 MG/1
50 TABLET, EXTENDED RELEASE ORAL DAILY
Qty: 90 TABLET | Refills: 1 | Status: SHIPPED
Start: 2020-02-25 | End: 2020-07-10 | Stop reason: SDUPTHER

## 2020-02-25 RX ORDER — BUMETANIDE 1 MG/1
1 TABLET ORAL DAILY PRN
Qty: 90 TABLET | Refills: 3 | Status: SHIPPED
Start: 2020-02-25 | End: 2020-07-10 | Stop reason: SDUPTHER

## 2020-02-25 RX ORDER — PANTOPRAZOLE SODIUM 40 MG/1
40 TABLET, DELAYED RELEASE ORAL EVERY MORNING
COMMUNITY

## 2020-02-25 RX ORDER — BUMETANIDE 1 MG/1
1 TABLET ORAL SEE ADMIN INSTRUCTIONS
Qty: 90 TABLET | Refills: 3 | Status: SHIPPED | OUTPATIENT
Start: 2020-02-25 | End: 2020-02-25

## 2020-02-25 RX ORDER — SPIRONOLACTONE 25 MG/1
25 TABLET ORAL DAILY
Qty: 90 TABLET | Refills: 1 | Status: SHIPPED
Start: 2020-02-25 | End: 2020-07-10 | Stop reason: SDUPTHER

## 2020-02-25 SDOH — HEALTH STABILITY: PHYSICAL HEALTH: ON AVERAGE, HOW MANY DAYS PER WEEK DO YOU ENGAGE IN MODERATE TO STRENUOUS EXERCISE (LIKE A BRISK WALK)?: 2 DAYS

## 2020-02-25 SDOH — SOCIAL STABILITY: SOCIAL INSECURITY: WITHIN THE LAST YEAR, HAVE YOU BEEN AFRAID OF YOUR PARTNER OR EX-PARTNER?: NO

## 2020-02-25 SDOH — HEALTH STABILITY: MENTAL HEALTH
STRESS IS WHEN SOMEONE FEELS TENSE, NERVOUS, ANXIOUS, OR CAN'T SLEEP AT NIGHT BECAUSE THEIR MIND IS TROUBLED. HOW STRESSED ARE YOU?: NOT AT ALL

## 2020-02-25 SDOH — SOCIAL STABILITY: SOCIAL INSECURITY
WITHIN THE LAST YEAR, HAVE TO BEEN RAPED OR FORCED TO HAVE ANY KIND OF SEXUAL ACTIVITY BY YOUR PARTNER OR EX-PARTNER?: NO

## 2020-02-25 SDOH — SOCIAL STABILITY: SOCIAL INSECURITY: WITHIN THE LAST YEAR, HAVE YOU BEEN HUMILIATED OR EMOTIONALLY ABUSED IN OTHER WAYS BY YOUR PARTNER OR EX-PARTNER?: NO

## 2020-02-25 SDOH — HEALTH STABILITY: PHYSICAL HEALTH: ON AVERAGE, HOW MANY MINUTES DO YOU ENGAGE IN EXERCISE AT THIS LEVEL?: 30 MIN

## 2020-02-25 SDOH — SOCIAL STABILITY: SOCIAL NETWORK: ARE YOU MARRIED, WIDOWED, DIVORCED, SEPARATED, NEVER MARRIED, OR LIVING WITH A PARTNER?: NEVER MARRIED

## 2020-02-25 SDOH — SOCIAL STABILITY: SOCIAL INSECURITY
WITHIN THE LAST YEAR, HAVE YOU BEEN KICKED, HIT, SLAPPED, OR OTHERWISE PHYSICALLY HURT BY YOUR PARTNER OR EX-PARTNER?: NO

## 2020-02-25 SDOH — SOCIAL STABILITY: SOCIAL NETWORK
DO YOU BELONG TO ANY CLUBS OR ORGANIZATIONS SUCH AS CHURCH GROUPS UNIONS, FRATERNAL OR ATHLETIC GROUPS, OR SCHOOL GROUPS?: YES

## 2020-02-25 SDOH — ECONOMIC STABILITY: TRANSPORTATION INSECURITY
IN THE PAST 12 MONTHS, HAS THE LACK OF TRANSPORTATION KEPT YOU FROM MEDICAL APPOINTMENTS OR FROM GETTING MEDICATIONS?: NO

## 2020-02-25 SDOH — ECONOMIC STABILITY: TRANSPORTATION INSECURITY
IN THE PAST 12 MONTHS, HAS LACK OF TRANSPORTATION KEPT YOU FROM MEETINGS, WORK, OR FROM GETTING THINGS NEEDED FOR DAILY LIVING?: NO

## 2020-02-25 SDOH — SOCIAL STABILITY: SOCIAL NETWORK: IN A TYPICAL WEEK, HOW MANY TIMES DO YOU TALK ON THE PHONE WITH FAMILY, FRIENDS, OR NEIGHBORS?: ONCE A WEEK

## 2020-02-25 SDOH — SOCIAL STABILITY: SOCIAL NETWORK: HOW OFTEN DO YOU ATTEND CHURCH OR RELIGIOUS SERVICES?: MORE THAN 4 TIMES PER YEAR

## 2020-02-25 SDOH — ECONOMIC STABILITY: FOOD INSECURITY: WITHIN THE PAST 12 MONTHS, THE FOOD YOU BOUGHT JUST DIDN'T LAST AND YOU DIDN'T HAVE MONEY TO GET MORE.: NEVER TRUE

## 2020-02-25 SDOH — SOCIAL STABILITY: SOCIAL NETWORK: HOW OFTEN DO YOU ATTENT MEETINGS OF THE CLUB OR ORGANIZATION YOU BELONG TO?: MORE THAN 4 TIMES PER YEAR

## 2020-02-25 SDOH — SOCIAL STABILITY: SOCIAL NETWORK: HOW OFTEN DO YOU GET TOGETHER WITH FRIENDS OR RELATIVES?: TWICE A WEEK

## 2020-02-25 SDOH — ECONOMIC STABILITY: INCOME INSECURITY: HOW HARD IS IT FOR YOU TO PAY FOR THE VERY BASICS LIKE FOOD, HOUSING, MEDICAL CARE, AND HEATING?: NOT HARD AT ALL

## 2020-02-25 SDOH — ECONOMIC STABILITY: FOOD INSECURITY: WITHIN THE PAST 12 MONTHS, YOU WORRIED THAT YOUR FOOD WOULD RUN OUT BEFORE YOU GOT MONEY TO BUY MORE.: NEVER TRUE

## 2020-02-25 NOTE — PATIENT INSTRUCTIONS
Micardis, Teveten, and others). You should not take sacubitril and valsartan within 36 hours before or after you have taken any ACE inhibitor medication. If you have diabetes, do not use sacubitril and valsartan together with any medication that contains aliskiren (a blood pressure medicine). You may also need to avoid taking sacubitril and valsartan with aliskiren if you have kidney disease. Tell your doctor if you have ever had:  · liver disease;  · hereditary angioedema; or  · if you are on a low-salt-diet. Do not use if you are pregnant, and tell your doctor right away if you become pregnant. Sacubitril and valsartan can cause injury or death to the unborn baby if you take the medicine during your second or third trimester. You should not breast-feed while using this medicine. How should I take sacubitril and valsartan? Follow all directions on your prescription label and read all medication guides or instruction sheets. Your doctor may occasionally change your dose. Use the medicine exactly as directed. You may take this medicine with or without food. Your blood pressure will need to be checked often. Your kidney function may also need to be checked. Store at room temperature away from moisture and heat. What happens if I miss a dose? Take the medicine as soon as you can, but skip the missed dose if it is almost time for your next dose. Do not take two doses at one time. What happens if I overdose? Seek emergency medical attention or call the Poison Help line at 1-842.365.3123. What should I avoid while taking sacubitril and valsartan? Do not use potassium supplements or salt substitutes, unless your doctor has told you to. Avoid getting up too fast from a sitting or lying position, or you may feel dizzy. What are the possible side effects of sacubitril and valsartan?   Get emergency medical help if you have signs of an allergic reaction: hives; difficulty breathing; swelling of your face, ankles, and other problems. By taking medicines regularly, reducing sodium (salt) in your diet, checking your weight every day, and making lifestyle changes, you can feel better and live longer. Follow-up care is a key part of your treatment and safety. Be sure to make and go to all appointments, and call your doctor if you are having problems. It's also a good idea to know your test results and keep a list of the medicines you take. How can you care for yourself at home? Medicines    · Be safe with medicines. Take your medicines exactly as prescribed. Call your doctor if you think you are having a problem with your medicine.     · Do not take any vitamins, over-the-counter medicine, or herbal products without talking to your doctor first. Isiah Mcelroy not take ibuprofen (Advil or Motrin) and naproxen (Aleve) without talking to your doctor first. They could make your heart failure worse.     · You may take some of the following medicine. ? Angiotensin-converting enzyme inhibitors (ACEIs) or angiotensin II receptor blockers (ARBs) reduce the heart's workload, lower blood pressure, and reduce swelling. Taking an ACEI or ARB may lower your chance of needing to be hospitalized. ? Beta-blockers can slow heart rate, decrease blood pressure, and improve your condition. Taking a beta-blocker may lower your chance of needing to be hospitalized. ? Diuretics, also called water pills, reduce swelling.    You will get more details on the specific medicines your doctor prescribes. Diet    · Your doctor may suggest that you limit sodium. Your doctor can tell you how much sodium is right for you. An example is less than 3,000 mg a day. This includes all the salt you eat in cooking or in packaged foods. People get most of their sodium from processed foods. Fast food and restaurant meals also tend to be very high in sodium.     · Ask your doctor how much liquid you can drink each day.  You may have to limit liquids.    Weight    · Weigh yourself without clothing at the same time each day. Record your weight. Call your doctor if you have a sudden weight gain, such as more than 2 to 3 pounds in a day or 5 pounds in a week. (Your doctor may suggest a different range of weight gain.) A sudden weight gain may mean that your heart failure is getting worse.    Activity level    · Start light exercise (if your doctor says it is okay). Even if you can only do a small amount, exercise will help you get stronger, have more energy, and manage your weight and your stress. Walking is an easy way to get exercise. Start out by walking a little more than you did before. Bit by bit, increase the amount you walk.     · When you exercise, watch for signs that your heart is working too hard. You are pushing yourself too hard if you cannot talk while you are exercising. If you become short of breath or dizzy or have chest pain, stop, sit down, and rest.     · If you feel \"wiped out\" the day after you exercise, walk slower or for a shorter distance until you can work up to a better pace.     · Get enough rest at night. Sleeping with 1 or 2 pillows under your upper body and head may help you breathe easier.    Lifestyle changes    · Do not smoke. Smoking can make a heart condition worse. If you need help quitting, talk to your doctor about stop-smoking programs and medicines. These can increase your chances of quitting for good. Quitting smoking may be the most important step you can take to protect your heart.     · Limit alcohol to 2 drinks a day for men and 1 drink a day for women. Too much alcohol can cause health problems.     · Avoid getting sick from colds and the flu. Get a pneumococcal vaccine shot. If you have had one before, ask your doctor whether you need another dose. Get a flu shot each year. If you must be around people with colds or the flu, wash your hands often. When should you call for help?   Call 911 if you have symptoms of sudden heart failure such as:    · You have severe trouble breathing.     · You cough up pink, foamy mucus.     · You have a new irregular or rapid heartbeat.    Call your doctor now or seek immediate medical care if:    · You have new or increased shortness of breath.     · You are dizzy or lightheaded, or you feel like you may faint.     · You have sudden weight gain, such as more than 2 to 3 pounds in a day or 5 pounds in a week. (Your doctor may suggest a different range of weight gain.)     · You have increased swelling in your legs, ankles, or feet.     · You are suddenly so tired or weak that you cannot do your usual activities.    Watch closely for changes in your health, and be sure to contact your doctor if you develop new symptoms. Where can you learn more? Go to https://Off & Awaypemistieweb.RegaloCard. org and sign in to your [employer] account. Enter G529 in the Ali box to learn more about \"Heart Failure: Care Instructions. \"     If you do not have an account, please click on the \"Sign Up Now\" link. Current as of: April 9, 2019  Content Version: 12.3  © 4612-0235 [employer]. Care instructions adapted under license by [facility] ([facility]). If you have questions about a medical condition or this instruction, always ask your healthcare professional. [address] any warranty or liability for your use of this information. How to Read a Food Label to Limit Sodium: Care Instructions  Your Care Instructions  Sodium causes your body to hold on to extra water. This can raise your blood pressure and force your heart and kidneys to work harder. In very serious cases, this could cause you to be put in the hospital. It might even be life-threatening. By limiting sodium, you will feel better and lower your risk of serious problems. Processed foods, fast food, and restaurant foods are the major sources of dietary sodium. The most common name for sodium is salt.  Try to limit how much sodium you eat to less than 2,300 milligrams (mg) a day. If you limit your sodium to 1,500 mg a day, you can lower your blood pressure even more. This limit counts all the salt that you eat in foods you cook or in packaged foods. Keep a list of everything you eat and drink. Follow-up care is a key part of your treatment and safety. Be sure to make and go to all appointments, and call your doctor if you are having problems. It's also a good idea to know your test results and keep a list of the medicines you take. How can you care for yourself at home? Read ingredient lists on food labels  · Read the list of ingredients on food labels to help you find how much sodium is in a food. The label lists the ingredients in a food in descending order (from the most to the least). If salt or sodium is high on the list, there may be a lot of sodium in the food. · Know that sodium has different names. Sodium is also called monosodium glutamate (MSG, common in [facility] food), sodium citrate, sodium alginate, sodium hydroxide, and sodium phosphate. Read Nutrition Facts labels  · On most foods, there is a Nutrition Facts label. This will tell you how much sodium is in one serving of food. Look at both the serving size and the sodium amount. The serving size is located at the top of the label, usually right under the \"Nutrition Facts\" title. The amount of sodium is given in the list under the title. It is given in milligrams (mg). ? Check the serving size carefully. A single serving is often very small, and you may eat more than one serving. If this is the case, you will eat more sodium than listed on the label. For example, if the serving size for a canned soup is 1 cup and the sodium amount is 470 mg, if you have 2 cups you will eat 940 mg of sodium. · The nutrition facts for fresh fruits and vegetables are not listed on the food. They may be listed somewhere in the store. These foods usually have no sodium or low sodium.   · The Nutrition Facts label also gives you the Percent Daily Value for sodium. This is how much of the recommended amount of sodium a serving contains. The daily value for sodium is less than 2,300 mg. So if the Percent Daily Value says 50%, this means one serving is giving you half of this, or 1,150 mg. Buy low-sodium foods  · Look for foods that are made with less sodium. Watch for the following words on the label. ? \"Unsalted\" means there is no sodium added to the food. But there may be sodium already in the food naturally. ? \"Sodium-free\" means a serving has less than 5 mg of sodium. ? \"Very low sodium\" means a serving has 35 mg or less of sodium. ? \"Low-sodium\" means a serving has 140 mg or less of sodium. · \"Reduced-sodium\" means that there is 25% less sodium than what the food normally has. This is still usually too much sodium. Try not to buy foods with this on the label. · Buy fresh vegetables, or frozen vegetables without added sauces. Buy low-sodium versions of canned vegetables, soups, and other canned goods. Where can you learn more? Go to https://M Squared LaserspepicewEpisencial.JungleCents. org and sign in to your [employer] account. Enter 26 [id number] in the [facility] box to learn more about \"How to Read a Food Label to Limit Sodium: Care Instructions. \"     If you do not have an account, please click on the \"Sign Up Now\" link. Current as of: August 21, 2019  Content Version: 12.3  © 2391-7782 Health[employer]. Care instructions adapted under license by [facility] ([facility]). If you have questions about a medical condition or this instruction, always ask your healthcare professional. [place] [address] any warranty or liability for your use of this information. Patient Education        Learning About a Wearable Cardioverter-Defibrillator (WCD)  What is it? A wearable cardioverter-defibrillator (WCD) is a vest that has a defibrillator built into it.  A defibrillator is a device that fixes serious changes in your heartbeat. The device is always checking your heart rate and rhythm. If it detects a life-threatening, rapid heart rhythm, it sends an electric shock to the heart. This can restore a normal rhythm. A WCD helps control abnormal heart rhythms. You aren't likely to get a shock from the [address], just as you aren't likely to use the air bag on a car. But it can save your life if it's needed. Why is a WCD used? You might get a WCD if you can't have an ICD (implantable cardioverter-defibrillator) or while you are waiting to have one put in. A WCD may also be used if you might have a high risk of sudden cardiac death for a short time. How is it used? You may wear the WCD for about 2 to 6 weeks or longer. You will be measured so your vest will be the right size. The vest is worn under your clothes. It has electrodes and wires that lie against your skin. You wear a monitor around your waist or on a strap over your shoulder. The WCD should be worn all the time except when you bathe. You can do your normal activities while you wear it. Your doctor will show you how the [address] works and how to take care of it. Be sure to pay attention to alert sounds and messages on the monitor. You need to follow the monitor's instructions exactly. Merline Gomess also get instructions for what to do after a shock. What does it feel like? The vest may feel uncomfortable, especially at first when you're not used to it. The shock delivered by the defibrillator may be painful. Follow-up care is a key part of your treatment and safety. Be sure to make and go to all appointments, and call your doctor if you are having problems. It's also a good idea to know your test results and keep a list of the medicines you take. Where can you learn more? Go to https://Dstillery (formerly Media6Degrees)anderson.SoftWriters Holdings. org and sign in to your [employer] account.  Enter W135 in the [employer] box to learn more about \"Learning About a Wearable Cardioverter-Defibrillator (WCD). \"     If you do not have an account, please click on the \"Sign Up Now\" link. Current as of: April 9, 2019  Content Version: 12.3  © 1746-0550 Healthwise, Incorporated. Care instructions adapted under license by [facility] ([facility]). If you have questions about a medical condition or this instruction, always ask your healthcare professional. [address] any warranty or liability for your use of this information.

## 2020-02-25 NOTE — PROGRESS NOTES
Per DR Crystal Sidhu nurse visit 2/25/2020 for [facility] 1960 [phone] (home)  [address] [facility] [address]     Ambulatory 64 y.o. female,     NURSE Visit: bp/hr/ekg if hr >65  Call dr Crystal Sidhu for medication changes. Was directed 2-6-2020 to [employer] assistance program for entresto financial assistance. Pending approval  Also providing samples till gets funding help at that time. Active mid dose entresto PA. Had decreased bumex to daily as instructed   She thought she was told to take spironolactone 25mg twice daily. Reviewed med with DR Crystal Sidhu, please only take spironolactone 25mg daily. Labs:  Not sent [facility] side where labs were drawn Friday. No labs available to review at this time. At times lightheaded. Varies when gets dizziness. She will hydrate when ligheaded. episodes Lasts about 30 min. Usually when up and about. All of a sudden sees spots and then sits down for 30 min. She has not been keeping hydrated. Will  to keep hydrated. She though she was on a fluid restriction. Discussed minimum to drink is 8 cups of fluid daily . Coffee 1 every morning, and tea drinker 2 cups daily. Denies energy drinks. She will inc fluid intake to minimum intake of 8 cups. When dizzy will drink another 1 -2 cups of water. Urine color: not dark color, yellow urine. More lemonade. Headache at times. Takes tylenol daily. Takes tylenol preventively daily in am.  Headache started when started her cardiac meds and reduced her fluid intake. Stopped wine use when heart problem developed. No tobacco or drug use. Has had her flu vaccine. Using [facility]. 3 month supply is less expensive. brought  All meds in today. Will review meds and educate. Walks in mall 2 -3 days a week. For 30 min sessions. Difficulty swallowing pills . Cuts the pills she can (that are scored) likes  To get liquid or powders. Uses yogurt or applesauce.  Talks to pharmacist regarding if meds are crushable or dissolvable. Med list updated per bottles and patient stated dosing. Was taking aldactone 50mg daily instead of 25mg daily. Metoprolol succinate was instructed 50mg twice daily. But pcp had ordered daily 90 day script. She has been taking it 50mg twice daily as directed. Ht 5' 4\" (1.626 m)   BMI 20.19 kg/m²    80/60 bp  sitting , given 1 cups of water. Repeat bp is 88/50 laying  , 80/50 sitting and  82/50 standing. No dizziness with position change. Another 1 cup fluid water given. Had meds about 715am today. External lifevest removed for ekg then replaced. Lungs clear, abd soft non-distended. No leg edema. Skin warm and dry. Regular cardiac rhythm. Went to [facility] for labs on Friday. I Called [facility]    [phone]  [facility]   [phone] is fax include:  Name  and what I need. (consent release signed by patient and faxed request for urgent lab results )       DR Iveth Garcia reviewed assessment and vitals  Pending labs to be sent from [facility]:   Plan:  1. Stop daily bumetanide use. (water pill)  Only take a water pill  1mg bumex on days you gain more than 3# in a day, 5 # in a week or have swelling/edema, worse breathing (shortness of breath)    2. Spironolactone is to be only 25mg daily. Script sent to [facility]    3. Reduce metprolol succinate to 50mg daily  (heart rate 50 on ekg today)    4. obtain labs from [facility] (release Gwenette Sanes health information form completed and sent) faxed [phone]     5. No fluid restriction. Stay hydrated, that is drink at least 8 cups fluid daily. Try hydrating with 8 ounces or 2 cups fluid in morning. You may not need the tylenol for headache then. 6. Continue to wear life vest    7. Continue entresto 49-51mg twice daily.   Will reassess you with a nurse visit in 1-2 weeks, our intent is to increase the entresto to 97-103mg twice daily if you blood pressure and heart rate are good and you feel ok. (no dizziness/ lightheadedness). 8. Keep a log of daily blood pressures, weights. Call Cassandradamon Yolanda next week with this log information. [id number] [employer]. Keep up the good job. 1 hr 15 min in education session today. Has no questions. Ambulatory discharge to self. Has office # if any questions prior to next nurse visit.

## 2020-03-02 ENCOUNTER — TELEPHONE (OUTPATIENT)
Dept: CARDIOLOGY CLINIC | Age: 60
End: 2020-03-02

## 2020-03-05 ENCOUNTER — NURSE ONLY (OUTPATIENT)
Dept: CARDIOLOGY CLINIC | Age: 60
End: 2020-03-05

## 2020-03-05 VITALS
BODY MASS INDEX: 20.22 KG/M2 | SYSTOLIC BLOOD PRESSURE: 104 MMHG | DIASTOLIC BLOOD PRESSURE: 60 MMHG | HEIGHT: 64 IN | WEIGHT: 118.4 LBS | HEART RATE: 84 BPM | OXYGEN SATURATION: 98 % | RESPIRATION RATE: 20 BRPM

## 2020-03-05 NOTE — PATIENT INSTRUCTIONS
Plan:   Per DR Sanabria:  1. increase Entresto 97-103mg twice daily    2. Repeat blood work in 1-2 weeks  BMP/BNP  (do labs 1-3 days prior to next nurse visit)  Have labs faxed to [phone]    3. Continue to keep hydrated that is drink at least 8 cups of fluid daily, if your blood pressure is below 90 or you are dizzy drink an extra 1 or 2 cups of liquid. 4. Continue to monitor your blood pressures and weights. 5. Intent to increase your metoprolol succinate at next office visit if your heart rate and blood pressure are good and you feel well. That is no dizziness or lightheadedness. 6. Continue cardiac diet    7. Continue to wear your lifevest           /60 (Site: Left Upper Arm, Position: Sitting, Cuff Size: Medium Adult)   Pulse 84 Comment: radial  Resp 20   Ht 5' 4\" (1.626 m)   Wt 118 lb 6.4 oz (53.7 kg)   SpO2 98%   BMI 20.32 kg/m²   My goal for self-management of heart failure includes 5 steps :  1. Recognizing a change in symptoms ( weight gain, short of breath, leg swelling, decreased  Activity level, bloating. ...)  2. Evaluate the change (use the heart failure zones )   3. Decide to take action:Decide what your options are, such as: (Call Heart Failure clinic for extra visit, take a prescribed medication, such as your water pill if your doctor has given you directions to do so)  4. implement a strategy:(now you Call the CHF Clinic or your physician for advice/appointment. This is where you take action!!! Do not wait, catch the symptom early and treat it before it becomes worse. 5. Evaluate the response: The next day, check your heart failure zones: are you in the green zone? Worsening symptoms of yellow zones? Or have you moved to the red zone and need to call 911 to go to the emergency room for evaluation?  I will phone CHF clinic and my dr office to update them on how my symptoms of heart failure   Heart Failure Patient Information Exercise Guide Sheet:  Physical Activity: Shortness of breath and tiredness are common symptoms for people with chronic heart failure. Many people find that they are unable to do some activities that they used to enjoy. Over time, inactivity leads to a loss of fitness and strength. Regular exercise performed in the correct manner can help to reverse some of these negative changes. Exercise improves your strength and stamina. Your doctor will discuss an exercise program that meets your needs. In some cases a cardiac rehabilitation program and/ or physical therapy to learn basic conditioning and stretching exercises may be helpful. Exercise is recommended for most people who have heart failure it can help:  · Improve flow of blood though your body,Strengthen your heart and body,Tone your muscle,Increase your energy level,Manage stress,Raise your spirits and feel good about yourself, Lower blood pressure, Increase HDL \"good cholesterol in your blood\", and Control blood sugars. · Allows you to exercise longer before feeling breathless, Conserve energy: pace yourself. Balance activity with rest.  · Improves your quality of life  · Potentially decreases the number of times you need to be admitted to the hospital and improves your life expectancy  Ask your doctor:  · When you will be able to return to work. · How much you are aloud to lift. Avoid heavy physical work or lifting. · Do not drive unless your doctor has given you permission to do so. · If you have had a recent heart attack or other cardiac event or a cardiac procedure, it may take you a while to reach your physical activity goal.  Your physician will tell you when and what type of exercise to start. · Monitored exercise prescription (cardiac rehabilitation or physical therapy)          Walking is one type of exercise that may be recommended. If you are able to get out to exercise, you may do simple stretching and strengthening exercises at home.   Every exercise period should begin with a warm up activity and end with a cool down activity of five to ten minutes. You can warm up and cool down by walking or biking slowly for several minutes. Slow, gentle stretching exercises also may be suggested. Set personal goals. Make exercise a part of your daily routine, like brushing your teeth and showering. The initial exercise goal allows you to improve your fitness level without much effort. It includes only aerobic activity, not stretching or strengthening exercises. It works well for people who are just starting physical activity program and don't have the energy or stamina to do more. The activity progression stage includes all 3 types of physical activity. It takes a bit more time and commitment and offers greater health benefits. It may take you a while to reach your activity goal.  Here are guidelines to start a walking program, but make sure you discuss your plans with your doctor before you begin:  · Week 1-2 Walk 5 to 10 minutes  · Week 3-4 Walk 10 to 15 minutes  · Week 5-6 Walk 15 to 20 minutes  · Week 7-8 Walk 20 to 30 minutes      Self-monitoring how hard your body is working can help you adjust the intensity of the activity by speeding up or slowing down your movements. How hard should I push myself? You need to do a certain amount of activity to have a benefit; however pushing yourself too hard can be dangerous. There are 2 ways of knowing if you are doing the right amount of exercise. 1) The talk test   You should always be able to walk and talk at the same time. If you are so breathless that you cant speak a full sentence, you are doing too much. 2) Rating your perceived exertion (RPE) or Bryce Scale   While doing physical activity, we want you to rate your perception of exertion. This feeling should reflect how heavy and strenuous the exercise feels to you, combining all sensations and feelings of physical stress, effort, and fatigue.  Do not concern yourself with any one factor such as leg pain or shortness of breath, but try to focus on your total feeling of exertion. Use the perceived exertion scale to monitor your intensity. Look at the rating scale below while you are engaging in an activity; it ranges from 6 to 20, where 6 means \"no exertion at all\" and 20 means \"maximal exertion. \" Choose the number from below that best describes your level of exertion. This will give you a good idea of the intensity level of your activity, and you can use this information to speed up or slow down your movements to reach your desired range. Try to rate your feeling of exertion as honestly as possible, without thinking about what the actual physical load is. Your own feeling of effort and exertion is important, not how it compares to other people's. Look at the scales and the expressions and then give a number. 6 No exertion at all  7  Extremely light (7.5)  8  9 Very light  10  11 Light  12  13 Somewhat hard Whatever the activity, you should aim to work between 9 and 13 on this    scale. If the activity is hard then you are doing too much and should have    a rest.     14  15 Hard (heavy)  16  17 Very hard  18  19 Extremely hard  20 Maximal exertion    9 corresponds to [employer] light\" exercise. For a healthy person, it is like walking slowly at his or her own pace for some minutes  13 on the scale is \"somewhat hard\" exercise, but it still feels OK to continue. 17 \"very hard\" is very strenuous. A healthy person can still go on, but he or she really has to push him- or herself. It feels very heavy, and the person is very tired. 19 on the scale is an extremely strenuous exercise level. For most people this is the most strenuous exercise they have ever experienced.       Watch for warning signs of too much physical activity which may include:  Angina(squeezing, burning,pressure,heaviness or tightness to chest    which may spread to arm,shoulder,back,throat or jaw  Feeling lightheaded dizzy or confused  Feeling extreme tired after physical activity  Unusual or extreme short of breath  Fast or uneven heart beats. If you notice these signs during or after physical activity, stop and call your doctor right away. If you have angina that doesn't go away immediately when you rest, take nitroglycerine if you have it. If the angina continues for more than 5 minutes or you also have other symptoms such as nausea and sweating call 9-1-1. You may be having a heart attack. If you have pain or cramping in your calves,theighs and buttocks during physical activity that goes away at rest, talk to your doctor about it. Strength exercises keep other muscles of the body in good shape and when your doctor tells you to start you will start to exercise the major muscle groups 2-3 days a week. Stretching exercises keep muscles flexible. When told to start , you will do 1-2 sets of 6-8 repetitions of 8-10 exercises  With low weights 2 days a week. The goal will be 2 sets of 10-15 repetitions of 8-10 exercises with increased weight, 2-3 days a week. If you do not have  30 minutes a day you can do two 15-minute, or three 10-minute workouts a day and get the same benefits. Activity goal: 30 to 60minuts of aerobic physical activity daily, plus more stretching and strengthening. Stop any exercise that causes pain or any other symptom such as dizziness, palpitations,or excessive shortness of breath. If these symptoms occur, notify your physician immediately. Walking for Exercise: Care Instructions  Your Care Instructions    Walking is one of the easiest ways to get the exercise you need for good health. A brisk, 30-minute walk each day can help you feel better and have more energy. It can help you lower your risk of disease. Walking can help you keep your bones strong and your heart healthy.     Check with your doctor before you start a walking plan if you have heart problems, other health issues, or you have not been active in a long time. Follow your doctor's instructions for safe levels of exercise. Follow-up care is a key part of your treatment and safety. Be sure to make and go to all appointments, and call your doctor if you are having problems. It's also a good idea to know your test results and keep a list of the medicines you take. How can you care for yourself at home? Getting started  · Start slowly and set a short-term goal. For example, walk for 5 or 10 minutes every day. · Bit by bit, increase the amount you walk every day. Try for at least 30 minutes on most days of the week. You also may want to swim, bike, or do other activities. · If finding enough time is a problem, it is fine to be active in blocks of 10 minutes or more throughout your day and week. · To get the heart-healthy benefits of walking, you need to walk briskly enough to increase your heart rate and breathing, but not so fast that you cannot talk comfortably. · Wear comfortable shoes that fit well and provide good support for your feet and ankles. Staying with your plan  · After you've made walking a habit, set a longer-term goal. You may want to set a goal of walking briskly for longer or walking farther. Experts say to do 2½ hours of moderate activity a week. A faster heartbeat is what defines moderate-level activity. · To stay motivated, walk with friends, coworkers, or pets. · Use a phone tremayne or pedometer to track your steps each day. Set a goal to increase your steps. Once you get there, set a higher goal. Aim for 10,000 steps a day. · If the weather keeps you from walking outside, go for walks at the mall with a friend. Local schools and churches may have indoor gyms where you can walk. Fitting a walk into your workday  · Park several blocks away from work, or get off the bus a few stops early. · Use the stairs instead of the elevator, at least for a few floors.   · Suggest holding meetings with colleagues during a walk inside or outside the building. · Use the restroom that is the farthest from your desk or workstation. · Use your morning and afternoon breaks to take quick 15-minute walks. Staying safe  · Know your surroundings. Walk in a well-lighted, safe place. If it is dark, walk with a partner. Wear light-colored clothing. If you can, buy a vest or jacket that reflects light. · Carry a cell phone for emergencies. · Drink plenty of water. Take a water bottle with you when you walk. This is very important if it is hot out. · Be careful not to slip on wet or icy ground. You can buy \"grippers\" for your shoes to help keep you from slipping. · Pay attention to your walking surface. Use sidewalks and paths. · If you have breathing problems like asthma or COPD, ask your doctor when it is safe for you to walk outdoors. Cold, dry air, smog, pollen, or other things in the air could cause breathing problems. Where can you learn more? Go to https://Good Works Now.Scopis. org and sign in to your [employer] account. Enter R159 in the [employer] box to learn more about \"Walking for Exercise: Care Instructions. \"     If you do not have an account, please click on the \"Sign Up Now\" link. Current as of: May 5, 2019  Content Version: 12.3  © 8721-4577 [employer]. Care instructions adapted under license by [facility] ([place] [facility]). If you have questions about a medical condition or this instruction, always ask your healthcare professional. [place] [address] any warranty or liability for your use of this information. Low Sodium Diet (2,000 Milligram): Care Instructions  Your Care Instructions    Too much sodium causes your body to hold on to extra water. This can raise your blood pressure and force your heart and kidneys to work harder. In very serious cases, this could cause you to be put in the hospital. It might even be life-threatening.  By limiting sodium, you will feel better and lower your salt has about 2,300 mg of sodium. · Take the salt shaker off the table. · Flavor your food with garlic, lemon juice, onion, vinegar, herbs, and spices. Do not use soy sauce, lite soy sauce, steak sauce, onion salt, garlic salt, celery salt, mustard, or ketchup on your food. · Use low-sodium salad dressings, sauces, and ketchup. Or make your own salad dressings and sauces without adding salt. · Use less salt (or none) when recipes call for it. You can often use half the salt a recipe calls for without losing flavor. Other foods such as rice, pasta, and grains do not need added salt. · Rinse canned vegetables, and cook them in fresh water. This removes some--but not all--of the salt. · Avoid water that is naturally high in sodium or that has been treated with water softeners, which add sodium. Call your local water company to find out the sodium content of your water supply. If you buy bottled water, read the label and choose a sodium-free brand. Avoid high-sodium foods  · Avoid eating:  ? Smoked, cured, salted, and canned meat, fish, and poultry. ? Ham, neil, hot dogs, and luncheon meats. ? Regular, hard, and processed cheese and regular peanut butter. ? Crackers with salted tops, and other salted snack foods such as pretzels, chips, and salted popcorn. ? Frozen prepared meals, unless labeled low-sodium. ? Canned and dried soups, broths, and bouillon, unless labeled sodium-free or low-sodium. ? Canned vegetables, unless labeled sodium-free or low-sodium. ? Western Xin fries, pizza, tacos, and other fast foods. ? Pickles, olives, ketchup, and other condiments, especially soy sauce, unless labeled sodium-free or low-sodium. Where can you learn more? Go to https://mariajose.healthTrampoline. org and sign in to your Datacastle account. Enter R445 in the [facility] box to learn more about \"Low Sodium Diet (2,000 Milligram): Care Instructions. \"     If you do not have an account, please click on

## 2020-03-05 NOTE — PROGRESS NOTES
Ambulatory self drove   [facility] 1960 [phone] (home)  [address] [place] [id number]     64 y.o. Per DR Sanabria   Intent to titrate if stable    Wearing lifevest as directed. Denies dizzy or lightheaded  Is drinking better, keeping hydrated. Not using tylenol daily (no headaches. Urine color is light yellow  Only used bumex  once time  In 7 days. Using 1mg as needed. Denies fever or chills. No dizziness. No edema  BLE no edema,   Skin warm and dry to tough  No n/v/d  Abd: soft, nontender  Lungs: clear  Radial and apical pulse is 84 today. She is able to state use of entresto, bumex, metoprolol and spironolactone. Has no questions on meds this visit. Able to state importance and use of lifevest.   Educated on low sodium diet,  Starting a walking program.       /60 (Site: Left Upper Arm, Position: Sitting, Cuff Size: Medium Adult)   Pulse 84 Comment: radial  Resp 20   Ht 5' 4\" (1.626 m)   Wt 118 lb 6.4 oz (53.7 kg)   SpO2 98%   BMI 20.32 kg/m²       Self checks bps:  Weight log.       BP AM   BP Evening   3-1-2020  106/73  3-2  101/71           95/64  3-3  90/64   99/64   3-4  105/74  3/5  94/64                 past vitals:    Vitals 2/25/2020 2/25/2020 0/52/1958   SYSTOLIC 82 80 88   DIASTOLIC 50 50 50   Site Right Upper Arm Right Upper Arm Right Upper Arm   Position Standing Sitting Supine   Cuff Size Medium Adult Medium Adult Medium Adult   Pulse 76 62 50   Temp      Resp      SpO2 93     Weight      Height      BMI (wt*703/ht~2)      Pain Level      Neck circumference        Vitals 2/25/2020 3/58/7884   SYSTOLIC 80 98   DIASTOLIC 60 60   Site Right Upper Arm    Position Sitting    Cuff Size Medium Adult    Pulse 86 67   Temp     Resp 14 18   SpO2 96 98   Weight 117 lb 9.6 oz 117 lb 9.6 oz   Height 5' 4\" 5' 4\"   BMI (wt*703/ht~2) 20.18 kg/m2 20.18 kg/m2   Pain Level     Neck circumference  11.5       Plan:   Per DR Sanabria:  1. increase Entresto 97-103mg twice daily    2. Repeat blood work in 1-2 weeks  BMP/BNP  (do labs 1-3 days prior to next nurse visit)  Have labs faxed to [phone]    3. Continue to keep hydrated that is drink at least 8 cups of fluid daily, if your blood pressure is below 90 or you are dizzy drink an extra 1 or 2 cups of liquid. 4. Continue to monitor your blood pressures and weights. 5. Intent to increase your metoprolol succinate at next office visit if your heart rate and blood pressure are good and you feel well. That is no dizziness or lightheadedness. 6. Continue cardiac diet    7.  Continue to wear your lifevest

## 2020-03-13 ENCOUNTER — TELEPHONE (OUTPATIENT)
Dept: CARDIOLOGY CLINIC | Age: 60
End: 2020-03-13

## 2020-03-13 RX ORDER — SACUBITRIL AND VALSARTAN 97; 103 MG/1; MG/1
1 TABLET, FILM COATED ORAL 2 TIMES DAILY
Qty: 180 TABLET | Refills: 3 | Status: SHIPPED
Start: 2020-03-13 | End: 2020-07-10 | Stop reason: SDUPTHER

## 2020-03-13 NOTE — TELEPHONE ENCOUNTER
[id number] [address] [place] [address] [address]    Regarding entresto high dose. Per pharmacist  Isidro Moses  picked up 3-5-20  The entresto  97-103mg twice daily for $10.      Next called [facility] Rx assistance  [phone]  2:04 PM , left voice message if any script needed for any  PAP programs. Mary Jane Maria RN       [facility] [place] [phone] (home)      Approved for free med from [employer]. Need 90 day script sent to [facility].  Per Dr Cherri Alcala escribed to [facility]

## 2020-03-16 LAB
B-TYPE NATRIURETIC PEPTIDE: NORMAL
BUN BLDV-MCNC: 25 MG/DL
CALCIUM SERPL-MCNC: NORMAL MG/DL
CHLORIDE BLD-SCNC: NORMAL MMOL/L
CO2: NORMAL
CREAT SERPL-MCNC: 0.96 MG/DL
GFR CALCULATED: NORMAL
GLUCOSE BLD-MCNC: NORMAL MG/DL
POTASSIUM SERPL-SCNC: NORMAL MMOL/L
SODIUM BLD-SCNC: NORMAL MMOL/L

## 2020-03-20 ENCOUNTER — NURSE ONLY (OUTPATIENT)
Dept: CARDIOLOGY CLINIC | Age: 60
End: 2020-03-20
Payer: COMMERCIAL

## 2020-03-20 VITALS
RESPIRATION RATE: 14 BRPM | HEIGHT: 64 IN | TEMPERATURE: 97.7 F | HEART RATE: 58 BPM | DIASTOLIC BLOOD PRESSURE: 56 MMHG | OXYGEN SATURATION: 97 % | BODY MASS INDEX: 19.91 KG/M2 | SYSTOLIC BLOOD PRESSURE: 90 MMHG | WEIGHT: 116.6 LBS

## 2020-03-20 PROCEDURE — 99211 OFF/OP EST MAY X REQ PHY/QHP: CPT | Performed by: INTERNAL MEDICINE

## 2020-03-20 PROCEDURE — 93000 ELECTROCARDIOGRAM COMPLETE: CPT | Performed by: INTERNAL MEDICINE

## 2020-03-20 NOTE — PATIENT INSTRUCTIONS
1. Continue to wear life vest    2. No changes to your heart medication at this time (your heart rate was 58 today)    3. Keep follow up visit already set with Dr Cheri Quiles    4. Continue daily weights    5. Diet should sodium restricted to 2-4 grams a day. Again watch your daily weight trends and if you gain water weight please follow below instructions.     If you gain 3-5 pounds in 2-3 days OR notice that you are retaining fluid in anyway just like you did before then take an extra dose of your water pill (furosemide/Lasix OR bumetanide/Bumex) every day until you lose the weight or feel better.      If you notice that you have taken more than 3 extra doses in 1 week then please call and let us know.     If at any time you feel that you are retaining fluid, your medications are not working, or you feel ill in anyway, then please call us for either same day appointment or the next day, and for instructions. Our goal is to keep you out of the emergency room and the hospital and we have ways to do it. You just need to call us in a timely manner.      If you become sick for other reasons, and notice that you are not urinating as much, the urine is very dark, you have significant diarrhea or vomiting, then please DO NOT take your water pill       Patient Education        metoprolol (oral/injection)  Pronunciation:  me TOE pro lol  Brand:  Kapspargo Sprinkle, Lopressor, Metoprolol Succinate ER, Metoprolol Tartrate, Toprol-XL  What is the most important information I should know about metoprolol? You should not use this medicine if you have a serious heart problem (heart block, sick sinus syndrome, slow heart rate), severe circulation problems, severe heart failure, or a history of slow heart beats that caused fainting. What is metoprolol? Metoprolol is a beta-blocker that affects the heart and circulation (blood flow through arteries and veins).   Metoprolol is used to treat angina (chest pain) and hypertension directions on your prescription label and read all medication guides or instruction sheets. Your doctor may occasionally change your dose. Use the medicine exactly as directed. Metoprolol should be taken with a meal or just after a meal.  Take the medicine at the same time each day. Swallow the capsule  whole and do not crush, chew, break, or open it. A Toprol XL  tablet can be divided in half if your doctor has told you to do so. Swallow the half-tablet whole, without chewing or crushing. Measure liquid medicine carefully. Use the dosing syringe provided, or use a medicine dose-measuring device (not a kitchen spoon). You will need frequent medical tests, and your blood pressure will need to be checked often. If you need surgery, tell the surgeon ahead of time that you are using metoprolol. You should not stop using metoprolol suddenly. Stopping suddenly may make your condition worse. If you have high blood pressure, keep using this medicine even if you feel well. High blood pressure often has no symptoms. You may need to use metoprolol for the rest of your life. Store at room temperature away from moisture and heat. Metoprolol injection is given as an infusion into a vein. A healthcare provider will give you this injection in a medical setting where your heart and blood pressure can be monitored. Metoprolol injections are given for only a short time before switching you to the oral form of this medicine. What happens if I miss a dose? Skip the missed dose and use your next dose at the regular time. Do not use two doses at one time. What happens if I overdose? Seek emergency medical attention or call the Poison Help line at 1-355.157.4891. What should I avoid while taking metoprolol? Avoid driving or hazardous activity until you know how this medicine will affect you. Your reactions could be impaired. Drinking alcohol can increase certain side effects of metoprolol.   What are the possible side compiled for use by healthcare practitioners and consumers in the [facility] and therefore [facility] does not warrant that uses outside of the [facility] are appropriate, unless specifically indicated otherwise. [facility] [place] [facility]'s drug information does not endorse drugs, diagnose patients or recommend therapy. [facility] [place] [facility] drug information is an informational resource designed to assist licensed healthcare practitioners in caring for their patients and/or to serve consumers viewing this service as a supplement to, and not a substitute for, the expertise, skill, knowledge and judgment of healthcare practitioners. The absence of a warning for a given drug or drug combination in no way should be construed to indicate that the drug or drug combination is safe, effective or appropriate for any given patient. [facility] [place] [facility] does not assume any responsibility for any aspect of healthcare administered with the aid of information [facility] provides. The information contained herein is not intended to cover all possible uses, directions, precautions, warnings, drug interactions, allergic reactions, or adverse effects. If you have questions about the drugs you are taking, check with your doctor, nurse or pharmacist.  Copyright 3587-5937 [address] [place] [address] Dr OLIVEIRA. Version: 17.03. Revision date: 5/29/2019. Care instructions adapted under license by [facility] [place] ([facility]). If you have questions about a medical condition or this instruction, always ask your healthcare professional. [place] [address] any warranty or liability for your use of this information. Learning About Coronavirus Disease (COVID-19)  Coronavirus disease (COVID-19): Overview  What is coronavirus disease (COVID-19)? The coronavirus disease 2019 (COVID-19) is caused by a virus. It is an illness that was first found in [place], [place], in December 2019. It has since spread to other countries. The virus can cause fever, cough, and trouble breathing.  In severe cases, it can cause pneumonia and make it hard to breathe without help. It can cause death. Coronaviruses are a large group of viruses. They cause the common cold. They also cause more serious illnesses like Middle East respiratory syndrome (MERS) and severe acute respiratory syndrome (SARS). COVID-19 is caused by a novel coronavirus. That means it's a new type that has not been seen in people before. This virus spreads person-to-person through droplets from coughing and sneezing. It may also spread by touching something that has the virus on it, such as a doorknob or a tabletop. What can you do to protect yourself from coronavirus disease (COVID-19)? The best way to protect yourself from getting sick is to:  · Avoid areas where there is an outbreak. · Avoid contact with people who may be infected. · Wash your hands often with soap or alcohol-based hand sanitizers. · Avoid touching your mouth, nose, and eyes with unwashed hands. What can you do to avoid spreading the virus to others? To help avoid spreading the virus to others:  · Cover your mouth with a tissue when you cough or sneeze. Then throw the tissue in the trash. · Use a disinfectant to clean things that you touch often. · Stay home if you are sick. If you must go to the doctor's office, call ahead for instructions and wear a face mask. When to call for help  Call 911 anytime you think you may need emergency care. For example, call if:  · You have severe trouble breathing. · You have severe dehydration. Symptoms of dehydration include:  ? Dry eyes and a dry mouth. ? Passing only a little urine. ? Feeling thirstier than usual.  · You are extremely confused or not thinking clearly. · You pass out (lose consciousness). Call your doctor now if you develop symptoms such as:  · Shortness of breath. · Fever. · Cough. If you need to get care, call ahead to the doctor's office for instructions before you go.  Make sure you wear a face mask when you go there to

## 2020-03-20 NOTE — PROGRESS NOTES
Ambulatory ,self drove. Approved for [employer] for entresto. Tolerating high dose entresto/   No c/o    Diuretic use: used 1 x when abd bloated (weight up and then lost 3 # with the prn diuretic use) no further use needed. Dizzy lightheaded: denies  Life vest use: use yes. Keeping hydrated: drinking about 8 cups daily fluid  Vitals stable:   Post menopausal  Urine clolor light to clear [id number]      Lungs clear  No edema  Abdomen soft and nontender, no bloating      Monitors sodium in diet     BP 94/60 (Site: Right Upper Arm, Position: Sitting, Cuff Size: Medium Adult)   Pulse 72   Temp 97.7 °F (36.5 °C) (Oral)   Resp 14   Ht 5' 4\" (1.626 m)   Wt 116 lb 9.6 oz (52.9 kg)   SpO2 97%   BMI 20.01 kg/m²        Goal titrate metoprolol succinate:  HR s 58 on ekg. today  Did last visit reduce metoprolol 50mg daily for bradycardia heart rate was 50. Took metoprolol 50mg at 730am today. Results for Miracle Peres (MRN <[id number]>) as of 3/20/2020 09:07   1/19/2020 05:45 1/20/2020 06:14 1/21/2020 05:23 3/16/2020 00:00   Sodium 137 136 139 Pend   Potassium 4.1 4.2 3.5 Pend   Chloride 96 (L) 99 99 Pend   CO2 22 21 (L) 22 Pend   BUN 30 (H) 26 (H) 22 (H) 25   Creatinine 0.9 0.8 0.8 0.96   Anion Gap 19 (H) 16 18 (H)    GFR Non- >60 >60 >60    Gfr Calculated    Pend   GFR  >60 >60 >60    Glucose 133 (H) 110 (H) 98 Pend   Calcium 9.0 9.0 9.0 Pend   Pro-BNP  886 (H)       Per DR Sanabria  Plan:     1. Continue to wear life vest    2. No changes to your heart medication at this time (your heart rate was 58 today)    3. Keep follow up visit already set with Dr Cheri Quiles    4. Continue daily weights    5. Diet should sodium restricted to 2-4 grams a day.  Again watch your daily weight trends and if you gain water weight please follow below instructions.     If you gain 3-5 pounds in 2-3 days OR notice that you are retaining fluid in anyway just like you did before then take an extra dose of your water pill (furosemide/Lasix OR bumetanide/Bumex) every day until you lose the weight or feel better.      If you notice that you have taken more than 3 extra doses in 1 week then please call and let us know.     If at any time you feel that you are retaining fluid, your medications are not working, or you feel ill in anyway, then please call us for either same day appointment or the next day, and for instructions. Our goal is to keep you out of the emergency room and the hospital and we have ways to do it. You just need to call us in a timely manner.       35 min in education today with Jon Penaloza no

## 2020-05-01 ENCOUNTER — OFFICE VISIT (OUTPATIENT)
Dept: CARDIOLOGY CLINIC | Age: 60
End: 2020-05-01
Payer: COMMERCIAL

## 2020-05-01 VITALS
OXYGEN SATURATION: 99 % | RESPIRATION RATE: 16 BRPM | HEART RATE: 70 BPM | WEIGHT: 119.8 LBS | SYSTOLIC BLOOD PRESSURE: 102 MMHG | BODY MASS INDEX: 20.45 KG/M2 | DIASTOLIC BLOOD PRESSURE: 87 MMHG | HEIGHT: 64 IN

## 2020-05-01 PROCEDURE — G8420 CALC BMI NORM PARAMETERS: HCPCS | Performed by: INTERNAL MEDICINE

## 2020-05-01 PROCEDURE — 3017F COLORECTAL CA SCREEN DOC REV: CPT | Performed by: INTERNAL MEDICINE

## 2020-05-01 PROCEDURE — 93000 ELECTROCARDIOGRAM COMPLETE: CPT | Performed by: INTERNAL MEDICINE

## 2020-05-01 PROCEDURE — 99215 OFFICE O/P EST HI 40 MIN: CPT | Performed by: INTERNAL MEDICINE

## 2020-05-01 PROCEDURE — 1036F TOBACCO NON-USER: CPT | Performed by: INTERNAL MEDICINE

## 2020-05-01 PROCEDURE — G8427 DOCREV CUR MEDS BY ELIG CLIN: HCPCS | Performed by: INTERNAL MEDICINE

## 2020-05-01 NOTE — PATIENT INSTRUCTIONS
children as well as elderly adults      The goal is limiting any potential exposure to COVID-19. You need to protect yourself.   ?

## 2020-05-01 NOTE — PROGRESS NOTES
MR (ERO 0.3 cm2)  6. RV dysfunction ( TAPSE 1.4)  7. Moderate TR  8. LBBB,  ms        PLAN:  1. Increase Entresto to 49-51 mg twice daily      2. Decrease bumetanide from twice daily to once daily only      3. Repeat blood work in 2 weeks      4. If the blood work looks ok, we will increase the Entresto to  mg twice daily, and decrease the bumetanide to every other day      5. Diet should sodium restricted to 2-4 grams a day. Again watch your daily weight trends and if you gain water weight please follow below instructions. If you gain 3-5 pounds in 2-3 days OR notice that you are retaining fluid in anyway just like you did before then take an extra dose of your water pill (furosemide/Lasix OR bumetanide/Bumex) every day until you lose the weight or feel better. If you notice that you have taken more than 3 extra doses in 1 week then please call and let us know. If at any time you feel that you are retaining fluid, your medications are not working, or you feel ill in anyway, then please call us for either same day appointment or the next day, and for instructions. Our goal is to keep you out of the emergency room and the hospital and we have ways to do it. You just need to call us in a timely manner. If you become sick for other reasons, and notice that you are not urinating as much, the urine is very dark, you have significant diarrhea or vomiting, then please DO NOT take your water pill and CALL US immediately. Continue to weigh yourself on a regular basis. 5. Return in 1 month for a nurse visit only, to check HR and BP and ECG. If HR is above 65 bpm, nurse to call Dr Elina Lo for med changes      6. Return visit with Dr Elina Lo in 2 months. At that point we determine when last med dose to take place, in order to repeat a TTE, and visit later on ($100 co pay with each visit so trying to minimize her visits)      7.  Patient cannot afford outpatient cardiac MRI right of hypertension 01/13/2020         Past Medical History:   Diagnosis Date    [facility]     HFrEF (heart failure with reduced ejection fraction) ([facility] [address] 01/17/2020 1/14/20- echo- LVEF 15%, stage I DD, RVSF reduced, LA severely dilated, LBBB, severe MR, mild TR, :LVDD: 6.5, RVDD: 3.0    LBBB (left bundle branch block)     Nonischemic cardiomyopathy ([facility] [address]          Past Surgical History:   Procedure Laterality Date    CARDIAC CATHETERIZATION  01/16/2020    DR Maile Rollins         No Known Allergies        Outpatient Medications Marked as Taking for the 5/1/20 encounter (Office Visit) with Casi Bedolla MD   Medication Sig Dispense Refill    sacubitril-valsartan (ENTRESTO)  MG per tablet Take 1 tablet by mouth 2 times daily 180 tablet 3    spironolactone (ALDACTONE) 25 MG tablet Take 1 tablet by mouth daily 90 tablet 1    pantoprazole (PROTONIX) 40 MG tablet Take 40 mg by mouth every morning PCP manages script, via [facility].  vitamin E 1000 units capsule Take 1,000 Units by mouth daily      Acetaminophen (TYLENOL CHILDRENS PO) Take 320 mg by mouth as needed (headache.) childrens tylenol wild berry powder. 160mg per packet.  Uses 2 packets as needed      metoprolol succinate (TOPROL XL) 50 MG extended release tablet Take 1 tablet by mouth daily 90 tablet 1    bumetanide (BUMEX) 1 MG tablet Take 1 tablet by mouth daily as needed (>3# weight gain in a day, increased shortness of breath or swelling.) 90 tablet 3    albuterol sulfate HFA (PROAIR HFA) 108 (90 Base) MCG/ACT inhaler Inhale 2 puffs into the lungs every 4 hours as needed           Review of Systems:   Cardiac: As per HPI  General: No fever, chills, rigors  Pulmonary: As per HPI  HEENT: No visual disturbances, difficult swallowing  GI: No nausea, vomiting, abdominal pain  : No dysuria or hematuria  Endocrine: No thyroid disease or diabetes  Musculoskeletal: VELEZ x 4, no focal motor deficits  Skin: Intact, no rashes  Neuro/Psych: No headache or seizures      Weights: Wt Readings from Last 3 Encounters:   05/01/20 119 lb 12.8 oz (54.3 kg)   03/20/20 116 lb 9.6 oz (52.9 kg)   03/05/20 118 lb 6.4 oz (53.7 kg)     Physical Examination:     BP (!) 88/56 (Site: Left Upper Arm, Position: Sitting, Cuff Size: Medium Adult)   Pulse 68   Resp 16   Ht 5' 4\" (1.626 m)   Wt 119 lb 12.8 oz (54.3 kg)   SpO2 99%   BMI 20.56 kg/m²     CONSTITUTIONAL: Alert and oriented times 3, no acute distress and cooperative to examination with proper mood and affect. SKIN: Skin color, texture, turgor normal. No rashes or lesions. LYMPH: no cervical nodes, no inguinal nodes  HEENT: Head is normocephalic, atraumatic. EOMI, PERRLA. NECK: no JVD  CHEST/LUNGS: chest symmetric with normal A/P diameter, normal respiratory rate and rhythm, lungs clear to auscultation without wheezes, rales or rhonchi. No accessory muscle use. Life vest in place  CARDIOVASCULAR: Heart sounds are normal.  Regular rate and rhythm without murmur, gallop or rub. Normal S1 and S2. . Carotid and femoral pulses 2+/4 and equal bilaterally. ABDOMEN: Normal shape. No and Laparoscopic scar(s) present. Normal bowel sounds. No bruits. soft, nondistended, no masses or organomegaly. no evidence of hernia. Percussion: Normal without hepatosplenomegally. Tenderness: absent. RECTAL: deferred, not clinically indicated  NEUROLOGIC: There are no focalizing motor or sensory deficits. CN II-XII are grossly intact. Chad Semen EXTREMITIES: no cyanosis, no clubbing and no edema. All the following diagnostics were personally reviewed and interpreted by me. Lab Data:  Results for Nirmal Prasad (MRN <[id number]>) as of 5/1/2020 12:18   Ref.  Range 1/17/2020 05:20 1/18/2020 05:46 1/18/2020 11:12 1/19/2020 05:45 1/20/2020 06:14 1/21/2020 05:23 3/16/2020 00:00   Sodium Unknown 139 138  137 136 139 Pend   Potassium Unknown 3.6 3.3 (L)  4.1 4.2 3.5 Pend   Chloride Unknown 93 (L) 92 (L)  96 (L) 99 99 Pend   CO2 Unknown 28 24  22 21 (L) 22 Pend   BUN Latest Units: mg/dL 20 29 (H)  30 (H) 26 (H) 22 (H) 25   Creatinine Unknown 0.8 0.9  0.9 0.8 0.8 0.96   Anion Gap Latest Ref Range: 7 - 16 mmol/L 18 (H) 22 (H)  19 (H) 16 18 (H)    GFR Non- Latest Ref Range: >=60 mL/min/1.73 >60 >60  >60 >60 >60    Gfr Calculated Unknown       Pend   GFR  Unknown >60 >60  >60 >60 >60    Glucose Unknown 115 (H) 140 (H)  133 (H) 110 (H) 98 Pend   Calcium Unknown 9.8 9.1  9.0 9.0 9.0 Pend   Meter Glucose Latest Ref Range: 74 - 99 mg/dL   153 (H)       Pro-BNP Latest Ref Range: 0 - 125 pg/mL  1,418 (H)   886 (H)            TTE (1/14/2020, Dr. Carl Catalan)   Summary:   Moderately dilated left ventricle.   Abnormal (paradoxical) motion consistent with left bundle branch block.   Severely reduced left ventricular systolic function.   There is doppler evidence of stage I diastolic dysfunction.   The left atrium is severely dilated.   Right ventricle global systolic function is reduced.   Severe posterior mitral regurgitation is present.   Mild tricuspid regurgitation.  Merleen Crockett is a small circumferential pericardial effusion noted.         [facility] (1/16/2020)  HEMODYNAMICS:  1.  Aortic pressure 104/85 mmHg. 2.  Left ventricular end-diastolic pressure 37 mmHg. CORONARY ANATOMY:  1.  Left main:  It is medium in size with no angiographic stenosis noted. 2.  LAD:  It is large in caliber and wraps around the apex and gives rise to one diagonal branch.  No angiographic stenosis noted. 3.  Ramus:  It is a very small branch. 4.  Left circumflex:  It is fair in size and giving rise to a large bifurcating obtuse marginal branch, then continues to AV groove in three  or four small branches.  No angiographic stenosis noted. 5.  RCA:  It is large and dominant giving rise to a conus branch, an SA jamar or AV jamar branch, an RV marginal branch, then a PDA and PLV branch.  No angiographic stenosis noted.   Left ventriculogram that you have taken more than 3 extra doses in 1 week then please call and let us know. If at any time you feel that you are retaining fluid, your medications are not working, or you feel ill in anyway, then please call us for either same day appointment or the next day, and for instructions. Our goal is to keep you out of the emergency room and the hospital and we have ways to do it. You just need to call us in a timely manner. If you become sick for other reasons, and notice that you are not urinating as much, the urine is very dark, you have significant diarrhea or vomiting, then please DO NOT take your water pill and CALL US immediately. Continue to weigh yourself on a regular basis. 7. As you know, there is concern about the spread of COVID-19.  ? People with cardiovascular conditions such as yourself are vulnerable to infection. ?  We recommend that you remain diligent about social distancing:  -staying 6 feet away from individuals when out in public  -limit public trips for necessary reasons only  -washing your hands frequently  -sanitize all hard surfaces including grocery cart handles  -remain hydrated  -if you feel unwell, call your doctor for advice on what to do  -If you can, avoid prolonged exposure to little children as well as elderly adults      The goal is limiting any potential exposure to COVID-19. You need to protect yourself. ?            I spent > 40 minutes reviewing the entire medical record, diagnostics, counseling regarding prognosis. John Paul Rapp M.D.  [facility]  Heart Failure and Pulmonary Hypertension  Mobile [phone]

## 2020-05-08 ENCOUNTER — HOSPITAL ENCOUNTER (OUTPATIENT)
Age: 60
Discharge: HOME | End: 2020-05-08
Payer: COMMERCIAL

## 2020-05-08 LAB
ANION GAP SERPL CALCULATED.3IONS-SCNC: 14 MMOL/L (ref 7–16)
BUN BLDV-MCNC: 13 MG/DL (ref 6–20)
CALCIUM SERPL-MCNC: 9.5 MG/DL (ref 8.6–10.2)
CHLORIDE BLD-SCNC: 101 MMOL/L (ref 98–107)
CO2: 25 MMOL/L (ref 22–29)
CREAT SERPL-MCNC: 0.8 MG/DL (ref 0.5–1)
GFR AFRICAN AMERICAN: >60
GFR NON-AFRICAN AMERICAN: >60 ML/MIN/1.73
GLUCOSE BLD-MCNC: 104 MG/DL (ref 74–99)
POTASSIUM SERPL-SCNC: 4.6 MMOL/L (ref 3.5–5)
PRO-BNP: 688 PG/ML (ref 0–125)
SODIUM BLD-SCNC: 140 MMOL/L (ref 132–146)

## 2020-05-08 PROCEDURE — 83880 ASSAY OF NATRIURETIC PEPTIDE: CPT

## 2020-05-08 PROCEDURE — 80048 BASIC METABOLIC PNL TOTAL CA: CPT

## 2020-05-08 PROCEDURE — 36415 COLL VENOUS BLD VENIPUNCTURE: CPT

## 2020-06-10 ENCOUNTER — TELEPHONE (OUTPATIENT)
Dept: CARDIOLOGY | Age: 60
End: 2020-06-10

## 2020-06-12 ENCOUNTER — HOSPITAL ENCOUNTER (OUTPATIENT)
Dept: CARDIOLOGY | Age: 60
Discharge: HOME | End: 2020-06-12
Payer: COMMERCIAL

## 2020-06-12 LAB
LV EF: 25 %
LVEF MODALITY: NORMAL

## 2020-06-12 PROCEDURE — 93306 TTE W/DOPPLER COMPLETE: CPT

## 2020-06-16 ENCOUNTER — TELEPHONE (OUTPATIENT)
Dept: CARDIOLOGY CLINIC | Age: 60
End: 2020-06-16

## 2020-06-16 NOTE — TELEPHONE ENCOUNTER
Called and left detailed message as per IMMANUEL Chung cnp  Reviewed s/s chf to report to office, also reminder of upcoming visit with Lesley Claudio.    Life vest use  Extended called to [place] with Patrick Given RN. 6/16/2020     Lesley Claudio please place order for extension of wearable cardiac defibrillator    Thank you

## 2020-07-10 ENCOUNTER — OFFICE VISIT (OUTPATIENT)
Dept: CARDIOLOGY CLINIC | Age: 60
End: 2020-07-10
Payer: COMMERCIAL

## 2020-07-10 VITALS
SYSTOLIC BLOOD PRESSURE: 86 MMHG | WEIGHT: 119.2 LBS | DIASTOLIC BLOOD PRESSURE: 54 MMHG | RESPIRATION RATE: 16 BRPM | HEIGHT: 64 IN | BODY MASS INDEX: 20.35 KG/M2 | HEART RATE: 76 BPM

## 2020-07-10 PROCEDURE — 3017F COLORECTAL CA SCREEN DOC REV: CPT | Performed by: NURSE PRACTITIONER

## 2020-07-10 PROCEDURE — G8427 DOCREV CUR MEDS BY ELIG CLIN: HCPCS | Performed by: NURSE PRACTITIONER

## 2020-07-10 PROCEDURE — 93000 ELECTROCARDIOGRAM COMPLETE: CPT | Performed by: INTERNAL MEDICINE

## 2020-07-10 PROCEDURE — 99214 OFFICE O/P EST MOD 30 MIN: CPT | Performed by: NURSE PRACTITIONER

## 2020-07-10 PROCEDURE — G8420 CALC BMI NORM PARAMETERS: HCPCS | Performed by: NURSE PRACTITIONER

## 2020-07-10 PROCEDURE — 1036F TOBACCO NON-USER: CPT | Performed by: NURSE PRACTITIONER

## 2020-07-10 RX ORDER — BUMETANIDE 1 MG/1
1 TABLET ORAL DAILY PRN
Qty: 90 TABLET | Refills: 6 | Status: SHIPPED
Start: 2020-07-10 | End: 2020-07-20 | Stop reason: SDUPTHER

## 2020-07-10 RX ORDER — SACUBITRIL AND VALSARTAN 97; 103 MG/1; MG/1
1 TABLET, FILM COATED ORAL 2 TIMES DAILY
Qty: 180 TABLET | Refills: 6 | Status: SHIPPED
Start: 2020-07-10 | End: 2022-08-18 | Stop reason: SDUPTHER

## 2020-07-10 RX ORDER — SPIRONOLACTONE 25 MG/1
25 TABLET ORAL DAILY
Qty: 90 TABLET | Refills: 6 | Status: SHIPPED
Start: 2020-07-10 | End: 2020-07-15

## 2020-07-10 RX ORDER — METOPROLOL SUCCINATE 50 MG/1
50 TABLET, EXTENDED RELEASE ORAL DAILY
Qty: 90 TABLET | Refills: 6 | Status: SHIPPED
Start: 2020-07-10 | End: 2020-07-15

## 2020-07-10 NOTE — PROGRESS NOTES
Faxed DR Pizano referral from [facility]  CNP  Records faxed to [facility] for echo and film be packs sent with reports to Harvinder Rosalina Benji. Mailed [address] contact info she requested. She will call in 2 weeks his office if she doesn't hear from them to set advanced HF visit to get established.      Judith Kaplan RN

## 2020-07-10 NOTE — PROGRESS NOTES
[facility] Cardiology  Office Visit         Reason for Visit: Heart failure    Cardiologist: Dr. Sue Sidhu, patient request to follow with Dr. Butch Delgado (has family members that also see him). History of Present Illness:     Ms. 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     She presents today in follow-up, since her last office visit she denies any urgent care visits or hospitalizations. She did undergo TTE on 6/12/2020 that demonstrated signs of remodeling with LVEF improving from 15% to 25%, LVEDD 6.5 > 6, LVMI 144 > 128, severe MR > mild-moderate MR and now preserved RV function. She has been tolerating high-dose Entresto, spironolactone. Toprol titration limited to resting heart rate and hypotension. She is currently taking 50 mg daily. She has been using PRN Bumex approximately 3 times weekly. Her weights have remained stable, she is monitoring her diet for sodium content and she has noted a slow improvement in her functional capacity. She denies dyspnea with exertion, shortness of breath, or decline in overall functional capacity. She denies orthopnea, PND, nocturnal cough or hemoptysis. She denies abdominal distention or bloating, early satiety, anorexia/change in appetite, unintentional weight loss. She does not lower extremity edema. She denies exertional lightheadedness. She denies palpitations, syncope or near syncope. Review of systems is negative for chest pain, pressure, discomfort. When ambulating on an incline, She does not leg claudication. History is negative for neurological symptoms including transient loss of vision, asymmetric weakness, aphasia, dysphasia, numbness, tingling. Past medical, surgical, family and social histories have been reviewed. Any changes in the past medical history, social history or family history have been made and are reflected in the electronic medical record.        Patient Active Problem List    Diagnosis Date Noted    Abnormal EKG     Nonischemic cardiomyopathy ([facility]     Viral cardiomyopathy ([facility]     New onset of congestive heart failure ([facility]     Hypokalemia     Admitted for acute congestive heart failure ([facility] 01/13/2020    Hyperglycemia 01/13/2020    Pleural effusion on right 01/13/2020    Elevated blood pressure reading without diagnosis of hypertension 01/13/2020         Past Medical History:   Diagnosis Date    [facility]     HFrEF (heart failure with reduced ejection fraction) ([facility] 01/17/2020 1/14/20- echo- LVEF 15%, stage I DD, RVSF reduced, LA severely dilated, LBBB, severe MR, mild TR, :LVDD: 6.5, RVDD: 3.0    LBBB (left bundle branch block)     Nonischemic cardiomyopathy ([facility]          Past Surgical History:   Procedure Laterality Date    CARDIAC CATHETERIZATION  01/16/2020    DR Adriana Hedrick         No Known Allergies        Outpatient Medications Marked as Taking for the 7/10/20 encounter (Office Visit) with RAJESH Edge - CNP   Medication Sig Dispense Refill    sacubitril-valsartan (ENTRESTO)  MG per tablet Take 1 tablet by mouth 2 times daily 180 tablet 3    spironolactone (ALDACTONE) 25 MG tablet Take 1 tablet by mouth daily 90 tablet 1    pantoprazole (PROTONIX) 40 MG tablet Take 40 mg by mouth every morning PCP manages script, via [facility].  vitamin E 1000 units capsule Take 1,000 Units by mouth daily      Acetaminophen (TYLENOL CHILDRENS PO) Take 320 mg by mouth as needed (headache.) childrens tylenol wild berry powder. 160mg per packet.  Uses 2 packets as needed      metoprolol succinate (TOPROL XL) 50 MG extended release tablet Take 1 tablet by mouth daily 90 tablet 1    bumetanide (BUMEX) 1 MG tablet Take 1 tablet by mouth daily as needed (>3# weight gain in a day, increased shortness of breath or swelling.) 90 tablet 3    albuterol sulfate HFA (PROAIR HFA) 108 (90 Base) MCG/ACT inhaler Inhale 2 puffs into sensory deficits. CN II-XII are grossly intact. [place] EXTREMITIES: no cyanosis, no clubbing and no edema. Warm and well perfused. All the following diagnostics were personally reviewed and interpreted by me.        LAB DATA:     5/8/2020 07:48   Sodium 140   Potassium 4.6   Chloride 101   CO2 25   BUN 13   Creatinine 0.8   Anion Gap 14   GFR Non- >60   GFR African American >60   Glucose 104 (H)   Calcium 9.5   Pro- (H)      1/15/2020 14:50 1/16/2020 05:01   RESPIRATORY PANEL, MOLECULAR Rpt    Coxsackie B1 Ab  1:40   Coxsackie B2 Ab  1:10   Coxsackie B3 Ab  <1:10   Coxsackie B4 Ab  1:320 (H)   Coxsackie B5 Ab  <1:10   Coxsackie B6 Ab  <1:10   EBV VCA IgM  <10.0   HIV-1/HIV-2 Ab  Non-Reactive   Parvovirus B19 IgG  0.40   Parvovirus B19 IgM  0.09   Influenza A by PCR Not Detected    Influenza B by PCR Not Detected    Adenovirus by PCR Not Detected    Coronavirus 229E by PCR Not Detected    Coronavirus HKU1 by PCR Not Detected    Coronavirus NL63 by PCR Not Detected    Coronavirus OC43 by PCR Not Detected    Human Metapneumovirus by PCR Not Detected    Human Rhinovirus/Enterovirus by PCR Not Detected    Parainfluenza Virus 1 by PCR Not Detected    Parainfluenza Virus 2 by PCR Not Detected    Parainfluenza Virus 3 by PCR Not Detected    Parainfluenza Virus 4 by PCR Not Detected    Respiratory Syncytial Virus by PCR Not Detected    Bordetella parapertussis by PCR Not Detected    Chlamydophilia pneumoniae by PCR Not Detected    Mycoplasma pneumoniae by PCR Not Detected    CMV IgG  SEE BELOW   CMV IgM  SEE BELOW         IMAGING:    CTA Chest (1/13/2020)  Impression  Cardiomegaly with small pericardial effusion, the left ventricle is  dilated, the overall appearance of the chest suggest congestive heart failure  Bilateral pleural effusions and infiltrates  Mild airspace disease suggesting pulmonary edema       CARDIAC TESTING:     TTE (1/14/2020, Dr. Ashli Phillips)   Summary:   Moderately dilated left ventricle.   Abnormal (paradoxical) motion consistent with left bundle branch block.   Severely reduced left ventricular systolic function.   There is doppler evidence of stage I diastolic dysfunction.   The left atrium is severely dilated.   Right ventricle global systolic function is reduced.   Severe posterior mitral regurgitation is present.   Mild tricuspid regurgitation.  Christie Comment is a small circumferential pericardial effusion noted.     [facility] (1/16/2020)  HEMODYNAMICS:  1.  Aortic pressure 104/85 mmHg. 2.  Left ventricular end-diastolic pressure 37 mmHg. CORONARY ANATOMY:  1.  Left main:  It is medium in size with no angiographic stenosis noted. 2.  LAD:  It is large in caliber and wraps around the apex and gives rise to one diagonal branch.  No angiographic stenosis noted. 3.  Ramus:  It is a very small branch. 4.  Left circumflex:  It is fair in size and giving rise to a large bifurcating obtuse marginal branch, then continues to AV groove in three  or four small branches.  No angiographic stenosis noted. 5.  RCA:  It is large and dominant giving rise to a conus branch, an SA jamar or AV jamar branch, an RV marginal branch, then a PDA and PLV branch.  No angiographic stenosis noted. Left ventriculogram revealed significantly dilated left ventricle with an ejection fraction of 10% to 15%.  There was severe mitral  regurgitation noted. IMPRESSION:  1.  Absence of angiographic coronary stenosis. 2.  Significantly elevated left ventricular end-diastolic pressure at 37 mmHg. 3.  Dilated left ventricle with an ejection fraction of 10% to 15%. 4.  Severe mitral regurgitation. TTE (6/12/2020, Dr. Noemy Maria)   Summary:   Mildly dilated left ventricle. Abnormal (paradoxical) motion consistent with conduction abnormality. Severely reduced left ventricular systolic function. There is doppler evidence of stage I diastolic dysfunction. Mild-moderate mitral regurgitation is present.    Mild tricuspid regurgitation.     6 minute walk testing 1/28/2020:  1600 feet  No desaturations  Appropriate HR response, poor HR recovery  Normal BP response  Bryce score at peak was 2      ECG:   Sinus Rhythm   Left bundle branch block. Combined atrial enlargement. Guideline directed medical/device therapy:  ARNI/ACE I/ARB: Yes  Beta blocker: Yes  Aldosterone antagonist:  Yes  ICD/CRT-P/-D:  Wearable cardioverter defibrillator  QRS interval on recent ECG (personally reviewed/interpreted): >150 ms  Percentage RV pacing (personally reviewed/interpreted): %/NA         ASSESSMENT:  1. Chronic HFrEF  2. ACC stage C / NYHA class II  3. Euvolemic   4. Nonischemic cardiomyopathy  5. Positive coxsackie B4 Ab  5. LVEF improved from 15% > 25%  6. LVEDD 6.5 > 6  7. LVMI 144 > 128  8. Severe MR (ERO 0.3) > Mild/moderate MR (ERO 0.2)  9. Preserved RV function on most recent TTE  10. LBBB  11. Wearing LifeVest - no alarms or discharges           PLAN:  1. Continue current cardiac medications. 2. Get blood work in the next week or two    3. Referral to cardiac rehab    4. Continue to wear lifevest and we will get echocardiogram in 3 months    5. Return visit with Dr. Sita Benitez 6 months. 6. Advanced heart failure referral for establishment     7. Weigh yourself daily    -Stay Hydrated    -Diet should sodium restricted to 2 grams    -Again watch your daily weight trends and if you gain water weight please follow below instructions.    -If you gain 3-5 pounds in 2-3 days OR notice that you are retaining fluid in anyway just like you did before then take an extra dose of your water pill (bumetanide/Bumex) every day until you lose the weight or feel better.    -If you notice that you have taken more than 3 extra doses in 1 week then please call and let us know.     -If at any time you feel that you are retaining fluid, your medications are not working, or you feel ill in anyway, then please call us for either same day appointment or the next day, and for instructions. Our goal is to keep you out of the emergency room and the hospital and we have ways to do it. You just need to call us in a timely manner.     -If you become sick for other reasons, and notice that you are not urinating as much, the urine is very dark, you have significant diarrhea or vomiting, then please DO NOT take your water pill and CALL US immediately. Sherry MENA-SUPA  [facility] Card[facility].

## 2020-07-10 NOTE — PROGRESS NOTES
Would prefer [facility] advanced provider. Closer to home    Wearing Lifevest.    Taking bumex 1mg three times a week (PRN) for belly bloating. Urinates well. Good response    bp's run systolic 23'A at home (keeping hydrated eliminated the dizziness) asymptomatic with systolic 98'Q. Few times systoli'cs high  70's. Checks bp  After takes am meds    Had reduced metoprolol dosing when HR was in the 50's. Tolerating dose reduction   .

## 2020-07-10 NOTE — PATIENT INSTRUCTIONS
1. Continue current cardiac medications. 2. Get blood work in the next week or two    3. Referral to cardiac rehab    4. Continue to wear lifevest and we will get echocardiogram in 3 months    5. Return visit with Dr. Azael Davis 6 months. 6. Advanced heart failure referral     7. Weigh yourself daily    -Stay Hydrated    -Diet should sodium restricted to 2 grams    -Again watch your daily weight trends and if you gain water weight please follow below instructions.    -If you gain 3-5 pounds in 2-3 days OR notice that you are retaining fluid in anyway just like you did before then take an extra dose of your water pill ( bumetanide/Bumex) every day until you lose the weight or feel better.     -If you notice that you have taken more than 3 extra doses in 1 week then please call and let us know. -If at any time you feel that you are retaining fluid, your medications are not working, or you feel ill in anyway, then please call us for either same day appointment or the next day, and for instructions. Our goal is to keep you out of the emergency room and the hospital and we have ways to do it. You just need to call us in a timely manner.     -If you become sick for other reasons, and notice that you are not urinating as much, the urine is very dark, you have significant diarrhea or vomiting, then please DO NOT take your water pill and CALL US immediately.

## 2020-07-13 ENCOUNTER — HOSPITAL ENCOUNTER (OUTPATIENT)
Age: 60
Discharge: HOME | End: 2020-07-13
Payer: COMMERCIAL

## 2020-07-13 LAB
ANION GAP SERPL CALCULATED.3IONS-SCNC: 14 MMOL/L (ref 7–16)
BUN BLDV-MCNC: 25 MG/DL (ref 6–20)
CALCIUM SERPL-MCNC: 9.7 MG/DL (ref 8.6–10.2)
CHLORIDE BLD-SCNC: 99 MMOL/L (ref 98–107)
CO2: 25 MMOL/L (ref 22–29)
CREAT SERPL-MCNC: 0.8 MG/DL (ref 0.5–1)
GFR AFRICAN AMERICAN: >60
GFR NON-AFRICAN AMERICAN: >60 ML/MIN/1.73
GLUCOSE BLD-MCNC: 110 MG/DL (ref 74–99)
POTASSIUM SERPL-SCNC: 3.9 MMOL/L (ref 3.5–5)
PRO-BNP: 649 PG/ML (ref 0–125)
SODIUM BLD-SCNC: 138 MMOL/L (ref 132–146)

## 2020-07-13 PROCEDURE — 83880 ASSAY OF NATRIURETIC PEPTIDE: CPT

## 2020-07-13 PROCEDURE — 80048 BASIC METABOLIC PNL TOTAL CA: CPT

## 2020-07-13 PROCEDURE — 36415 COLL VENOUS BLD VENIPUNCTURE: CPT

## 2020-07-13 NOTE — RESULT ENCOUNTER NOTE
Please let Adria Uribe know that we received her labs  They are stable  No changes at this time  Follow up as previously directed unless any changes in symptoms. Thank you.

## 2020-07-14 ENCOUNTER — TELEPHONE (OUTPATIENT)
Dept: CARDIOLOGY CLINIC | Age: 60
End: 2020-07-14

## 2020-07-14 NOTE — TELEPHONE ENCOUNTER
----- Message from RAJESH Villafana CNP sent at 7/13/2020  2:42 PM EDT -----  Please let Mumtaz Doctor know that we received her labs  They are stable  No changes at this time  Follow up as previously directed unless any changes in symptoms. Thank you.

## 2020-07-15 RX ORDER — METOPROLOL SUCCINATE 50 MG/1
50 TABLET, EXTENDED RELEASE ORAL DAILY
Qty: 90 TABLET | Refills: 1 | Status: SHIPPED
Start: 2020-07-15 | End: 2020-07-20 | Stop reason: SDUPTHER

## 2020-07-15 RX ORDER — SPIRONOLACTONE 25 MG
25 TABLET ORAL DAILY
Qty: 90 TABLET | Refills: 1
Start: 2020-07-15 | End: 2020-07-20 | Stop reason: SDUPTHER

## 2020-07-15 NOTE — TELEPHONE ENCOUNTER
[facility] sent fax. They do not fill toprol / aldactone. Scripts sent to [facility].  Per IMMANUEL Chung CNP

## 2020-07-16 NOTE — TELEPHONE ENCOUNTER
Gerda  office faxed office.  initial advanced HF visit with DR EWING [facility] is set 7 31 2020.  @ 11am

## 2020-07-20 RX ORDER — METOPROLOL SUCCINATE 50 MG/1
50 TABLET, EXTENDED RELEASE ORAL DAILY
Qty: 90 TABLET | Refills: 1 | Status: SHIPPED
Start: 2020-07-20 | End: 2021-01-27 | Stop reason: SDUPTHER

## 2020-07-20 RX ORDER — BUMETANIDE 1 MG/1
1 TABLET ORAL DAILY PRN
Qty: 90 TABLET | Refills: 2 | Status: SHIPPED
Start: 2020-07-20 | End: 2021-02-15 | Stop reason: SDUPTHER

## 2020-07-20 RX ORDER — SPIRONOLACTONE 25 MG/1
25 TABLET ORAL DAILY
Qty: 90 TABLET | Refills: 1 | Status: SHIPPED
Start: 2020-07-20 | End: 2021-01-27 | Stop reason: SDUPTHER

## 2020-07-20 NOTE — PROGRESS NOTES
Patient called.  Requested mail delivery of spironolactone, bumex,  Metoprolol    90 day scripts sent via  [place] [employer] SUPA Ceja RN

## 2020-07-22 ENCOUNTER — HOSPITAL ENCOUNTER (OUTPATIENT)
Dept: CARDIAC REHAB | Age: 60
Setting detail: THERAPIES SERIES
Discharge: HOME | End: 2020-07-22
Payer: COMMERCIAL

## 2020-07-22 VITALS
HEIGHT: 64 IN | BODY MASS INDEX: 20.49 KG/M2 | HEART RATE: 68 BPM | WEIGHT: 120 LBS | OXYGEN SATURATION: 96 % | RESPIRATION RATE: 20 BRPM | DIASTOLIC BLOOD PRESSURE: 57 MMHG | SYSTOLIC BLOOD PRESSURE: 83 MMHG

## 2020-07-22 ASSESSMENT — PATIENT HEALTH QUESTIONNAIRE - PHQ9: SUM OF ALL RESPONSES TO PHQ QUESTIONS 1-9: 2

## 2020-07-27 NOTE — PROGRESS NOTES
Patient called. Says she was never on vitamin E. Removed from med list as she does not take E.    She will take pill bottles to her new advanced HF    [facility] 7 31 2020

## 2020-07-29 ENCOUNTER — HOSPITAL ENCOUNTER (OUTPATIENT)
Dept: CARDIAC REHAB | Age: 60
Setting detail: THERAPIES SERIES
Discharge: HOME | End: 2020-07-29
Payer: COMMERCIAL

## 2020-07-29 PROCEDURE — 93798 PHYS/QHP OP CAR RHAB W/ECG: CPT

## 2020-08-03 ENCOUNTER — HOSPITAL ENCOUNTER (OUTPATIENT)
Dept: CARDIAC REHAB | Age: 60
Setting detail: THERAPIES SERIES
Discharge: HOME | End: 2020-08-03
Payer: COMMERCIAL

## 2020-08-03 PROCEDURE — 93798 PHYS/QHP OP CAR RHAB W/ECG: CPT

## 2020-08-04 ENCOUNTER — TELEPHONE (OUTPATIENT)
Dept: CARDIAC REHAB | Age: 60
End: 2020-08-04

## 2020-08-04 NOTE — TELEPHONE ENCOUNTER
8/4/2020 11:19 am Nutrition: Confirmed appointment for 8/10/2020 at 10:15 am.  Electronically signed by Joanie Andrews RD, CHRISTIANO on 8/4/20 at 11:20 AM EDT

## 2020-08-05 ENCOUNTER — HOSPITAL ENCOUNTER (OUTPATIENT)
Dept: CARDIAC REHAB | Age: 60
Setting detail: THERAPIES SERIES
Discharge: HOME | End: 2020-08-05
Payer: COMMERCIAL

## 2020-08-05 PROCEDURE — 93798 PHYS/QHP OP CAR RHAB W/ECG: CPT

## 2020-08-07 ENCOUNTER — HOSPITAL ENCOUNTER (OUTPATIENT)
Dept: CARDIAC REHAB | Age: 60
Setting detail: THERAPIES SERIES
Discharge: HOME | End: 2020-08-07
Payer: COMMERCIAL

## 2020-08-07 PROCEDURE — 93798 PHYS/QHP OP CAR RHAB W/ECG: CPT

## 2020-08-10 ENCOUNTER — HOSPITAL ENCOUNTER (OUTPATIENT)
Dept: CARDIAC REHAB | Age: 60
Setting detail: THERAPIES SERIES
Discharge: HOME | End: 2020-08-10
Payer: COMMERCIAL

## 2020-08-10 VITALS — WEIGHT: 119 LBS | HEIGHT: 64 IN | BODY MASS INDEX: 20.32 KG/M2

## 2020-08-10 PROCEDURE — 93798 PHYS/QHP OP CAR RHAB W/ECG: CPT

## 2020-08-10 NOTE — PROGRESS NOTES
Outpatient Dietitian Assessment  8/10/2020    Carrie Palacios 64 y.o. female    PCP:   Iris Corrigan MD    Assessment:  Subjective data:Patient in for nutrition counseling. Patient reports she eats healthy, does not want to gain weight and likes to snack. Objective data:  Temperature: 97.2 degrees. Negative for fever. Weight History:Patient reports she does not want to weigh more than 120 pounds. Weight goal 119 pounds discussed. Appearance:nourished appearance and slender frame size. Comparative Standards:REE (Resting Energy Expenditure 1115 calories; Estimated total calories: 1300- 1400 calories/day ( [place],actual wt.)      Pertinent Past Medical/Surgical History:CHF, abnormal EKG, elevated blood pressure reading without diagnosis of hypertension, hypokalemia,  nonischemic cardiomyopathy, viral cardiomyopathy, left bundle branch block, heart failure with reduced ejection fraction ( 15% ) s/p Life Vest; recent cardiac cath    Drug/Nutrient Interactions: none seen currently; on Entresto     Alcohol use:no alcohol     Diet at home: no added salt,     Food allergies/Intolerances: none    Appetite:good    Cultural, Hoahaoism or ethnic food preferences: none    Difficulty Chewing and/or Swallowing:difficult swallowing meats, does not eat beef    Own teeth/Dentures/Implants:own teeth    Adequate Funds for Real [employer]    Grocery Shops:Patient shops at grocery stores. Cooking Skills:Patient cooks.      Likes fruit, vegetables, eats eggs, eats salad three times per week, eats fish and chicken, drinks 2% milk     24 hour Dietary Recall:    Breakfast: 10 am, at home, leftover 4 ounces baked fish ( catfish), 16 ounces unflavored bottled water and 1 cup regular coffee with liquid dieudonne creamer  Lunch: none  PM snack: noon,  at home, 1 banana, 2 oranges and 16 ounces unflavored bottled water  Dinner:  4:00 pm- 4:30 pm, at home,   2-3 ounces [place] With 2 cups salad with balsmic vinegar, lettuce, strawberries and walnuts, 1 cup hot pomegrant tea, no sugar)  Evening snack: 7:30 pm- 8:00 pm, at home, 1/2 cup egg salad with  5 unsalted saltines , 10 ounces unflavored water    Frequency of Eating at [employer] during week: (prior to pandemic- 1 to 2 times per week, getting take out- once a week now)     Frequency of Eating at Sh[employer] during week: (prior to pandemic - none)     Frequency and Type of Caffeine Consumption on a daily basis: 1 cup coffee and 2 cups hot tea    Water consumption on a daily basis: 32 ounces bottled water     Frequency of Pop/Soda consumed:none      Ht Readings from Last 1 Encounters:   08/10/20 5' 4\" (1.626 m)     Wt Readings from Last 3 Encounters:   08/10/20 119 lb (54 kg)   07/31/20 119 lb 9.6 oz (54.3 kg)   07/22/20 120 lb (54.4 kg)     Body mass index is 20.43 kg/m². Waist circumference:deferred  Body Fat %:deferred    Diagnosis:  Inadequate knowledge  related to history cardiac dysfunction and insufficient knowledge as evident by patient comments. ( update: BMI within normal limits)     Intervention:    RD 1: 1 visit    Meet estimated needs. Maintain weight. Adequate hydration. Increased knowledge. Improved compliance. Follow individualized diet plan. Education provided:Medical Nutrition Therapy Heart Healthy Low Sodium, My Plate, Meal Planning Made Easy, Reaching Nutrition Goals, 3 day food record and rate your plate form ( 60 score)     Interdisciplinary Treatment Plan (ITP) note: Patient is a phase II participant and ITP done. Monitoring/Evaluation:  RD will monitor PO intake and weight data as available. The goals set by the patient are to maintain weight, increase water consumption, not have late night snacks, have healthy snacks, eat less cheese, avoid pizza, grain wraps, follow dietary guidelines and  complete 3 day food record. The patient will be followed on September 14, 2020. Nieves Arce M.A.,R.D., L.D.   Contact information: [phone].

## 2020-08-12 ENCOUNTER — HOSPITAL ENCOUNTER (OUTPATIENT)
Dept: CARDIAC REHAB | Age: 60
Setting detail: THERAPIES SERIES
End: 2020-08-12
Payer: COMMERCIAL

## 2020-08-14 ENCOUNTER — HOSPITAL ENCOUNTER (OUTPATIENT)
Dept: CARDIAC REHAB | Age: 60
Setting detail: THERAPIES SERIES
Discharge: HOME | End: 2020-08-14
Payer: COMMERCIAL

## 2020-08-14 PROCEDURE — 93798 PHYS/QHP OP CAR RHAB W/ECG: CPT

## 2020-08-17 ENCOUNTER — HOSPITAL ENCOUNTER (OUTPATIENT)
Dept: CARDIAC REHAB | Age: 60
Setting detail: THERAPIES SERIES
Discharge: HOME | End: 2020-08-17
Payer: COMMERCIAL

## 2020-08-17 PROCEDURE — 93798 PHYS/QHP OP CAR RHAB W/ECG: CPT

## 2020-08-19 ENCOUNTER — HOSPITAL ENCOUNTER (OUTPATIENT)
Dept: CARDIAC REHAB | Age: 60
Setting detail: THERAPIES SERIES
Discharge: HOME | End: 2020-08-19
Payer: COMMERCIAL

## 2020-08-19 PROCEDURE — 93798 PHYS/QHP OP CAR RHAB W/ECG: CPT

## 2020-08-21 ENCOUNTER — HOSPITAL ENCOUNTER (OUTPATIENT)
Dept: CARDIAC REHAB | Age: 60
Setting detail: THERAPIES SERIES
Discharge: HOME | End: 2020-08-21
Payer: COMMERCIAL

## 2020-08-21 PROCEDURE — 93798 PHYS/QHP OP CAR RHAB W/ECG: CPT

## 2020-08-24 ENCOUNTER — HOSPITAL ENCOUNTER (OUTPATIENT)
Dept: CARDIAC REHAB | Age: 60
Setting detail: THERAPIES SERIES
Discharge: HOME | End: 2020-08-24
Payer: COMMERCIAL

## 2020-08-24 PROCEDURE — 93798 PHYS/QHP OP CAR RHAB W/ECG: CPT

## 2020-08-25 ENCOUNTER — HOSPITAL ENCOUNTER (OUTPATIENT)
Dept: CARDIAC REHAB | Age: 60
Setting detail: THERAPIES SERIES
Discharge: HOME | End: 2020-08-25
Payer: COMMERCIAL

## 2020-08-25 PROCEDURE — 93798 PHYS/QHP OP CAR RHAB W/ECG: CPT

## 2020-08-28 ENCOUNTER — HOSPITAL ENCOUNTER (OUTPATIENT)
Dept: CARDIAC REHAB | Age: 60
Setting detail: THERAPIES SERIES
Discharge: HOME | End: 2020-08-28
Payer: COMMERCIAL

## 2020-08-28 PROCEDURE — 93798 PHYS/QHP OP CAR RHAB W/ECG: CPT

## 2020-08-31 ENCOUNTER — HOSPITAL ENCOUNTER (OUTPATIENT)
Dept: CARDIAC REHAB | Age: 60
Setting detail: THERAPIES SERIES
Discharge: HOME | End: 2020-08-31
Payer: COMMERCIAL

## 2020-08-31 PROCEDURE — 93798 PHYS/QHP OP CAR RHAB W/ECG: CPT

## 2020-09-02 ENCOUNTER — HOSPITAL ENCOUNTER (OUTPATIENT)
Dept: CARDIAC REHAB | Age: 60
Setting detail: THERAPIES SERIES
Discharge: HOME | End: 2020-09-02
Payer: COMMERCIAL

## 2020-09-02 PROCEDURE — 93798 PHYS/QHP OP CAR RHAB W/ECG: CPT

## 2020-09-04 ENCOUNTER — HOSPITAL ENCOUNTER (OUTPATIENT)
Dept: CARDIAC REHAB | Age: 60
Setting detail: THERAPIES SERIES
Discharge: HOME | End: 2020-09-04
Payer: COMMERCIAL

## 2020-09-04 PROCEDURE — 93798 PHYS/QHP OP CAR RHAB W/ECG: CPT

## 2020-09-07 ENCOUNTER — APPOINTMENT (OUTPATIENT)
Dept: CARDIAC REHAB | Age: 60
End: 2020-09-07
Payer: COMMERCIAL

## 2020-09-08 ENCOUNTER — TELEPHONE (OUTPATIENT)
Dept: CARDIAC REHAB | Age: 60
End: 2020-09-08

## 2020-09-08 NOTE — TELEPHONE ENCOUNTER
9/8/2020 11:59 am Nutrition: Patient cancelled appointment scheduled for 9/14/2020 at 10:00 am due to previous doctor appointment and rescheduled nutrition session to 9/21/2020 at 10:00 am. Will remain available.  Electronically signed by Winfred Burkitt, RD, CHRISTIANO on 9/8/20 at 12:00 PM EDT

## 2020-09-09 ENCOUNTER — HOSPITAL ENCOUNTER (OUTPATIENT)
Dept: CARDIAC REHAB | Age: 60
Setting detail: THERAPIES SERIES
Discharge: HOME | End: 2020-09-09
Payer: COMMERCIAL

## 2020-09-09 PROCEDURE — 93798 PHYS/QHP OP CAR RHAB W/ECG: CPT

## 2020-09-11 ENCOUNTER — HOSPITAL ENCOUNTER (OUTPATIENT)
Dept: CARDIAC REHAB | Age: 60
Setting detail: THERAPIES SERIES
Discharge: HOME | End: 2020-09-11
Payer: COMMERCIAL

## 2020-09-11 PROCEDURE — 93798 PHYS/QHP OP CAR RHAB W/ECG: CPT

## 2020-09-14 ENCOUNTER — HOSPITAL ENCOUNTER (OUTPATIENT)
Dept: CARDIAC REHAB | Age: 60
Setting detail: THERAPIES SERIES
Discharge: HOME | End: 2020-09-14
Payer: COMMERCIAL

## 2020-09-14 PROCEDURE — 93798 PHYS/QHP OP CAR RHAB W/ECG: CPT

## 2020-09-15 ENCOUNTER — TELEPHONE (OUTPATIENT)
Dept: CARDIAC REHAB | Age: 60
End: 2020-09-15

## 2020-09-15 NOTE — TELEPHONE ENCOUNTER
9/15/2020 10:41 am Nutrition: attempted to reach patient on this date by phone regarding appointment scheduled for 9/21/2020 at 10:00 am. Line busy. Will remain available.  Electronically signed by Makenzie Richmond RD, LD on 9/15/20 at 10:42 AM EDT

## 2020-09-16 ENCOUNTER — HOSPITAL ENCOUNTER (OUTPATIENT)
Dept: CARDIAC REHAB | Age: 60
Setting detail: THERAPIES SERIES
Discharge: HOME | End: 2020-09-16
Payer: COMMERCIAL

## 2020-09-16 PROCEDURE — 93798 PHYS/QHP OP CAR RHAB W/ECG: CPT

## 2020-09-18 ENCOUNTER — HOSPITAL ENCOUNTER (OUTPATIENT)
Dept: CARDIAC REHAB | Age: 60
Setting detail: THERAPIES SERIES
Discharge: HOME | End: 2020-09-18
Payer: COMMERCIAL

## 2020-09-18 PROCEDURE — 93798 PHYS/QHP OP CAR RHAB W/ECG: CPT

## 2020-09-21 ENCOUNTER — HOSPITAL ENCOUNTER (OUTPATIENT)
Dept: CARDIAC REHAB | Age: 60
Setting detail: THERAPIES SERIES
Discharge: HOME | End: 2020-09-21
Payer: COMMERCIAL

## 2020-09-21 VITALS — BODY MASS INDEX: 20.49 KG/M2 | HEIGHT: 64 IN | WEIGHT: 120 LBS

## 2020-09-21 PROCEDURE — 93798 PHYS/QHP OP CAR RHAB W/ECG: CPT

## 2020-09-21 NOTE — PROGRESS NOTES
Outpatient Dietitian Follow Up   9/21/2020    Fina Jefferson 64 y.o. female    PCP:   Rosalia Bills MD    Assessment:  Subjective data:Patient in for follow up nutrition counseling. Patient reports she is active at work, does not eat  big meals, likes to snack, can eat breakfast foods for dinner and trying to increase water consumption. Objective data:   Temperature: 97.2 degrees Farenheit. Negative for fever. Appearance:Slender frame size, nourished appearance. Goals reviewed. Patient gained one pound over one month. Weight gain not significant. Goal met and goal to continue. Patient reports she struggles with not having late night snack. Goal in progress and goal to continue. Patient reports she is eating healthy snacks like nuts or yogurt. Goal met and goal to continue. Patient is not purchasing cheese for home and is only eating if in meal. Improvement seen. Goal met and discontinued. Patient still struggles with eating pizza ( had one slice) but is not eating grain wraps. Goal in progress and goal to continue. Patient did complete 3 day food log. Patient is eating 6 times per day. Patient drinks 1 cup regular coffee daily and 3 cups herbal tea. Goal met and goal to continue. 24 hour Dietary Recall:  Breakfast: 8:00 am, At home, 1 cup regular coffee with hazelnut liquid creamer, 1 cup Mandarin Orange( had 3 total for day)  and  6 ounces regular lite and fit peach yogurt   AM snack: none  Lunch: 12:00 noon, at home, few crispy baked onion strips ( usually used on salad as topping) , 1 cup corn and  10 ounces unflavored bottled water, 1/2 cup pureed liver and gizzard stuffing  PM snack: none  Dinner: after 4:00 pm, at home, 1 whole baked thigh and leg chicken and 10 ounces unflavored water  Evening snack: 4:00 pm, at home, few Weather's hard candy and had additional pieces before bed.     Ht Readings from Last 1 Encounters:   09/21/20 5' 4\" (1.626 m)     Wt Readings from Last 3 Encounters: 09/21/20 120 lb (54.4 kg)   08/10/20 119 lb (54 kg)   07/31/20 119 lb 9.6 oz (54.3 kg)     Body mass index is 20.6 kg/m². Waist circumference:deferred  Body Fat %:deferred    Diagnosis:( reviewed)  Inadequate knowledge related to history of cardiac dysfunction and insufficient knowledge as evident by patient comments. ( update: BMI within normal limits)    Intervention:    RD 1: 1 follow up visit. Continue to meet estimated needs. Continue to try to maintain current weight within one to two pounds. Continued adequate hydration. Continued increased knowledge. Continued improved compliance. Continue to try to follow My Plate guidelines. Education provided:Patient educated on 3 day food log, serving size and Rate Your Plate    Interdisciplinary Treatment Plan (ITP) note: Patient is a phase II participant and ITP done. Monitoring/Evaluation:  RD will monitor PO intake and weight data as available. The goals set by patient are to increase food intake at breakfast ( at least fruit and yogurt), refer to my plate for meals/snacks to maintain weight, complete 3 day food record and rate your plate form by next review. The patient will be followed on October 19, 2020. Nieves Anne M.A.,R.D., L.D. Contact information: [phone].

## 2020-09-23 ENCOUNTER — HOSPITAL ENCOUNTER (OUTPATIENT)
Dept: CARDIAC REHAB | Age: 60
Setting detail: THERAPIES SERIES
Discharge: HOME | End: 2020-09-23
Payer: COMMERCIAL

## 2020-09-23 PROCEDURE — 93798 PHYS/QHP OP CAR RHAB W/ECG: CPT

## 2020-09-25 ENCOUNTER — HOSPITAL ENCOUNTER (OUTPATIENT)
Dept: CARDIAC REHAB | Age: 60
Setting detail: THERAPIES SERIES
Discharge: HOME | End: 2020-09-25
Payer: COMMERCIAL

## 2020-09-25 PROCEDURE — 93798 PHYS/QHP OP CAR RHAB W/ECG: CPT

## 2020-09-28 ENCOUNTER — HOSPITAL ENCOUNTER (OUTPATIENT)
Dept: CARDIAC REHAB | Age: 60
Setting detail: THERAPIES SERIES
Discharge: HOME | End: 2020-09-28
Payer: COMMERCIAL

## 2020-09-28 PROCEDURE — 93798 PHYS/QHP OP CAR RHAB W/ECG: CPT

## 2020-09-30 ENCOUNTER — HOSPITAL ENCOUNTER (OUTPATIENT)
Dept: CARDIAC REHAB | Age: 60
Setting detail: THERAPIES SERIES
Discharge: HOME | End: 2020-09-30
Payer: COMMERCIAL

## 2020-09-30 PROCEDURE — 93798 PHYS/QHP OP CAR RHAB W/ECG: CPT

## 2020-10-02 ENCOUNTER — HOSPITAL ENCOUNTER (OUTPATIENT)
Dept: CARDIAC REHAB | Age: 60
Setting detail: THERAPIES SERIES
Discharge: HOME | End: 2020-10-02
Payer: COMMERCIAL

## 2020-10-02 PROCEDURE — 93798 PHYS/QHP OP CAR RHAB W/ECG: CPT

## 2020-10-05 ENCOUNTER — HOSPITAL ENCOUNTER (OUTPATIENT)
Dept: CARDIAC REHAB | Age: 60
Setting detail: THERAPIES SERIES
Discharge: HOME | End: 2020-10-05
Payer: COMMERCIAL

## 2020-10-05 PROCEDURE — 93798 PHYS/QHP OP CAR RHAB W/ECG: CPT

## 2020-10-07 ENCOUNTER — HOSPITAL ENCOUNTER (OUTPATIENT)
Dept: CARDIAC REHAB | Age: 60
Setting detail: THERAPIES SERIES
Discharge: HOME | End: 2020-10-07
Payer: COMMERCIAL

## 2020-10-07 PROCEDURE — 93798 PHYS/QHP OP CAR RHAB W/ECG: CPT

## 2020-10-08 ENCOUNTER — TELEPHONE (OUTPATIENT)
Dept: CARDIOLOGY | Age: 60
End: 2020-10-08

## 2020-10-09 ENCOUNTER — HOSPITAL ENCOUNTER (OUTPATIENT)
Dept: CARDIAC REHAB | Age: 60
Setting detail: THERAPIES SERIES
Discharge: HOME | End: 2020-10-09
Payer: COMMERCIAL

## 2020-10-09 ENCOUNTER — HOSPITAL ENCOUNTER (OUTPATIENT)
Age: 60
Discharge: HOME | End: 2020-10-09
Payer: COMMERCIAL

## 2020-10-09 LAB
ALBUMIN SERPL-MCNC: 4.5 G/DL (ref 3.5–5.2)
ALP BLD-CCNC: 62 U/L (ref 35–104)
ALT SERPL-CCNC: 11 U/L (ref 0–32)
ANION GAP SERPL CALCULATED.3IONS-SCNC: 15 MMOL/L (ref 7–16)
AST SERPL-CCNC: 18 U/L (ref 0–31)
BILIRUB SERPL-MCNC: 0.8 MG/DL (ref 0–1.2)
BUN BLDV-MCNC: 19 MG/DL (ref 6–20)
CALCIUM SERPL-MCNC: 9.7 MG/DL (ref 8.6–10.2)
CHLORIDE BLD-SCNC: 104 MMOL/L (ref 98–107)
CHOLESTEROL, TOTAL: 294 MG/DL (ref 0–199)
CO2: 23 MMOL/L (ref 22–29)
CREAT SERPL-MCNC: 0.7 MG/DL (ref 0.5–1)
GFR AFRICAN AMERICAN: >60
GFR NON-AFRICAN AMERICAN: >60 ML/MIN/1.73
GLUCOSE BLD-MCNC: 89 MG/DL (ref 74–99)
HCT VFR BLD CALC: 33.4 % (ref 34–48)
HDLC SERPL-MCNC: 80 MG/DL
HEMOGLOBIN: 11.3 G/DL (ref 11.5–15.5)
LDL CHOLESTEROL CALCULATED: 197 MG/DL (ref 0–99)
MCH RBC QN AUTO: 28.9 PG (ref 26–35)
MCHC RBC AUTO-ENTMCNC: 33.8 % (ref 32–34.5)
MCV RBC AUTO: 85.4 FL (ref 80–99.9)
PDW BLD-RTO: 13.2 FL (ref 11.5–15)
PLATELET # BLD: 233 E9/L (ref 130–450)
PMV BLD AUTO: 11.5 FL (ref 7–12)
POTASSIUM SERPL-SCNC: 4 MMOL/L (ref 3.5–5)
RBC # BLD: 3.91 E12/L (ref 3.5–5.5)
SODIUM BLD-SCNC: 142 MMOL/L (ref 132–146)
T4 FREE: 1.46 NG/DL (ref 0.93–1.7)
TOTAL PROTEIN: 7.2 G/DL (ref 6.4–8.3)
TRIGL SERPL-MCNC: 85 MG/DL (ref 0–149)
TSH SERPL DL<=0.05 MIU/L-ACNC: 1.32 UIU/ML (ref 0.27–4.2)
VITAMIN B-12: 309 PG/ML (ref 211–946)
VITAMIN D 25-HYDROXY: 25 NG/ML (ref 30–100)
VLDLC SERPL CALC-MCNC: 17 MG/DL
WBC # BLD: 3.9 E9/L (ref 4.5–11.5)

## 2020-10-09 PROCEDURE — 84443 ASSAY THYROID STIM HORMONE: CPT

## 2020-10-09 PROCEDURE — 82306 VITAMIN D 25 HYDROXY: CPT

## 2020-10-09 PROCEDURE — 82607 VITAMIN B-12: CPT

## 2020-10-09 PROCEDURE — 80053 COMPREHEN METABOLIC PANEL: CPT

## 2020-10-09 PROCEDURE — 85027 COMPLETE CBC AUTOMATED: CPT

## 2020-10-09 PROCEDURE — 80061 LIPID PANEL: CPT

## 2020-10-09 PROCEDURE — 93798 PHYS/QHP OP CAR RHAB W/ECG: CPT

## 2020-10-09 PROCEDURE — 36415 COLL VENOUS BLD VENIPUNCTURE: CPT

## 2020-10-09 PROCEDURE — 84439 ASSAY OF FREE THYROXINE: CPT

## 2020-10-12 ENCOUNTER — HOSPITAL ENCOUNTER (OUTPATIENT)
Dept: CARDIAC REHAB | Age: 60
Setting detail: THERAPIES SERIES
Discharge: HOME | End: 2020-10-12
Payer: COMMERCIAL

## 2020-10-12 ENCOUNTER — HOSPITAL ENCOUNTER (OUTPATIENT)
Dept: CARDIOLOGY | Age: 60
Discharge: HOME | End: 2020-10-12
Payer: COMMERCIAL

## 2020-10-12 LAB
LV EF: 30 %
LVEF MODALITY: NORMAL

## 2020-10-12 PROCEDURE — 93306 TTE W/DOPPLER COMPLETE: CPT

## 2020-10-12 PROCEDURE — 93798 PHYS/QHP OP CAR RHAB W/ECG: CPT

## 2020-10-13 ENCOUNTER — TELEPHONE (OUTPATIENT)
Dept: CARDIAC REHAB | Age: 60
End: 2020-10-13

## 2020-10-13 NOTE — TELEPHONE ENCOUNTER
10/13/2020 10:31 am Nutrition: Spoke with patient on this date by phone confirming appointment regarding appointment on 10/19/2020 at 10:00 am. Will remain available.  Electronically signed by Torrey Naidu RD, CHRISTIANO on 10/13/20 at 10:32 AM EDT

## 2020-10-14 ENCOUNTER — HOSPITAL ENCOUNTER (OUTPATIENT)
Dept: CARDIAC REHAB | Age: 60
Setting detail: THERAPIES SERIES
Discharge: HOME | End: 2020-10-14
Payer: COMMERCIAL

## 2020-10-14 PROCEDURE — 93798 PHYS/QHP OP CAR RHAB W/ECG: CPT

## 2020-10-16 ENCOUNTER — HOSPITAL ENCOUNTER (OUTPATIENT)
Dept: CARDIAC REHAB | Age: 60
Setting detail: THERAPIES SERIES
Discharge: HOME | End: 2020-10-16
Payer: COMMERCIAL

## 2020-10-16 PROCEDURE — 93798 PHYS/QHP OP CAR RHAB W/ECG: CPT

## 2020-10-19 ENCOUNTER — HOSPITAL ENCOUNTER (OUTPATIENT)
Dept: CARDIAC REHAB | Age: 60
Setting detail: THERAPIES SERIES
Discharge: HOME | End: 2020-10-19
Payer: COMMERCIAL

## 2020-10-19 VITALS — WEIGHT: 118 LBS | BODY MASS INDEX: 20.14 KG/M2 | HEIGHT: 64 IN

## 2020-10-19 PROCEDURE — 93798 PHYS/QHP OP CAR RHAB W/ECG: CPT

## 2020-10-19 NOTE — PROGRESS NOTES
Outpatient Dietitian Follow Up/Discharge   10/19/2020    Pascual Ground 64 y.o. female    PCP:   Eileen Moon MD    Assessment:  Subjective data:Patient in for follow up nutrition counseling. 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 Negative for fever. Appearance:Patient has slender body frame and appears nourished. Goals reviewed. Patient is eating breakfast and usually has fruit, yogurt, hot decaf coffee, water and either egg whites or toast. Patient improving in consumption of food at breakfast.  Goal met and goal to continue after discharge. Patient is referring to My Plate to try to maintain weight. Patient lost 2 pounds over one month. Patient lost 1.6% weight over 1 month. Goal in progress and goal to continue after discharge. Patient did complete 3 day food record and Rate Your Plate form. Goals met and goals discontinued. 24 hour Dietary Recall:    Breakfast: 8:30 am, at home, 1 cup hot decaf instant coffee, 1 medium size banana, 16.9 ounces unflavored bottled  water, 1 cup regular gluten free yogurt  AM snack: 10:30 am, at home, 1 larger grapefruit and 3 pretzel  1 can low sodium V-8 juice  Lunch: 1:00 pm, take out, 1 cup regular gluten free yogurt, 2 ham wraps from [employer], 1 cup hot tea, 12 ounces unflavored bottled water  PM snack: 5:30 pm, at home 2 hard boiled eggs, 12 ounces unflavored bottled water  Dinner: 6:00 pm, out,  1 1/2 cups  Salad with Balsamic Vinegar dressing, 1 wedge of bread, 16 ounces tap water  Evening snack: 7:30 pm, at home, 2 hard boiled eggs with 1/3 cup low fat Western Xin dressing, 16 ounces unflavored bottled water  1 handful pretzels.        Ht Readings from Last 1 Encounters:   10/19/20 5' 4\" (1.626 m)     Wt Readings from Last 3 Encounters:   10/19/20 118 lb (53.5 kg)   09/21/20 120 lb (54.4 kg)   08/10/20 119 lb (54 kg)

## 2020-10-21 ENCOUNTER — HOSPITAL ENCOUNTER (OUTPATIENT)
Dept: CARDIAC REHAB | Age: 60
Setting detail: THERAPIES SERIES
Discharge: HOME | End: 2020-10-21
Payer: COMMERCIAL

## 2020-10-21 PROCEDURE — 93798 PHYS/QHP OP CAR RHAB W/ECG: CPT

## 2020-10-23 ENCOUNTER — HOSPITAL ENCOUNTER (OUTPATIENT)
Dept: CARDIAC REHAB | Age: 60
Setting detail: THERAPIES SERIES
Discharge: HOME | End: 2020-10-23
Payer: COMMERCIAL

## 2020-10-23 PROCEDURE — 93798 PHYS/QHP OP CAR RHAB W/ECG: CPT

## 2020-10-26 ENCOUNTER — HOSPITAL ENCOUNTER (OUTPATIENT)
Dept: CARDIAC REHAB | Age: 60
Setting detail: THERAPIES SERIES
Discharge: HOME | End: 2020-10-26
Payer: COMMERCIAL

## 2020-10-26 PROCEDURE — 93798 PHYS/QHP OP CAR RHAB W/ECG: CPT

## 2020-10-28 ENCOUNTER — HOSPITAL ENCOUNTER (OUTPATIENT)
Dept: CARDIAC REHAB | Age: 60
Setting detail: THERAPIES SERIES
Discharge: HOME | End: 2020-10-28
Payer: COMMERCIAL

## 2020-10-28 PROCEDURE — 93798 PHYS/QHP OP CAR RHAB W/ECG: CPT

## 2020-10-30 ENCOUNTER — TELEPHONE (OUTPATIENT)
Dept: CARDIOLOGY CLINIC | Age: 60
End: 2020-10-30

## 2020-10-30 ENCOUNTER — HOSPITAL ENCOUNTER (OUTPATIENT)
Dept: CARDIAC REHAB | Age: 60
Setting detail: THERAPIES SERIES
End: 2020-10-30
Payer: COMMERCIAL

## 2020-10-30 NOTE — TELEPHONE ENCOUNTER
----- Message from RAJESH Mccloud - CNP sent at 10/30/2020 11:14 AM EDT -----  TTE reviewed  Was seen by Dr. Ketan Paula today  Needs referral for CRT-D - would she like to see EP locally or is she following in [place]?     Thank you

## 2020-10-30 NOTE — RESULT ENCOUNTER NOTE
TTE reviewed  Was seen by Dr. Marilu Jameson today  Needs referral for CRT-D - would she like to see EP locally or is she following in [facility]?     Thank you

## 2020-10-30 NOTE — TELEPHONE ENCOUNTER
Spoke with  Radha Valdovinos , she would like to use [facility] as long as covered by her insurance. Order placed. And [facility].

## 2020-10-31 ENCOUNTER — CLINICAL DOCUMENTATION (OUTPATIENT)
Dept: CARDIAC REHAB | Age: 60
End: 2020-10-31

## 2020-11-02 ENCOUNTER — TELEPHONE (OUTPATIENT)
Dept: NON INVASIVE DIAGNOSTICS | Age: 60
End: 2020-11-02

## 2020-11-02 NOTE — TELEPHONE ENCOUNTER
Called and left a message to call the office back to schedule a new pt appointment for new patient to discuss possible ICD referred by Kameron Remy.    Patient in [address] [place] [address]

## 2020-11-22 NOTE — PROGRESS NOTES
[facility],[address] Floor and [facility] Electrophysiology  Consultation Report  PATIENT: Felix Greco  MEDICAL RECORD NUMBER: <[id number]>  DATE OF SERVICE:  11/23/2020  ATTENDING ELECTROPHYSIOLOGIST: Aaron Galeano MD  REFERRING PHYSICIAN: RAJESH Novak * and Mirella Allen MD  CHIEF COMPLAINT: Nonischemic cardiomyopthay    HPI: This is a 64 y.o. female with a history of   Patient Active Problem List   Diagnosis    Admitted for acute congestive heart failure ([address]    Hyperglycemia    Pleural effusion on right    Elevated blood pressure reading without diagnosis of hypertension    Hypokalemia    New onset of congestive heart failure ([address]    Abnormal EKG    [facility]    Viral cardiomyopathy ([facility]   who presents to cardiac electrophysiology clinic for consultation of nonischemic cardiomyopathy. The patient as history of HFrEF, nonischemic cardiomyopathy and LBBB. The patient was diagnosed with decompensated heart failure. Echocardiogram on 1/14/20 showed LV EF of 15% and severe MR. She underwent LHC which showed normal coronaries and LV EF of 10-15% on LV gram. She was see by advanced heart failure service and was started on GDMT including Toprol XL, Aldactone and Entresto. She was discharged home with LifeVest. She has been tolerating high-dose Entresto and spironolactone. Toprol titration limited to resting heart rate and hypotension. Repeat echocardiogram on 6/12/20 showed LV EF of 25% and on 10/12/20 showed LV EF 30%. Today she presents in [place]. The patient is currently feeling well and is able to complete her ADLs and walk up stairs without any difficulty but has to do it slowly. Currently she denies any chest pain, palpitations, dizziness, syncope, orthopnea or paroxysmal nocturnal dyspnea. Discussed with her regarding indication of CRT-D implantation. Risks, benefits and alternatives were discussed.  She denies any recent hospitalizations for CHF, her last admission was January 2020. Patient Active Problem List    Diagnosis Date Noted    Abnormal EKG     [facility]ischemic cardiomyopathy ([facility]     Viral cardiomyopathy ([facility]     New onset of congestive heart failure ([facility]     Hypokalemia     Admitted for acute congestive heart failure ([facility] 01/13/2020    Hyperglycemia 01/13/2020    Pleural effusion on right 01/13/2020    Elevated blood pressure reading without diagnosis of hypertension 01/13/2020       Past Medical History:   Diagnosis Date    [facility]) [facility])     HFrEF (heart failure with reduced ejection fraction) ([facility] 01/17/2020 1/14/20- echo- LVEF 15%, stage I DD, RVSF reduced, LA severely dilated, LBBB, severe MR, mild TR, :LVDD: 6.5, RVDD: 3.0    LBBB (left bundle branch block)     Nonischemic cardiomyopathy ([facility]        Family History   Problem Relation Age of Onset    Stroke Mother     No Known Problems Sister     No Known Problems Sister     No Known Problems Sister        Social History     Tobacco Use    Smoking status: Never Smoker    Smokeless tobacco: Never Used   Substance Use Topics    Alcohol use: Not Currently     Comment: socially wine. stopped wine with cardiac issues. Current Outpatient Medications   Medication Sig Dispense Refill    Cholecalciferol 100 MCG (4000 UT) CAPS Take 1,000 Units by mouth       bumetanide (BUMEX) 1 MG tablet Take 1 tablet by mouth daily as needed (>3# weight gain in a day, increased shortness of breath or swelling.) 90 tablet 2    metoprolol succinate (TOPROL XL) 50 MG extended release tablet Take 1 tablet by mouth daily 90 tablet 1    spironolactone (ALDACTONE) 25 MG tablet Take 1 tablet by mouth daily 90 tablet 1    sacubitril-valsartan (ENTRESTO)  MG per tablet Take 1 tablet by mouth 2 times daily 180 tablet 6    pantoprazole (PROTONIX) 40 MG tablet Take 40 mg by mouth every morning PCP manages script, via optum rx.       Acetaminophen (TYLENOL CHILDRENS PO) Take 320 mg by mouth as needed (headache.) childrens tylenol wild berry powder. 160mg per packet. Uses 2 packets as needed      albuterol sulfate HFA (PROAIR HFA) 108 (90 Base) MCG/ACT inhaler Inhale 2 puffs into the lungs every 4 hours as needed       No current facility-administered medications for this visit. No Known Allergies    ROS:   Constitutional: Negative for fever, activity change and appetite change. HENT: Negative for epistaxis. Eyes: Negative for diploplia, blurred vision. Respiratory: Negative for cough, chest tightness, shortness of breath and wheezing. Cardiovascular: pertinent positives in HPI  Gastrointestinal: Negative for abdominal pain and blood in stool. All other review of systems are negative     PHYSICAL EXAM:   Vitals:    11/23/20 0707   BP: 116/62   Pulse: 69   Resp: 18   Weight: 123 lb (55.8 kg)   Height: 5' 4\" (1.626 m)   Constitutional: Well-developed, no acute distress  Eyes: conjunctivae normal, no xanthelasma   Ears, Nose, Throat: oral mucosa moist, no cyanosis   CV: no JVD. Regular rate and rhythm. Normal S1S2 and no S3. No murmurs, rubs, or gallops. PMI is nondisplaced  Lungs: clear to auscultation bilaterally, normal respiratory effort without used of accessory muscles  Abdomen: soft, non-tender, bowel sounds present, no masses or hepatomegaly   Musculoskeletal: no digital clubbing, no edema   Skin: warm, no rashes     I have personally reviewed the laboratory, cardiac diagnostic and radiographic testing as outlined below:    Data:    No results for input(s): WBC, HGB, HCT, PLT in the last 72 hours. No results for input(s): NA, K, CL, CO2, BUN, CREATININE, CALCIUM in the last 72 hours. Invalid input(s): GLU, MAGNESIUM   No results found for: MG  No results for input(s): TSH in the last 72 hours. No results for input(s): INR in the last 72 hours. CXR 1/13/20:   Findings: There is a suggestion of bilateral pleural effusions         The heart is enlarged. The lung fields demonstrate evidence for airspace disease. The aorta is tortuous and ectatic.              Impression    Tortuous ectatic aorta    Cardiomegaly    Airspace disease compatible with atelectasis or pneumonia, at both    lung bases, likely with associated pleural effusion                       EK20: SR w/ LBBB/IVCD, rate: 69 bpm,  ms  - Please see scan in Cardiology. Echocardiogram 10/12/20:    Findings      Left Ventricle   Mild left ventricular dilatation. Severe global hypokinesis, abnormal septal motion, EF estimated about 30   +/-3%. Stage I diastolic dysfunction. No wall motion abnormalities. Right Ventricle   Normal right ventricle size and function. Left Atrium   Left atrium is of normal size. Interatrial septum not well visualized but appears intact. Right Atrium   Normal right atrium. Mitral Valve   Normal mitral valve structure. There is mild to moderate regurgitation - ERO 0.2cm2, PISA 0.7cm, RV 29cc. No mitral valve prolapse. Tricuspid Valve   Normal tricuspid valve structure. There is mild tricuspid regurgitation, RVSP 18mmHg. Aortic Valve   Normal aortic valve structure and function. No hemodynamically significant aortic stenosis is present. Pulmonic Valve   The pulmonic valve was not well visualized. Pericardial Effusion   No evidence of pericardial effusion. Pericardium appears normal.      Pleural Effusion   No evidence of pleural effusion. Aorta   Normal aortic root size and ascending aorta. Miscellaneous   The inferior vena cava diameter is normal with normal respiratory   variation. Conclusions      Summary   Mild left ventricular dilatation. Severe global hypokinesis, abnormal septal motion, EF estimated about 30   +/-3%. Stage I diastolic dysfunction. Interatrial septum not well visualized but appears intact. Normal right ventricle size and function.    There is mild to moderate regurgitation - ERO 0.2cm2, PISA 0.7cm, RV 29cc. No mitral valve prolapse. No hemodynamically significant aortic stenosis is present. There is mild tricuspid regurgitation, RVSP 18mmHg. Normal aortic root size. No evidence of pericardial effusion. No intra cardiac mass or thrombus. Compared to prior echo from 6/12/20 which showed EF 25%.       Signature      ----------------------------------------------------------------   Electronically signed by Meagan Emery MD(Interpreting   physician) on 10/13/2020 12:20 PM   ----------------------------------------------------------------     M-Mode/2D Measurements & Calculations      LV Diastolic    LV Systolic Dimension: 4.6   AV Cusp Separation: 1.4 cmLA   Dimension: 5.5  cm                           Dimension: 2.8 cmAO Root   cm              LV Volume Diastolic: 524.1   Dimension: 2.8 cm   LV FS:16.4 %    ml   LV PW           LV Volume Systolic: 01.1 ml   Diastolic: 1 cm LV EDV/LV EDV Index: 148.5   Septum          ml/95 ml/m^2LV ESV/LV ESV    RV Diastolic Dimension: 2 cm   Diastolic: 0.9  Index: 04.3 ml/63ml/ m^2   cm              EF Calculated: 33.5 %                   LV Mass Index: 127 l/min*m^2 LA volume/Index: 39.3 ml   LV Mass: 199.32 LV Length: 8.6 cm            RA Area: 9.7 cm^2   g                   LVOT: 2.3 cm     Doppler Measurements & Calculations      MV Peak E-Wave: 0.62 m/s   AV Peak Velocity:    LVOT Peak Velocity: 0.95   MV Peak A-Wave: 0.85 m/s   1.47 m/s             m/s   MV E/A Ratio: 0.73         AV Peak Gradient:    LVOT Mean Velocity: 0.56   MV Peak Gradient: 2.2 mmHg 8.58 mmHg            m/s   MV Mean Gradient: 0.7 mmHg AV Mean Velocity:    LVOT Peak Gradient: 3.6   MV Mean Velocity: 0.41 m/s 0.95 m/s             mmHgLVOT Mean Gradient:   MV Deceleration Time:      AV Mean Gradient:    1.5 mmHg   235.7 msec                 3.9 mmHg             Estimated RVSP: 17.9 mmHg   MV P1/2t: 55.1 msec        AV VTI: 27.3 cm Estimated RAP:3 mmHg   MVA by PHT:3.99 cm^2       AV Area   MV Area (continuity): 2.2  (Continuity):2.49   cm^2                       cm^2                 TR Velocity:1.93 m/s                                                   TR Gradient:14.85 mmHg                              LVOT VTI: 16.4 cm    PV Peak Velocity: 0.8 m/s   MR Velocity: 3.69 m/s                           PV Peak Gradient: 2.55   MV REUBEN PISA: 0.21 cm^2     Estimated PASP:      mmHg   MR VTI: 138.6 cm           17.85 mmHg           PV Mean Velocity: 0.54 m/s   Alias Velocity: 0.25                            PV Mean Gradient: 1.3 mmHg   m/sPISA Radius: 0.7 cm      PISA area: 3.08 cm^2MR   flow rate: 78.23 ml/sMR   volume:29.11 ml    Cardiac Catheterization 1/16/20:   INDICATION:  Severe LV dysfunction with severe mitral regurgitation.     PROCEDURE NOTE:  The patient was brought to the cardiac cath lab in her  usual fasting state. Under sterile condition and under local anesthetic  and using conscious sedation, I failed to access the small right radial  artery. Then, a 5-Nauruan micropuncture kit was used and a 5-Nauruan  sheath was inserted into the right common femoral artery. Then, a  5-Nauruan JL3.5 diagnostic catheter was advanced to the ascending aorta  and the left main coronary artery was engaged and a coronary angiogram  was done. This catheter was exchanged over a guidewire to a 5-Nauruan  JR4 diagnostic catheter which was able to engage the right coronary  artery and a coronary angiogram was done. This catheter was exchanged  over a guidewire to a 5-Nauruan pigtail which was advanced into the left  ventricle without difficulty. The left ventricular end-diastolic  pressure was measured. Left ventriculogram was done. There was no  significant gradient across the aortic valve. At the end of the  procedure, the catheter was pulled out.   The right groin sheath was  pulled out and manual pressure was applied over the right groin with  good hemostasis. The patient tolerated the procedure very well and no  complications were noted. No significant bleeding occurred during the  procedure.     FINDINGS:  HEMODYNAMICS:  1. Aortic pressure 104/85 mmHg. 2.  Left ventricular end-diastolic pressure 37 mmHg.     CORONARY ANATOMY:  1. Left main:  It is medium in size with no angiographic stenosis  noted. 2.  LAD:  It is large in caliber and wraps around the apex and gives  rise to one diagonal branch. No angiographic stenosis noted. 3.  Ramus: It is a very small branch. 4.  Left circumflex: It is fair in size and giving rise to a large  bifurcating obtuse marginal branch, then continues to AV groove in three  or four small branches. No angiographic stenosis noted. 5.  RCA:  It is large and dominant giving rise to a conus branch, an SA  jamar or AV jamar branch, an RV marginal branch, then a PDA and PLV  branch. No angiographic stenosis noted.     Left ventriculogram revealed significantly dilated left ventricle with  an ejection fraction of 10% to 15%. There was severe mitral  regurgitation noted.     IMPRESSION:  1. Absence of angiographic coronary stenosis. 2.  Significantly elevated left ventricular end-diastolic pressure at 37  mmHg. 3.  Dilated left ventricle with an ejection fraction of 10% to 15%. 4.  Severe mitral regurgitation.     RECOMMENDATIONS:  As per Dr Salina Travis.     PROCEDURE/SEDATION START TIME:  3584.     PROCEDURE END TIME:  1605.     CONTRAST VOLUME:  50 mL of Optiray.     FLUOROSCOPY TIME:  2.0 minutes.     CONSCIOUS SEDATION:  50 mcg of intravenous fentanyl.           Salvador Tapia MD    I have independently reviewed all of the ECGs and rhythm strips per above     Assessment/Plan:  This is a 64 y.o. female with a history of   Patient Active Problem List   Diagnosis    Admitted for acute congestive heart failure ([address]    Hyperglycemia    Pleural effusion on right    Elevated blood pressure reading without diagnosis of hypertension    Hypokalemia    New onset of congestive heart failure (HCC)    Abnormal EKG    Nonischemic cardiomyopathy (HCC)    [facility]al [facility] ([facility])    who presents with nonischemic cardiomyopathy . 1. Nonischemic cardiomyopathy and HFrEF  - Diagnosed 1/14/20.  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  - LV EF 15% on TTE on 1/14/20.  - LV EF 10-15% on LV gram on 1/16/20.  - LV EF 25% on TTE on 6/12/20.  - LV EF 30% on TTE on 10/12/20.  - On GDMT including Toprol XL, Entresto and Aldactone  - NYHA class II and ACC stage C  - Euvolemic and compensated. - EKG showed LBBB/IVCD with  msec. - She would likely benefit from CRT-D for primary prophylaxis of SCD and treatment of CHF. - During the visit, the patient was provided a copy of \"A decision aid for Implantable Cardiac Defibrillators (ICD): For patient's with heart failure considering an ICD who are at risk for sudden cardiac death(primary prevention). \"  The patient participated in shared decision-making for ICD implantation. The procedure was described in detail. The risks, benefits, and alternatives to CRT-D implantation and possible defibrillation threshold testing were discussed with the patient. These risks include but are not limited to bleeding, infection, blood clot, pneumothorax, hemothorax, cardiac valve damage, cardiac perforation and tamponade required emergent thoracotomy, contrast induced nephropathy leading to short or even long term dialysis, vascular injury requiring emergent surgical repair, lead dislodgement required reposition, stroke and even death. The patient understands these risks and wishes to proceed with CRT-D implantation and possible defibrillation threshold testing. 2. LBBB/IVCD  - Chronic  -  ms.     3. Mitral regurgitation  - Secondary MR.  - Severe MR on TTE 1/14/20.  - Mild to moderate MR on TTE 6/12/20.  - Mild to moderate MR on TTE 10/12/20.    4. Vitamin D deficiency  - On Cholecalciferol. Recommendations:  1. CRT-D implantation for primary prophylaxis of SCD and treatment of CHF. 2. Continue WCD until CRT-D insertion. 3. No changes in medications at this time. 4. Follow up after the procedure. Encouraged the patient to call the office for any questions or concerns. I have spent a total of 60 minutes with the patient and the family reviewing the above stated recommendations. And a total of >50% of that time involved face-to-face time providing counseling and or coordination of care with the other providers. Thank you for allowing me to participate in your patient's care. Please call me if there are any questions or concerns.       Meghana Guzman MD  Cardiac Electrophysiology  [address]  [facility] [place]  7:24 AM  11/23/2020

## 2020-11-23 ENCOUNTER — OFFICE VISIT (OUTPATIENT)
Dept: NON INVASIVE DIAGNOSTICS | Age: 60
End: 2020-11-23
Payer: COMMERCIAL

## 2020-11-23 VITALS
RESPIRATION RATE: 18 BRPM | BODY MASS INDEX: 21 KG/M2 | SYSTOLIC BLOOD PRESSURE: 116 MMHG | HEART RATE: 69 BPM | DIASTOLIC BLOOD PRESSURE: 62 MMHG | WEIGHT: 123 LBS | HEIGHT: 64 IN

## 2020-11-23 PROCEDURE — G8427 DOCREV CUR MEDS BY ELIG CLIN: HCPCS | Performed by: INTERNAL MEDICINE

## 2020-11-23 PROCEDURE — 93000 ELECTROCARDIOGRAM COMPLETE: CPT | Performed by: INTERNAL MEDICINE

## 2020-11-23 PROCEDURE — 1036F TOBACCO NON-USER: CPT | Performed by: INTERNAL MEDICINE

## 2020-11-23 PROCEDURE — G8484 FLU IMMUNIZE NO ADMIN: HCPCS | Performed by: INTERNAL MEDICINE

## 2020-11-23 PROCEDURE — G8420 CALC BMI NORM PARAMETERS: HCPCS | Performed by: INTERNAL MEDICINE

## 2020-11-23 PROCEDURE — 3017F COLORECTAL CA SCREEN DOC REV: CPT | Performed by: INTERNAL MEDICINE

## 2020-11-23 PROCEDURE — 99205 OFFICE O/P NEW HI 60 MIN: CPT | Performed by: INTERNAL MEDICINE

## 2020-11-24 ENCOUNTER — TELEPHONE (OUTPATIENT)
Dept: NON INVASIVE DIAGNOSTICS | Age: 60
End: 2020-11-24

## 2020-11-24 NOTE — TELEPHONE ENCOUNTER
----- Message from Don Howell MD sent at 11/23/2020  7:22 AM EST -----  Please schedule for CRT-D. She will follow up after the procedure. Thank you.

## 2020-12-04 ENCOUNTER — TELEPHONE (OUTPATIENT)
Dept: CARDIOLOGY CLINIC | Age: 60
End: 2020-12-04

## 2020-12-04 NOTE — TELEPHONE ENCOUNTER
Patient says she dropped off Entresto assistance form Monday 11/30/2020  at [facility]. Updated Zach Field CNP is not in office this week, Jak Barker CNP will return to office next Monday to complete form. Ralph Thomas, please f/u on giving the paperwork to Umu Guillermo Monday please 12 7 2020. Giovanni Chun will call office Tuesday for f/u RE entresto assistance. Says she has a lot of the entresto tabs (got a 90 day supply, so she is good). She has not established yet with DR Sarkis Edmonds. Last seen by Jak Barker CNP July 2020.   Established with advanced HF provider  [facility] in [place] (he did not prescribe the med so he deferred Giovanni Chun back to [facility] for [place] paperwork per Patient)   Established with DR Po Alexander (CRT-D implant for primary prophylaxis of SCD and treatment of CHF.)    [id number]

## 2020-12-14 DIAGNOSIS — Z01.818 PRE-OP TESTING: ICD-10-CM

## 2020-12-16 LAB
SARS-COV-2: NOT DETECTED
SOURCE: NORMAL

## 2020-12-18 ENCOUNTER — ANESTHESIA EVENT (OUTPATIENT)
Dept: CARDIAC CATH/INVASIVE PROCEDURES | Age: 60
End: 2020-12-18

## 2020-12-18 ENCOUNTER — HOSPITAL ENCOUNTER (OUTPATIENT)
Dept: CARDIAC CATH/INVASIVE PROCEDURES | Age: 60
Setting detail: OBSERVATION
Discharge: HOME | End: 2020-12-19
Attending: INTERNAL MEDICINE | Admitting: INTERNAL MEDICINE
Payer: COMMERCIAL

## 2020-12-18 ENCOUNTER — ANESTHESIA (OUTPATIENT)
Dept: CARDIAC CATH/INVASIVE PROCEDURES | Age: 60
End: 2020-12-18

## 2020-12-18 ENCOUNTER — HOSPITAL ENCOUNTER (OUTPATIENT)
Dept: GENERAL RADIOLOGY | Age: 60
Discharge: HOME | End: 2020-12-20
Attending: INTERNAL MEDICINE
Payer: COMMERCIAL

## 2020-12-18 VITALS — OXYGEN SATURATION: 100 % | DIASTOLIC BLOOD PRESSURE: 69 MMHG | SYSTOLIC BLOOD PRESSURE: 113 MMHG

## 2020-12-18 PROBLEM — Z95.810 HISTORY OF IMPLANTABLE CARDIOVERTER-DEFIBRILLATOR (ICD) PLACEMENT: Status: ACTIVE | Noted: 2020-12-18

## 2020-12-18 LAB
ANION GAP SERPL CALCULATED.3IONS-SCNC: 8 MMOL/L (ref 7–16)
BASOPHILS ABSOLUTE: 0.02 E9/L (ref 0–0.2)
BASOPHILS RELATIVE PERCENT: 0.4 % (ref 0–2)
BUN BLDV-MCNC: 14 MG/DL (ref 6–20)
CALCIUM SERPL-MCNC: 8.3 MG/DL (ref 8.6–10.2)
CHLORIDE BLD-SCNC: 109 MMOL/L (ref 98–107)
CO2: 24 MMOL/L (ref 22–29)
CREAT SERPL-MCNC: 0.6 MG/DL (ref 0.5–1)
EOSINOPHILS ABSOLUTE: 0.06 E9/L (ref 0.05–0.5)
EOSINOPHILS RELATIVE PERCENT: 1.3 % (ref 0–6)
GFR AFRICAN AMERICAN: >60
GFR NON-AFRICAN AMERICAN: >60 ML/MIN/1.73
GLUCOSE BLD-MCNC: 83 MG/DL (ref 74–99)
HCT VFR BLD CALC: 38 % (ref 34–48)
HEMOGLOBIN: 12.6 G/DL (ref 11.5–15.5)
IMMATURE GRANULOCYTES #: 0.02 E9/L
IMMATURE GRANULOCYTES %: 0.4 % (ref 0–5)
LYMPHOCYTES ABSOLUTE: 1.07 E9/L (ref 1.5–4)
LYMPHOCYTES RELATIVE PERCENT: 23.7 % (ref 20–42)
MCH RBC QN AUTO: 28.6 PG (ref 26–35)
MCHC RBC AUTO-ENTMCNC: 33.2 % (ref 32–34.5)
MCV RBC AUTO: 86.2 FL (ref 80–99.9)
MONOCYTES ABSOLUTE: 0.22 E9/L (ref 0.1–0.95)
MONOCYTES RELATIVE PERCENT: 4.9 % (ref 2–12)
NEUTROPHILS ABSOLUTE: 3.12 E9/L (ref 1.8–7.3)
NEUTROPHILS RELATIVE PERCENT: 69.3 % (ref 43–80)
PDW BLD-RTO: 13.2 FL (ref 11.5–15)
PLATELET # BLD: 226 E9/L (ref 130–450)
PMV BLD AUTO: 11 FL (ref 7–12)
POTASSIUM SERPL-SCNC: 3.8 MMOL/L (ref 3.5–5)
RBC # BLD: 4.41 E12/L (ref 3.5–5.5)
REASON FOR REJECTION: NORMAL
REJECTED TEST: NORMAL
SODIUM BLD-SCNC: 141 MMOL/L (ref 132–146)
WBC # BLD: 4.5 E9/L (ref 4.5–11.5)

## 2020-12-18 PROCEDURE — 2580000003 HC RX 258

## 2020-12-18 PROCEDURE — 99024 POSTOP FOLLOW-UP VISIT: CPT | Performed by: INTERNAL MEDICINE

## 2020-12-18 PROCEDURE — 6360000002 HC RX W HCPCS: Performed by: INTERNAL MEDICINE

## 2020-12-18 PROCEDURE — 2500000003 HC RX 250 WO HCPCS

## 2020-12-18 PROCEDURE — 93005 ELECTROCARDIOGRAM TRACING: CPT | Performed by: INTERNAL MEDICINE

## 2020-12-18 PROCEDURE — 96374 THER/PROPH/DIAG INJ IV PUSH: CPT

## 2020-12-18 PROCEDURE — 33225 L VENTRIC PACING LEAD ADD-ON: CPT | Performed by: INTERNAL MEDICINE

## 2020-12-18 PROCEDURE — 33249 INSJ/RPLCMT DEFIB W/LEAD(S): CPT | Performed by: INTERNAL MEDICINE

## 2020-12-18 PROCEDURE — 2580000003 HC RX 258: Performed by: INTERNAL MEDICINE

## 2020-12-18 PROCEDURE — 6370000000 HC RX 637 (ALT 250 FOR IP): Performed by: STUDENT IN AN ORGANIZED HEALTH CARE EDUCATION/TRAINING PROGRAM

## 2020-12-18 PROCEDURE — 6360000002 HC RX W HCPCS

## 2020-12-18 PROCEDURE — 80048 BASIC METABOLIC PNL TOTAL CA: CPT

## 2020-12-18 PROCEDURE — C1900 LEAD, CORONARY VENOUS: HCPCS

## 2020-12-18 PROCEDURE — C1887 CATHETER, GUIDING: HCPCS

## 2020-12-18 PROCEDURE — 2709999900 HC NON-CHARGEABLE SUPPLY

## 2020-12-18 PROCEDURE — C1730 CATH, EP, 19 OR FEW ELECT: HCPCS

## 2020-12-18 PROCEDURE — C1777 LEAD, AICD, ENDO SINGLE COIL: HCPCS

## 2020-12-18 PROCEDURE — C1882 AICD, OTHER THAN SING/DUAL: HCPCS

## 2020-12-18 PROCEDURE — 36415 COLL VENOUS BLD VENIPUNCTURE: CPT

## 2020-12-18 PROCEDURE — C1894 INTRO/SHEATH, NON-LASER: HCPCS

## 2020-12-18 PROCEDURE — C1769 GUIDE WIRE: HCPCS

## 2020-12-18 PROCEDURE — 3700000001 HC ADD 15 MINUTES (ANESTHESIA)

## 2020-12-18 PROCEDURE — 3700000000 HC ANESTHESIA ATTENDED CARE

## 2020-12-18 PROCEDURE — G0379 DIRECT REFER HOSPITAL OBSERV: HCPCS

## 2020-12-18 PROCEDURE — 85025 COMPLETE CBC W/AUTO DIFF WBC: CPT

## 2020-12-18 PROCEDURE — G0378 HOSPITAL OBSERVATION PER HR: HCPCS

## 2020-12-18 PROCEDURE — C1898 LEAD, PMKR, OTHER THAN TRANS: HCPCS

## 2020-12-18 PROCEDURE — 71045 X-RAY EXAM CHEST 1 VIEW: CPT

## 2020-12-18 RX ORDER — METOPROLOL SUCCINATE 50 MG/1
50 TABLET, EXTENDED RELEASE ORAL DAILY
Status: DISCONTINUED | OUTPATIENT
Start: 2020-12-19 | End: 2020-12-19 | Stop reason: HOSPADM

## 2020-12-18 RX ORDER — PROPOFOL 10 MG/ML
INJECTION, EMULSION INTRAVENOUS CONTINUOUS PRN
Status: DISCONTINUED | OUTPATIENT
Start: 2020-12-18 | End: 2020-12-18 | Stop reason: SDUPTHER

## 2020-12-18 RX ORDER — ACETAMINOPHEN 325 MG/1
650 TABLET ORAL EVERY 4 HOURS PRN
Status: DISCONTINUED | OUTPATIENT
Start: 2020-12-18 | End: 2020-12-19 | Stop reason: HOSPADM

## 2020-12-18 RX ORDER — VITAMIN B COMPLEX
1000 TABLET ORAL DAILY
Status: DISCONTINUED | OUTPATIENT
Start: 2020-12-19 | End: 2020-12-19 | Stop reason: HOSPADM

## 2020-12-18 RX ORDER — SODIUM CHLORIDE 0.9 % (FLUSH) 0.9 %
10 SYRINGE (ML) INJECTION EVERY 12 HOURS SCHEDULED
Status: DISCONTINUED | OUTPATIENT
Start: 2020-12-18 | End: 2020-12-19 | Stop reason: HOSPADM

## 2020-12-18 RX ORDER — MIDAZOLAM HYDROCHLORIDE 1 MG/ML
INJECTION INTRAMUSCULAR; INTRAVENOUS PRN
Status: DISCONTINUED | OUTPATIENT
Start: 2020-12-18 | End: 2020-12-18 | Stop reason: SDUPTHER

## 2020-12-18 RX ORDER — SODIUM CHLORIDE 9 MG/ML
INJECTION, SOLUTION INTRAVENOUS CONTINUOUS PRN
Status: DISCONTINUED | OUTPATIENT
Start: 2020-12-18 | End: 2020-12-18 | Stop reason: SDUPTHER

## 2020-12-18 RX ORDER — FENTANYL CITRATE 50 UG/ML
INJECTION, SOLUTION INTRAMUSCULAR; INTRAVENOUS PRN
Status: DISCONTINUED | OUTPATIENT
Start: 2020-12-18 | End: 2020-12-18 | Stop reason: SDUPTHER

## 2020-12-18 RX ORDER — SODIUM CHLORIDE 0.9 % (FLUSH) 0.9 %
10 SYRINGE (ML) INJECTION PRN
Status: DISCONTINUED | OUTPATIENT
Start: 2020-12-18 | End: 2020-12-19 | Stop reason: HOSPADM

## 2020-12-18 RX ORDER — CEFAZOLIN SODIUM 1 G/3ML
INJECTION, POWDER, FOR SOLUTION INTRAMUSCULAR; INTRAVENOUS PRN
Status: DISCONTINUED | OUTPATIENT
Start: 2020-12-18 | End: 2020-12-18 | Stop reason: SDUPTHER

## 2020-12-18 RX ORDER — ALBUTEROL SULFATE 90 UG/1
2 AEROSOL, METERED RESPIRATORY (INHALATION) EVERY 6 HOURS PRN
Status: DISCONTINUED | OUTPATIENT
Start: 2020-12-18 | End: 2020-12-19 | Stop reason: HOSPADM

## 2020-12-18 RX ORDER — PANTOPRAZOLE SODIUM 40 MG/1
40 TABLET, DELAYED RELEASE ORAL
Status: DISCONTINUED | OUTPATIENT
Start: 2020-12-19 | End: 2020-12-19 | Stop reason: HOSPADM

## 2020-12-18 RX ORDER — EPHEDRINE SULFATE/0.9% NACL/PF 50 MG/5 ML
SYRINGE (ML) INTRAVENOUS PRN
Status: DISCONTINUED | OUTPATIENT
Start: 2020-12-18 | End: 2020-12-18 | Stop reason: SDUPTHER

## 2020-12-18 RX ADMIN — FENTANYL CITRATE 50 MCG: 50 INJECTION, SOLUTION INTRAMUSCULAR; INTRAVENOUS at 15:16

## 2020-12-18 RX ADMIN — SODIUM CHLORIDE, PRESERVATIVE FREE 10 ML: 5 INJECTION INTRAVENOUS at 22:33

## 2020-12-18 RX ADMIN — Medication 10 MG: at 14:36

## 2020-12-18 RX ADMIN — SODIUM CHLORIDE: 9 INJECTION, SOLUTION INTRAVENOUS at 15:26

## 2020-12-18 RX ADMIN — Medication 10 MG: at 14:19

## 2020-12-18 RX ADMIN — SODIUM CHLORIDE: 9 INJECTION, SOLUTION INTRAVENOUS at 13:58

## 2020-12-18 RX ADMIN — FENTANYL CITRATE 25 MCG: 50 INJECTION, SOLUTION INTRAMUSCULAR; INTRAVENOUS at 15:26

## 2020-12-18 RX ADMIN — PROPOFOL 35 MCG/KG/MIN: 10 INJECTION, EMULSION INTRAVENOUS at 14:08

## 2020-12-18 RX ADMIN — MIDAZOLAM 2 MG: 1 INJECTION INTRAMUSCULAR; INTRAVENOUS at 14:56

## 2020-12-18 RX ADMIN — CEFAZOLIN 2000 MG: 1 INJECTION, POWDER, FOR SOLUTION INTRAMUSCULAR; INTRAVENOUS at 14:08

## 2020-12-18 RX ADMIN — FENTANYL CITRATE 50 MCG: 50 INJECTION, SOLUTION INTRAMUSCULAR; INTRAVENOUS at 14:08

## 2020-12-18 RX ADMIN — FENTANYL CITRATE 25 MCG: 50 INJECTION, SOLUTION INTRAMUSCULAR; INTRAVENOUS at 14:29

## 2020-12-18 RX ADMIN — FENTANYL CITRATE 25 MCG: 50 INJECTION, SOLUTION INTRAMUSCULAR; INTRAVENOUS at 15:38

## 2020-12-18 RX ADMIN — MIDAZOLAM 2 MG: 1 INJECTION INTRAMUSCULAR; INTRAVENOUS at 13:58

## 2020-12-18 RX ADMIN — Medication 2 G: at 22:33

## 2020-12-18 RX ADMIN — FENTANYL CITRATE 25 MCG: 50 INJECTION, SOLUTION INTRAMUSCULAR; INTRAVENOUS at 14:23

## 2020-12-18 RX ADMIN — ACETAMINOPHEN 650 MG: 325 TABLET ORAL at 22:32

## 2020-12-18 ASSESSMENT — PULMONARY FUNCTION TESTS
PIF_VALUE: 12
PIF_VALUE: 1
PIF_VALUE: 12
PIF_VALUE: 13
PIF_VALUE: 12
PIF_VALUE: 1
PIF_VALUE: 12
PIF_VALUE: 13
PIF_VALUE: 12
PIF_VALUE: 13
PIF_VALUE: 13
PIF_VALUE: 12
PIF_VALUE: 0
PIF_VALUE: 12
PIF_VALUE: 12
PIF_VALUE: 13
PIF_VALUE: 12
PIF_VALUE: 13
PIF_VALUE: 13
PIF_VALUE: 12
PIF_VALUE: 1
PIF_VALUE: 12
PIF_VALUE: 12
PIF_VALUE: 17
PIF_VALUE: 12
PIF_VALUE: 1
PIF_VALUE: 12
PIF_VALUE: 13
PIF_VALUE: 13
PIF_VALUE: 12
PIF_VALUE: 13
PIF_VALUE: 12
PIF_VALUE: 13
PIF_VALUE: 12
PIF_VALUE: 0
PIF_VALUE: 12
PIF_VALUE: 13
PIF_VALUE: 12
PIF_VALUE: 13
PIF_VALUE: 1
PIF_VALUE: 12
PIF_VALUE: 13
PIF_VALUE: 0
PIF_VALUE: 12
PIF_VALUE: 0
PIF_VALUE: 12
PIF_VALUE: 12
PIF_VALUE: 0
PIF_VALUE: 12
PIF_VALUE: 1
PIF_VALUE: 12
PIF_VALUE: 12
PIF_VALUE: 0
PIF_VALUE: 12
PIF_VALUE: 1
PIF_VALUE: 12
PIF_VALUE: 13

## 2020-12-18 ASSESSMENT — PAIN DESCRIPTION - ORIENTATION
ORIENTATION: LEFT
ORIENTATION: LEFT

## 2020-12-18 ASSESSMENT — PAIN DESCRIPTION - LOCATION
LOCATION: CHEST
LOCATION: CHEST

## 2020-12-18 ASSESSMENT — PAIN DESCRIPTION - PAIN TYPE
TYPE: ACUTE PAIN
TYPE: ACUTE PAIN

## 2020-12-18 ASSESSMENT — PAIN SCALES - GENERAL
PAINLEVEL_OUTOF10: 9
PAINLEVEL_OUTOF10: 3
PAINLEVEL_OUTOF10: 5

## 2020-12-18 NOTE — ANESTHESIA PRE PROCEDURE
Department of Anesthesiology  Preprocedure Note       Name:  Zuleima Sims   Age:  64 y.o.  :  1960                                          MRN:  [id number]         Date:  2020      Surgeon: * Surgery not found *    Procedure:     Medications prior to admission:   Prior to Admission medications    Medication Sig Start Date End Date Taking? Authorizing Provider   Cholecalciferol 100 MCG (4000 UT) CAPS Take 1,000 Units by mouth     Historical Provider, MD   bumetanide (BUMEX) 1 MG tablet Take 1 tablet by mouth daily as needed (>3# weight gain in a day, increased shortness of breath or swelling.) 20   RAJESH Coffey CNP   metoprolol succinate (TOPROL XL) 50 MG extended release tablet Take 1 tablet by mouth daily 20   RAJESH Coffey - CNP   spironolactone (ALDACTONE) 25 MG tablet Take 1 tablet by mouth daily 20   RAJESH Coffey CNP   sacubitril-valsartan (ENTRESTO)  MG per tablet Take 1 tablet by mouth 2 times daily 7/10/20   RAJESH Coffey CNP   pantoprazole (PROTONIX) 40 MG tablet Take 40 mg by mouth every morning PCP manages script, via optum rx. Historical Provider, MD   Acetaminophen (TYLENOL CHILDRENS PO) Take 320 mg by mouth as needed (headache.) childrens tylenol wild berry powder. 160mg per packet.  Uses 2 packets as needed    Historical Provider, MD   albuterol sulfate HFA (PROAIR HFA) 108 (90 Base) MCG/ACT inhaler Inhale 2 puffs into the lungs every 4 hours as needed    Historical Provider, MD       Current medications:    Current Outpatient Medications   Medication Sig Dispense Refill    Cholecalciferol 100 MCG (4000 UT) CAPS Take 1,000 Units by mouth       bumetanide (BUMEX) 1 MG tablet Take 1 tablet by mouth daily as needed (>3# weight gain in a day, increased shortness of breath or swelling.) 90 tablet 2    metoprolol succinate (TOPROL XL) 50 MG extended release tablet Take 1 tablet by mouth daily 90 tablet 1  spironolactone (ALDACTONE) 25 MG tablet Take 1 tablet by mouth daily 90 tablet 1    sacubitril-valsartan (ENTRESTO)  MG per tablet Take 1 tablet by mouth 2 times daily 180 tablet 6    pantoprazole (PROTONIX) 40 MG tablet Take 40 mg by mouth every morning PCP manages script, via optum rx.  Acetaminophen (TYLENOL CHILDRENS PO) Take 320 mg by mouth as needed (headache.) childrens tylenol wild berry powder. 160mg per packet. Uses 2 packets as needed      albuterol sulfate HFA (PROAIR HFA) 108 (90 Base) MCG/ACT inhaler Inhale 2 puffs into the lungs every 4 hours as needed       No current facility-administered medications for this encounter. Allergies:  No Known Allergies    Problem List:    Patient Active Problem List   Diagnosis Code    Admitted for acute congestive heart failure (HCC) I50.9    Hyperglycemia R73.9    Pleural effusion on right J90    Elevated blood pressure reading without diagnosis of hypertension R03.0    Hypokalemia E87.6    New onset of congestive heart failure (HCC) I50.9    Abnormal EKG R94.31    Nonischemic cardiomyopathy ([facility]) I42.8    Viral cardiomyopathy ([facility]) B33.24       Past Medical History:        Diagnosis Date    CHF (congestive heart failure) ([facility])     [facility] 01/17/2020 1/14/20- echo- LVEF 15%, stage I DD, RVSF reduced, LA severely dilated, LBBB, severe MR, mild TR, :LVDD: 6.5, RVDD: 3.0    LBBB (left bundle branch block)     Nonischemic [facility] ([facility]        Past Surgical History:        Procedure Laterality Date    CARDIAC CATHETERIZATION  01/16/2020    DR Eran Asencio COLONOSCOPY         Social History:    Social History     Tobacco Use    Smoking status: Never Smoker    Smokeless tobacco: Never Used   Substance Use Topics    Alcohol use: Not Currently     Comment: socially wine. stopped wine with cardiac issues.                                  Counseling given: Not Answered Cardiovascular:    (+) CHF:,         Rhythm: regular  Rate: normal                 ROS comment:  Mild left ventricular dilatation. Severe global hypokinesis, abnormal septal motion, EF estimated about 30   +/-3%. Stage I diastolic dysfunction. Interatrial septum not well visualized but appears intact. Normal right ventricle size and function. There is mild to moderate regurgitation - ERO 0.2cm2, PISA 0.7cm, RV 29cc. No mitral valve prolapse. No hemodynamically significant aortic stenosis is present. There is mild tricuspid regurgitation, RVSP 18mmHg. Normal aortic root size. No evidence of pericardial effusion. No intra cardiac mass or thrombus. Compared to prior echo from 6/12/20 which showed EF 25%. Neuro/Psych:   Negative Neuro/Psych ROS              GI/Hepatic/Renal: Neg GI/Hepatic/Renal ROS            Endo/Other: Negative Endo/Other ROS                    Abdominal:           Vascular: negative vascular ROS. Anesthesia Plan      MAC     ASA 4       Induction: intravenous. MIPS: Prophylactic antiemetics administered. Anesthetic plan and risks discussed with patient. Plan discussed with CRNA.                   Fina Valverde DO   12/18/2020

## 2020-12-18 NOTE — H&P
[address] and Vascular [address] [facility], [place], operated by [facility] Electrophysiology  History and Physical Examination  PATIENT: Jose Garcia  MEDICAL RECORD NUMBER: [id number]  DATE OF SERVICE:  12/18/2020  ATTENDING ELECTROPHYSIOLOGIST: Chica Aly MD  REFERRING PHYSICIAN: No ref.  provider found and Karlie Guzman MD  CHIEF COMPLAINT: Nonischemic cardiomyopthay    HPI: This is a 64 y.o. female with a history of   Patient Active Problem List   Diagnosis    Admitted for acute congestive heart failure ([address]    Hyperglycemia    Pleural effusion on right    Elevated blood pressure reading without diagnosis of hypertension    Hypokalemia    New onset of congestive heart failure ([address]    Abnormal EKG    Nonischemic cardiomyopathy ([address]    Viral cardiomyopathy ([address] who presents to the hospital for CRT-D implantation for nonischemic cardiomyopathy, HFrEF and LBBB. The patient as history of HFrEF, nonischemic cardiomyopathy and LBBB. The patient was diagnosed with decompensated heart failure. Echocardiogram on 1/14/20 showed LV EF of 15% and severe MR. She underwent LHC which showed normal coronaries and LV EF of 10-15% on LV gram. She was see by advanced heart failure service and was started on GDMT including Toprol XL, Aldactone and Entresto. She was discharged home with LifeVest. She has been tolerating high-dose Entresto and spironolactone. Toprol titration limited to resting heart rate and hypotension. Repeat echocardiogram on 6/12/20 showed LV EF of 25% and on 10/12/20 showed LV EF 30%. Today she presents in SR. The patient is currently feeling well and is able to complete her ADLs and walk up stairs without any difficulty but has to do it slowly. Currently she denies any chest pain, palpitations, dizziness, syncope, orthopnea or paroxysmal nocturnal dyspnea. Discussed with her regarding indication of CRT-D implantation. Risks, benefits and alternatives were discussed. She denies any recent hospitalizations for CHF, her last admission was January 2020.     Patient Active Problem List    Diagnosis Date Noted    Abnormal EKG     [facility]ischemic [facility]     Viral cardiomyopathy ([address]     New onset of congestive heart failure ([address]     Hypokalemia     Admitted for acute congestive heart failure ([facility] 01/13/2020    Hyperglycemia 01/13/2020    Pleural effusion on right 01/13/2020    Elevated blood pressure reading without diagnosis of hypertension 01/13/2020       Past Medical History:   Diagnosis Date    [facility]     HFrEF (heart failure with reduced ejection fraction) ([address] 01/17/2020 1/14/20- echo- LVEF 15%, stage I DD, RVSF reduced, LA severely dilated, LBBB, severe MR, mild TR, :LVDD: 6.5, RVDD: 3.0  LBBB (left bundle branch block)     Nonischemic cardiomyopathy ([facility]        Family History   Problem Relation Age of Onset    Stroke Mother     No Known Problems Sister     No Known Problems Sister     No Known Problems Sister        Social History     Tobacco Use    Smoking status: Never Smoker    Smokeless tobacco: Never Used   Substance Use Topics    Alcohol use: Not Currently     Comment: socially wine. stopped wine with cardiac issues. Current Outpatient Medications   Medication Sig Dispense Refill    Cholecalciferol 100 MCG (4000 UT) CAPS Take 1,000 Units by mouth       bumetanide (BUMEX) 1 MG tablet Take 1 tablet by mouth daily as needed (>3# weight gain in a day, increased shortness of breath or swelling.) 90 tablet 2    metoprolol succinate (TOPROL XL) 50 MG extended release tablet Take 1 tablet by mouth daily 90 tablet 1    spironolactone (ALDACTONE) 25 MG tablet Take 1 tablet by mouth daily 90 tablet 1    sacubitril-valsartan (ENTRESTO)  MG per tablet Take 1 tablet by mouth 2 times daily 180 tablet 6    pantoprazole (PROTONIX) 40 MG tablet Take 40 mg by mouth every morning PCP manages script, via opt[facility] rx.  Acetaminophen (TYLENOL CHILDRENS PO) Take 320 mg by mouth as needed (headache.) childrens tylenol wild berry powder. 160mg per packet. Uses 2 packets as needed      albuterol sulfate HFA (PROAIR HFA) 108 (90 Base) MCG/ACT inhaler Inhale 2 puffs into the lungs every 4 hours as needed       No current facility-administered medications for this encounter. No Known Allergies    ROS:   Constitutional: Negative for fever, activity change and appetite change. HENT: Negative for epistaxis. Eyes: Negative for diploplia, blurred vision. Respiratory: Negative for cough, chest tightness, shortness of breath and wheezing. Cardiovascular: pertinent positives in HPI  Gastrointestinal: Negative for abdominal pain and blood in stool. All other review of systems are negative     PHYSICAL EXAM:   There were no vitals filed for this visit. Constitutional: Well-developed, no acute distress  Eyes: conjunctivae normal, no xanthelasma   Ears, Nose, Throat: oral mucosa moist, no cyanosis   CV: no JVD. Regular rate and rhythm. Normal S1S2 and no S3. No murmurs, rubs, or gallops. PMI is nondisplaced  Lungs: clear to auscultation bilaterally, normal respiratory effort without used of accessory muscles  Abdomen: soft, non-tender, bowel sounds present, no masses or hepatomegaly   Musculoskeletal: no digital clubbing, no edema   Skin: warm, no rashes     I have personally reviewed the laboratory, cardiac diagnostic and radiographic testing as outlined below:    Data:    No results for input(s): WBC, HGB, HCT, PLT in the last 72 hours. No results for input(s): NA, K, CL, CO2, BUN, CREATININE, CALCIUM in the last 72 hours. Invalid input(s): GLU, MAGNESIUM   No results found for: MG  No results for input(s): TSH in the last 72 hours. No results for input(s): INR in the last 72 hours. CXR 20:   Findings: There is a suggestion of bilateral pleural effusions         The heart is enlarged. The lung fields demonstrate evidence for airspace disease. The aorta is tortuous and ectatic.              Impression    Tortuous ectatic aorta    Cardiomegaly    Airspace disease compatible with atelectasis or pneumonia, at both    lung bases, likely with associated pleural effusion                       EK20: SR w/ LBBB/IVCD, rate: 69 bpm,  ms  - Please see scan in Cardiology. Echocardiogram 10/12/20:    Findings      Left Ventricle   Mild left ventricular dilatation. Severe global hypokinesis, abnormal septal motion, EF estimated about 30   +/-3%. Stage I diastolic dysfunction. No wall motion abnormalities. Right Ventricle   Normal right ventricle size and function. Left Atrium   Left atrium is of normal size. Interatrial septum not well visualized but appears intact. Right Atrium   Normal right atrium. Mitral Valve   Normal mitral valve structure. There is mild to moderate regurgitation - ERO 0.2cm2, PISA 0.7cm, RV 29cc. No mitral valve prolapse. Tricuspid Valve   Normal tricuspid valve structure. There is mild tricuspid regurgitation, RVSP 18mmHg. Aortic Valve   Normal aortic valve structure and function. No hemodynamically significant aortic stenosis is present. Pulmonic Valve   The pulmonic valve was not well visualized. Pericardial Effusion   No evidence of pericardial effusion. Pericardium appears normal.      Pleural Effusion   No evidence of pleural effusion. Aorta   Normal aortic root size and ascending aorta. Miscellaneous   The inferior vena cava diameter is normal with normal respiratory   variation. Conclusions      Summary   Mild left ventricular dilatation. Severe global hypokinesis, abnormal septal motion, EF estimated about 30   +/-3%. Stage I diastolic dysfunction. Interatrial septum not well visualized but appears intact. Normal right ventricle size and function. There is mild to moderate regurgitation - ERO 0.2cm2, PISA 0.7cm, RV 29cc. No mitral valve prolapse. No hemodynamically significant aortic stenosis is present. There is mild tricuspid regurgitation, RVSP 18mmHg. Normal aortic root size. No evidence of pericardial effusion. No intra cardiac mass or thrombus. Compared to prior echo from 6/12/20 which showed EF 25%.       Signature      ----------------------------------------------------------------   Electronically signed by Maddie Soriano MD(Interpreting   physician) on 10/13/2020 12:20 PM   ----------------------------------------------------------------     M-Mode/2D Measurements & Calculations      LV Diastolic    LV Systolic Dimension: 4.6   AV Cusp Separation: 1.4 cmLA Dimension: 5.5  cm                           Dimension: 2.8 cmAO Root   cm              LV Volume Diastolic: 947.5   Dimension: 2.8 cm   LV FS:16.4 %    ml   LV PW           LV Volume Systolic: 90.9 ml   Diastolic: 1 cm LV EDV/LV EDV Index: 148.5   Septum          ml/95 ml/m^2LV ESV/LV ESV    RV Diastolic Dimension: 2 cm   Diastolic: 0.9  Index: 45.9 ml/63ml/ m^2   cm              EF Calculated: 33.5 %                   LV Mass Index: 127 l/min*m^2 LA volume/Index: 39.3 ml   LV Mass: 199.32 LV Length: 8.6 cm            RA Area: 9.7 cm^2   g                   LVOT: 2.3 cm     Doppler Measurements & Calculations      MV Peak E-Wave: 0.62 m/s   AV Peak Velocity:    LVOT Peak Velocity: 0.95   MV Peak A-Wave: 0.85 m/s   1.47 m/s             m/s   MV E/A Ratio: 0.73         AV Peak Gradient:    LVOT Mean Velocity: 0.56   MV Peak Gradient: 2.2 mmHg 8.58 mmHg            m/s   MV Mean Gradient: 0.7 mmHg AV Mean Velocity:    LVOT Peak Gradient: 3.6   MV Mean Velocity: 0.41 m/s 0.95 m/s             mmHgLVOT Mean Gradient:   MV Deceleration Time:      AV Mean Gradient:    1.5 mmHg   235.7 msec                 3.9 mmHg             Estimated RVSP: 17.9 mmHg   MV P1/2t: 55.1 msec        AV VTI: 27.3 cm      Estimated RAP:3 mmHg   MVA by PHT:3.99 cm^2       AV Area   MV Area (continuity): 2.2  (Continuity):2.49   cm^2                       cm^2                 TR Velocity:1.93 m/s                                                   TR Gradient:14.85 mmHg                              LVOT VTI: 16.4 cm    PV Peak Velocity: 0.8 m/s   MR Velocity: 3.69 m/s                           PV Peak Gradient: 2.55   MV REUBEN PISA: 0.21 cm^2     Estimated PASP:      mmHg   MR VTI: 138.6 cm           17.85 mmHg           PV Mean Velocity: 0.54 m/s   Alias Velocity: 0.25                            PV Mean Gradient: 1.3 mmHg   m/sPISA Radius: 0.7 cm      PISA area: 3.08 cm^2MR   flow rate: 78.23 ml/sMR   volume:29.11 ml or four small branches. No angiographic stenosis noted. 5.  RCA:  It is large and dominant giving rise to a conus branch, an SA  jamar or AV jamar branch, an RV marginal branch, then a PDA and PLV  branch. No angiographic stenosis noted.     Left ventriculogram revealed significantly dilated left ventricle with  an ejection fraction of 10% to 15%. There was severe mitral  regurgitation noted.     IMPRESSION:  1. Absence of angiographic coronary stenosis. 2.  Significantly elevated left ventricular end-diastolic pressure at 37  mmHg. 3.  Dilated left ventricle with an ejection fraction of 10% to 15%. 4.  Severe mitral regurgitation.     RECOMMENDATIONS:  As per Dr Sissy Smyth.     PROCEDURE/SEDATION START TIME:  0577.     PROCEDURE END TIME:  1605.     CONTRAST VOLUME:  50 mL of Optiray.     FLUOROSCOPY TIME:  2.0 minutes.     CONSCIOUS SEDATION:  50 mcg of intravenous fentanyl.           Scout Lugo MD    I have independently reviewed all of the ECGs and rhythm strips per above     Assessment/Plan: This is a 64 y.o. female with a history of   Patient Active Problem List   Diagnosis    Admitted for acute congestive heart failure ([address]    Hyperglycemia    Pleural effusion on right    Elevated blood pressure reading without diagnosis of hypertension    Hypokalemia    New onset of congestive heart failure ([address]    Abnormal EKG    Nonischemic cardiomyopathy ([address]    Viral cardiomyopathy ([address]    who presents with nonischemic cardiomyopathy . 1. Nonischemic cardiomyopathy and HFrEF  - Diagnosed 1/14/20.  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  - LV EF 15% on TTE on 1/14/20.  - LV EF 10-15% on LV gram on 1/16/20.  - LV EF 25% on TTE on 6/12/20.  - LV EF 30% on TTE on 10/12/20.  - On GDMT including Toprol XL, Entresto and Aldactone  - NYHA class II and ACC stage C  - Euvolemic and compensated. - EKG showed LBBB/IVCD with  msec. - She would likely benefit from CRT-D for primary prophylaxis of SCD and treatment of CHF. - During the visit, the patient was provided a copy of \"A decision aid for Implantable Cardiac Defibrillators (ICD): For patient's with heart failure considering an ICD who are at risk for sudden cardiac death(primary prevention). \"  The patient participated in shared decision-making for ICD implantation. The procedure was described in detail. The risks, benefits, and alternatives to CRT-D implantation and possible defibrillation threshold testing were discussed with the patient. These risks include but are not limited to bleeding, infection, blood clot, pneumothorax, hemothorax, cardiac valve damage, cardiac perforation and tamponade required emergent thoracotomy, contrast induced nephropathy leading to short or even long term dialysis, vascular injury requiring emergent surgical repair, lead dislodgement required reposition, stroke and even death. The patient understands these risks and wishes to proceed with CRT-D implantation and possible defibrillation threshold testing. 2. LBBB/IVCD  - Chronic  -  ms. 3. Mitral regurgitation  - Secondary MR.  - Severe MR on TTE 1/14/20.  - Mild to moderate MR on TTE 6/12/20.  - Mild to moderate MR on TTE 10/12/20.    4. Vitamin D deficiency  - On Cholecalciferol. Recommendations:  1. CRT-D implantation for primary prophylaxis of SCD and treatment of CHF. 2. Follow up after the procedure. Encouraged the patient to call the office for any questions or concerns. Thank you for allowing me to participate in your patient's care. Please call me if there are any questions or concerns.       Phil Arredondo MD  Cardiac Electrophysiology  [address]   The [facility] and Vascular [place]: [place] [facility]  7:56 AM  12/18/2020

## 2020-12-18 NOTE — OP NOTE
[address] [facility] Electrophysiology  Procedure Report  PATIENT: Felix Greco  MEDICAL RECORD NUMBER: [id number]  DATE OF PROCEDURE:  12/18/2020  ATTENDING ELECTROPHYSIOLOGIST: Aaron Galeano MD  REFERRING PHYSICIAN: Dr. Mirella Allen    Procedure Performed:  1. Insertion of Bi-Ventricular Implantable Cardioverter Defibrillator (Medtronic)  2. Coronary Sinus venography  3. Left upper extremity venography  4. Fluoroscopy    Indication for Procedure:  1. NYHA Class II Stage C non-Ischemic Cardiomyopathy with LBBB and QRS > 120 ms with an EF <=35% despite guideline directed medical therapy. Based on ACC/AHA guidelines we have recommened primary prevention ICD implant for prevention of sudden death and CRT for symptomatic heart failure. The procedure was described in detail. The risks, benefits, and alternatives to ICD/CRT implantation and possible defibrillation threshold testing were discussed with the patient. These risks include but are not limited to: bleeding, infection, vascular injury requiring emergent surgical repair, blood clot, pneumothorax, hemothorax, cardiac valve damage, contrast induced nephropathy which can lead to short or even long term dialysis, cardiac perforation, tamponade, lead dislodgement, and even death. The patient understands these risks and wishes to proceed with ICD implantation and possible  defibrillation threshold testing. Flouroscopy: 24 min  Complications: none immediately apparent  EBL: minimal  Specimens: none  Contrast: 35 cc    FINDINGS:  Implanted device information:  1. Pulse Generator is a Medtronic. Serial # [id number]  Placement: Left pectoral subcutaneous  Date of implant: 12/18/2020  2. Right atrial lead parameters are as follows:  Medtronic Model # [id number]. Serial # [id number]  Lead position: RA  Date of implant: 12/18/2020  P-waves: 3.1 mV  Pacing threshold: 1.0 V at 0.4 ms. Impedance: 456 ohms. into the right atrial body. Through this, a [employer] [employer] deflectable decapolar CS catheter was advanced and used to cannulate the os of the coronary sinus. Position was confirmed with radiographic iodinated contrast injection. The CS sheath was then advanced over the catheter. Through the CS sheath, coronary sinus venography was performed, and this image was used as a road map. The ventricular lead was advanced through the CS sheath over a 0.014-inch guide-wire to a high lateral branch of the coronary sinus. The 135 degree subselect inner sheath was also used to engage the coronary sinus branch. The 0.014 inch guide wire was then pulled back, and lead parameters were checked and deemed to be adequate. There was no diaphragmatic stimulation at 10 V pacing. The subselect sheath and the CS sheath were removed and the LV lead was sewn to the pre-pectoral fascia using 0-Ethibond sutures, suturing over the suture sleeves. Over the remaining guide-wire in the subclavian vein a 7-Slovak Safe-sheath was passed. Through this, the right atrial lead was advanced without difficulty to the right atrial appendage using a combination of straight and curved stylets and under fluoroscopic guidance. Mapping of the lead was performed. Lead parameters were deemed to be adequate and lead was then actively fixated in place. The 7-Fr Safe-sheath was then removed. The lead was then sewn to the pre-pectoral fascia using 0 Ethibond sutures, suturing over the suture sleeve. The device pocket was then irrigated with antibiotic vancomycin solution. The leads were then attached to the appropriate binding posts on the header of the device. The set screws were then tightened with a torque wrench. Tug tests were performed on all leads to ensure they are adequately fixated. The device and the leads were then placed within the newly formed device pocket. Lead parameters were then rechecked via the device and deemed to be adequate.   The pulse generator was tied to the pre-pectoral fascia using 0-Ethibond. The incision was closed with 3 layers of absorbable suture, Vicryl. The deepest of which was a 2-0 interrupted stitches followed by a 2nd layer of 3-0 running stitch performed perpendicular to the deep layer and, lastly, a 4-0 subcuticular stitch. The skin was then prepped with benzoin solution, Steri-Strips, and island dressing were then applied. This was followed by a pressure dressing. At the end of the case, the patient was hemodynamically stable and under the care of the anesthesiologist, the patient was brought back to the recovery area for post procedural monitoring. ASSESSMENT:  1. Successful implantation of a Medtronic Bi-V ICD for cardiac resynchronization of congestive heart failure NYHA Class II and primary prevention of sudden cardiac death. RECOMMENDATIONS:  1. Stat portable chest x-ray to rule out pneumothorax and check lead placement as well as in the morning. 2. EKG to be performed now. 3. Post-procedural monitoring in the recovery area. 4. Patient will be admitted for overnight observation on Telemetry. 5. The patient will receive 24-hours of vicky-operative intravenous antibiotics. 6. The patient's device will be interrogated in the morning by a representative of the device   7. The patient is to follow-up in device clinic within 2 weeks upon discharge for a wound check  8. The patient is advised follow all post-operative device and wound care instructions as per the information provided in the pre-operative clinic.     Luc Anderson MD  Cardiac Electrophysiology  [address]  The [facility] Vascular [place]: [place] [facility]  1:57 PM  12/18/2020

## 2020-12-19 VITALS
WEIGHT: 119 LBS | DIASTOLIC BLOOD PRESSURE: 72 MMHG | RESPIRATION RATE: 16 BRPM | HEIGHT: 64 IN | OXYGEN SATURATION: 98 % | SYSTOLIC BLOOD PRESSURE: 127 MMHG | BODY MASS INDEX: 20.32 KG/M2 | HEART RATE: 75 BPM | TEMPERATURE: 97.2 F

## 2020-12-19 LAB
ANION GAP SERPL CALCULATED.3IONS-SCNC: 13 MMOL/L (ref 7–16)
BUN BLDV-MCNC: 13 MG/DL (ref 6–20)
CALCIUM SERPL-MCNC: 9.2 MG/DL (ref 8.6–10.2)
CHLORIDE BLD-SCNC: 108 MMOL/L (ref 98–107)
CO2: 19 MMOL/L (ref 22–29)
CREAT SERPL-MCNC: 0.7 MG/DL (ref 0.5–1)
EKG ATRIAL RATE: 63 BPM
EKG P AXIS: 47 DEGREES
EKG P-R INTERVAL: 196 MS
EKG Q-T INTERVAL: 464 MS
EKG QRS DURATION: 104 MS
EKG QTC CALCULATION (BAZETT): 474 MS
EKG R AXIS: -80 DEGREES
EKG T AXIS: 38 DEGREES
EKG VENTRICULAR RATE: 63 BPM
GFR AFRICAN AMERICAN: >60
GFR NON-AFRICAN AMERICAN: >60 ML/MIN/1.73
GLUCOSE BLD-MCNC: 81 MG/DL (ref 74–99)
HCT VFR BLD CALC: 35.1 % (ref 34–48)
HEMOGLOBIN: 12.1 G/DL (ref 11.5–15.5)
MAGNESIUM: 2.1 MG/DL (ref 1.6–2.6)
MCH RBC QN AUTO: 29.5 PG (ref 26–35)
MCHC RBC AUTO-ENTMCNC: 34.5 % (ref 32–34.5)
MCV RBC AUTO: 85.6 FL (ref 80–99.9)
PDW BLD-RTO: 13.2 FL (ref 11.5–15)
PLATELET # BLD: 183 E9/L (ref 130–450)
PMV BLD AUTO: 10.9 FL (ref 7–12)
POTASSIUM SERPL-SCNC: 3.8 MMOL/L (ref 3.5–5)
RBC # BLD: 4.1 E12/L (ref 3.5–5.5)
SODIUM BLD-SCNC: 140 MMOL/L (ref 132–146)
WBC # BLD: 6 E9/L (ref 4.5–11.5)

## 2020-12-19 PROCEDURE — 85027 COMPLETE CBC AUTOMATED: CPT

## 2020-12-19 PROCEDURE — 36415 COLL VENOUS BLD VENIPUNCTURE: CPT

## 2020-12-19 PROCEDURE — G0378 HOSPITAL OBSERVATION PER HR: HCPCS

## 2020-12-19 PROCEDURE — 93010 ELECTROCARDIOGRAM REPORT: CPT | Performed by: INTERNAL MEDICINE

## 2020-12-19 PROCEDURE — 83735 ASSAY OF MAGNESIUM: CPT

## 2020-12-19 PROCEDURE — 6370000000 HC RX 637 (ALT 250 FOR IP): Performed by: STUDENT IN AN ORGANIZED HEALTH CARE EDUCATION/TRAINING PROGRAM

## 2020-12-19 PROCEDURE — 96375 TX/PRO/DX INJ NEW DRUG ADDON: CPT

## 2020-12-19 PROCEDURE — 6360000002 HC RX W HCPCS: Performed by: INTERNAL MEDICINE

## 2020-12-19 PROCEDURE — 99217 PR OBSERVATION CARE DISCHARGE MANAGEMENT: CPT | Performed by: STUDENT IN AN ORGANIZED HEALTH CARE EDUCATION/TRAINING PROGRAM

## 2020-12-19 PROCEDURE — 2580000003 HC RX 258: Performed by: INTERNAL MEDICINE

## 2020-12-19 PROCEDURE — 80048 BASIC METABOLIC PNL TOTAL CA: CPT

## 2020-12-19 RX ADMIN — METOPROLOL SUCCINATE 50 MG: 50 TABLET, EXTENDED RELEASE ORAL at 08:50

## 2020-12-19 RX ADMIN — Medication 2 G: at 06:05

## 2020-12-19 RX ADMIN — ACETAMINOPHEN 650 MG: 325 TABLET ORAL at 08:49

## 2020-12-19 RX ADMIN — PANTOPRAZOLE SODIUM 40 MG: 40 TABLET, DELAYED RELEASE ORAL at 06:05

## 2020-12-19 RX ADMIN — ACETAMINOPHEN 650 MG: 325 TABLET ORAL at 04:31

## 2020-12-19 RX ADMIN — SODIUM CHLORIDE, PRESERVATIVE FREE 10 ML: 5 INJECTION INTRAVENOUS at 08:50

## 2020-12-19 ASSESSMENT — PAIN DESCRIPTION - ORIENTATION
ORIENTATION: LEFT

## 2020-12-19 ASSESSMENT — PAIN DESCRIPTION - PAIN TYPE
TYPE: ACUTE PAIN

## 2020-12-19 ASSESSMENT — PAIN SCALES - GENERAL
PAINLEVEL_OUTOF10: 7
PAINLEVEL_OUTOF10: 0
PAINLEVEL_OUTOF10: 9
PAINLEVEL_OUTOF10: 7

## 2020-12-19 ASSESSMENT — PAIN DESCRIPTION - LOCATION
LOCATION: CHEST

## 2020-12-19 NOTE — ANESTHESIA POSTPROCEDURE EVALUATION
Department of Anesthesiology  Postprocedure Note    Patient: Nadia Johnson  MRN: [id number]  YOB: 1960  Date of evaluation: 12/19/2020  Time:  6:52 AM     Procedure Summary     Date: 12/18/20 Room / Location: [facility] CATH LAB    Anesthesia Start: 5852 Anesthesia Stop: 4426    Procedure: ICD W ANESTHESIA Diagnosis:       Other cardiomyopathies      History of implantable cardioverter-defibrillator (ICD) placement      Nonischemic cardiomyopathy ([facility]    Scheduled Providers: RAJESH Agrawal CRNA; Keny Alcantara DO Responsible Provider: Keny Alcantara DO    Anesthesia Type: MAC ASA Status: 4          Anesthesia Type: MAC    Elise Phase I:      Elise Phase II:      Last vitals: Reviewed and per EMR flowsheets.        Anesthesia Post Evaluation    Patient location during evaluation: bedside  Patient participation: complete - patient cannot participate  Level of consciousness: awake and alert  Airway patency: patent  Nausea & Vomiting: no nausea and no vomiting  Complications: no  Cardiovascular status: blood pressure returned to baseline  Respiratory status: acceptable  Hydration status: euvolemic

## 2020-12-19 NOTE — DISCHARGE SUMMARY
[facility] Cardiac Electrophysiology Discharge Summary    Nadia Johnson  1960    64 y.o.  female  PCP: Muna Mantilla MD    Admission Date:12/18/2020      Discharge Date:  12/19/20    Patient Active Problem List   Diagnosis    Admitted for acute [facility]ive [facility] failure ([facility]    Hyperglycemia    Pleural effusion on right    Elevated blood pressure reading without diagnosis of hypertension    Hypokalemia    New onset of congestive heart failure ([facility]    Abnormal EKG    Nonischemic cardiomyopathy ([facility]    Viral cardiomyopathy ([facility]    History of implantable cardioverter-defibrillator (ICD) placement        [facility]   Home Medication Instructions PRM:[id number]    Printed on:12/19/20 3830   Medication Information                      Acetaminophen (TYLENOL CHILDRENS PO)  Take 320 mg by mouth as needed (headache.) childrens tylenol wild berry powder. 160mg per packet.  Uses 2 packets as needed             albuterol sulfate HFA (PROAIR HFA) 108 (90 Base) MCG/ACT inhaler  Inhale 2 puffs into the lungs every 4 hours as needed             bumetanide (BUMEX) 1 MG tablet  Take 1 tablet by mouth daily as needed (>3# weight gain in a day, increased shortness of breath or swelling.)             Cholecalciferol 100 MCG (4000 UT) CAPS  Take 1,000 Units by mouth              metoprolol succinate (TOPROL XL) 50 MG extended release tablet  Take 1 tablet by mouth daily             pantoprazole (PROTONIX) 40 MG tablet  Take 40 mg by mouth every morning PCP manages script, via opt[facility] rx.             sacubitril-valsartan (ENTRESTO)  MG per tablet  Take 1 tablet by mouth 2 times daily             spironolactone (ALDACTONE) 25 MG tablet  Take 1 tablet by mouth daily Hospital Course: Patient presented for outpatient CRT-D implant in setting of HFrEF, which was successfully performed by Dr Isabella Lorenzo on 12/18/20. Pressure dressing removed. CIED incision C/D/I with left UE in sling. CXR with stable device position and no pneumothorax. CIED interrogation today with stable device function. Procedures Performed: CRT-D implant    Consultants: none    Disposition: The patient was discharged to home in stable condition on the above medications. Clinial follow-up in the office on 1/7/21 for device clinic.       Gisele Winston DO  [address] [facility] Cardiac Electrophysiology  [address] [facility]    < 30 minutes was used in preparation of the discharge summary

## 2020-12-19 NOTE — PLAN OF CARE
Problem: Pain:  Goal: Pain level will decrease  Description: Pain level will decrease  12/19/2020 0255 by Magnus Allen RN  Outcome: Ongoing  12/18/2020 1808 by Jasvir Soares RN  Outcome: Met This Shift

## 2020-12-21 ENCOUNTER — TELEPHONE (OUTPATIENT)
Dept: ADMINISTRATIVE | Age: 60
End: 2020-12-21

## 2020-12-21 NOTE — TELEPHONE ENCOUNTER
I called Sahara Stahl to see how she's doing since her ICD insertion on 12/18/20. She states she's doing fine & that the aquacel dressing remains intact. She denies any fevers, redness, bleeding, drainage, or swelling at ICD incision site. I reminded her to remove the aquacel dsg on Friday 12/25/20, not to touch the steri strips,  to continue to wear her sling at night for 4 weeks; along with not abducting the L arm above the shoulder. She's also aware of her follow up appt at the [address] [place] [address] Drive on 1/7/20 at 9:00 am.  She offers no complaints & is thankful for the phone call.

## 2021-01-07 ENCOUNTER — NURSE ONLY (OUTPATIENT)
Dept: NON INVASIVE DIAGNOSTICS | Age: 61
End: 2021-01-07
Payer: COMMERCIAL

## 2021-01-07 VITALS — TEMPERATURE: 97.5 F

## 2021-01-07 DIAGNOSIS — Z95.810 BIVENTRICULAR ICD (IMPLANTABLE CARDIOVERTER-DEFIBRILLATOR) IN PLACE: ICD-10-CM

## 2021-01-07 DIAGNOSIS — I42.8 NONISCHEMIC CARDIOMYOPATHY (HCC): Primary | ICD-10-CM

## 2021-01-07 PROCEDURE — 93284 PRGRMG EVAL IMPLANTABLE DFB: CPT | Performed by: INTERNAL MEDICINE

## 2021-01-14 ENCOUNTER — TELEPHONE (OUTPATIENT)
Dept: CARDIOLOGY CLINIC | Age: 61
End: 2021-01-14

## 2021-01-14 NOTE — TELEPHONE ENCOUNTER
Cover my meds sent fax [facility] [place] 1/14/2021   Obtained PA.   Entresto 97-103mg BID    [id number]  Good till  1 14 2022

## 2021-01-27 ENCOUNTER — HOSPITAL ENCOUNTER (OUTPATIENT)
Age: 61
Discharge: HOME | End: 2021-01-27
Payer: COMMERCIAL

## 2021-01-27 DIAGNOSIS — I50.22 CHRONIC HFREF (HEART FAILURE WITH REDUCED EJECTION FRACTION) (HCC): ICD-10-CM

## 2021-01-27 DIAGNOSIS — I42.8 NON-ISCHEMIC CARDIOMYOPATHY (HCC): ICD-10-CM

## 2021-01-27 DIAGNOSIS — I50.30 DIASTOLIC CONGESTIVE HEART FAILURE, UNSPECIFIED HF CHRONICITY (HCC): Primary | ICD-10-CM

## 2021-01-27 DIAGNOSIS — I50.30 DIASTOLIC CONGESTIVE HEART FAILURE, UNSPECIFIED HF CHRONICITY (HCC): ICD-10-CM

## 2021-01-27 LAB
ANION GAP SERPL CALCULATED.3IONS-SCNC: 17 MMOL/L (ref 7–16)
BUN BLDV-MCNC: 9 MG/DL (ref 6–20)
CALCIUM SERPL-MCNC: 10.1 MG/DL (ref 8.6–10.2)
CHLORIDE BLD-SCNC: 101 MMOL/L (ref 98–107)
CO2: 25 MMOL/L (ref 22–29)
CREAT SERPL-MCNC: 0.7 MG/DL (ref 0.5–1)
GFR AFRICAN AMERICAN: >60
GFR NON-AFRICAN AMERICAN: >60 ML/MIN/1.73
GLUCOSE BLD-MCNC: 79 MG/DL (ref 74–99)
POTASSIUM SERPL-SCNC: 4.1 MMOL/L (ref 3.5–5)
PRO-BNP: 115 PG/ML (ref 0–125)
SODIUM BLD-SCNC: 143 MMOL/L (ref 132–146)

## 2021-01-27 PROCEDURE — 83880 ASSAY OF NATRIURETIC PEPTIDE: CPT

## 2021-01-27 PROCEDURE — 36415 COLL VENOUS BLD VENIPUNCTURE: CPT

## 2021-01-27 PROCEDURE — 80048 BASIC METABOLIC PNL TOTAL CA: CPT

## 2021-01-27 RX ORDER — METOPROLOL SUCCINATE 50 MG/1
50 TABLET, EXTENDED RELEASE ORAL DAILY
Qty: 90 TABLET | Refills: 3 | Status: SHIPPED
Start: 2021-01-27 | End: 2021-08-02 | Stop reason: SDUPTHER

## 2021-01-27 RX ORDER — SPIRONOLACTONE 25 MG/1
25 TABLET ORAL DAILY
Qty: 90 TABLET | Refills: 3 | Status: SHIPPED
Start: 2021-01-27 | End: 2021-08-02 | Stop reason: SDUPTHER

## 2021-01-27 NOTE — RESULT ENCOUNTER NOTE
Labs reviewed  Please telephone encounter from Helder Ellison earlier today  Patient previously on PRN bumex, however was using increased doses. Instructed to start using Bumex 1 mg daily   Has labs drawn today with noted decreased pro-bnp ( when compared to 6 months ago)  Stable potassium and renal function     Rosanne please set reminder to call patient in one week to assess how she is feeling on daily bumex. Thank you.

## 2021-01-27 NOTE — TELEPHONE ENCOUNTER
SHARA CALLED FOR REFILLS AND TO SEE ABOUT GETTING THE DOSE ON HER BUMEX INCREASED. SHE SAID THAT SHE TAKES IT 3-4 TIMES A WEEK AND TAKES 3 TABLETS IN ONE DAY. PER RD  TAKE BUMEX 1 MG. DAILY EVERY DAY. REFILLS WERE GIVEN, AND SHARA WAS TOLD TO HAVE LAB WORK DONE. I GAVE SHARA THIS INFORMATION AND SHE WILL  THE LAB SLIPS.   RLL

## 2021-02-15 ENCOUNTER — OFFICE VISIT (OUTPATIENT)
Dept: CARDIOLOGY CLINIC | Age: 61
End: 2021-02-15
Payer: COMMERCIAL

## 2021-02-15 VITALS
WEIGHT: 119.4 LBS | RESPIRATION RATE: 16 BRPM | DIASTOLIC BLOOD PRESSURE: 62 MMHG | HEART RATE: 86 BPM | SYSTOLIC BLOOD PRESSURE: 102 MMHG | BODY MASS INDEX: 20.38 KG/M2 | HEIGHT: 64 IN

## 2021-02-15 DIAGNOSIS — I50.22 CHRONIC HFREF (HEART FAILURE WITH REDUCED EJECTION FRACTION) (HCC): Primary | ICD-10-CM

## 2021-02-15 DIAGNOSIS — I50.30 DIASTOLIC CONGESTIVE HEART FAILURE, UNSPECIFIED HF CHRONICITY (HCC): ICD-10-CM

## 2021-02-15 DIAGNOSIS — I42.8 NON-ISCHEMIC CARDIOMYOPATHY (HCC): ICD-10-CM

## 2021-02-15 PROCEDURE — 93000 ELECTROCARDIOGRAM COMPLETE: CPT | Performed by: INTERNAL MEDICINE

## 2021-02-15 PROCEDURE — G8420 CALC BMI NORM PARAMETERS: HCPCS | Performed by: NURSE PRACTITIONER

## 2021-02-15 PROCEDURE — 3017F COLORECTAL CA SCREEN DOC REV: CPT | Performed by: NURSE PRACTITIONER

## 2021-02-15 PROCEDURE — 1036F TOBACCO NON-USER: CPT | Performed by: NURSE PRACTITIONER

## 2021-02-15 PROCEDURE — G8427 DOCREV CUR MEDS BY ELIG CLIN: HCPCS | Performed by: NURSE PRACTITIONER

## 2021-02-15 PROCEDURE — G8484 FLU IMMUNIZE NO ADMIN: HCPCS | Performed by: NURSE PRACTITIONER

## 2021-02-15 PROCEDURE — 99213 OFFICE O/P EST LOW 20 MIN: CPT | Performed by: NURSE PRACTITIONER

## 2021-02-15 RX ORDER — ATORVASTATIN CALCIUM 20 MG/1
TABLET, FILM COATED ORAL
COMMUNITY
Start: 2021-01-20

## 2021-02-15 RX ORDER — CALCIUM CARBONATE/VITAMIN D3 500MG-5MCG
TABLET ORAL
COMMUNITY
Start: 2021-01-20

## 2021-02-15 RX ORDER — BUMETANIDE 2 MG/1
2 TABLET ORAL DAILY PRN
Qty: 90 TABLET | Refills: 3 | Status: SHIPPED
Start: 2021-02-15 | End: 2022-01-03 | Stop reason: SDUPTHER

## 2021-02-15 RX ORDER — MELATONIN
COMMUNITY
Start: 2021-01-20

## 2021-02-15 NOTE — PROGRESS NOTES
onset of congestive heart failure ([facility]     Hypokalemia     Admitted for acute congestive heart failure ([facility] 01/13/2020    Hyperglycemia 01/13/2020    Pleural effusion on right 01/13/2020    Elevated blood pressure reading without diagnosis of hypertension 01/13/2020         Past Medical History:   Diagnosis Date    [facility]congestive [facility] failure) [facility]     HFrEF (heart failure with reduced ejection fraction) ([facility] 01/17/2020 1/14/20- echo- LVEF 15%, stage I DD, RVSF reduced, LA severely dilated, LBBB, severe MR, mild TR, :LVDD: 6.5, RVDD: 3.0    LBBB (left bundle branch block)     Nonischemic cardiomyopathy ([facility]          Past Surgical History:   Procedure Laterality Date    CARDIAC CATHETERIZATION  01/16/2020    DR Nayeli Mitchell CARDIAC DEFIBRILLATOR PLACEMENT  12/18/2020    BIV ICD    ([employer])    DR. Ivan Ram    COLONOSCOPY           No Known Allergies        Outpatient Medications Marked as Taking for the 2/15/21 encounter (Office Visit) with RAJESH Malave - CNP   Medication Sig Dispense Refill    atorvastatin (LIPITOR) 20 MG tablet TAKE 1 TABLET BY MOUTH EVERY DAY      OYSCO 500 + D 500-200 MG-UNIT per tablet TAKE 1 TABLET BY MOUTH TWICE DAILY FOR 30 DAYS      vitamin D3 (CHOLECALCIFEROL) 25 MCG (1000 UT) TABS tablet TAKE 1 TABLET BY MOUTH EVERY DAY      ciclopirox (PENLAC) 8 % solution APPLY AS DIRECTED      metoprolol succinate (TOPROL XL) 50 MG extended release tablet Take 1 tablet by mouth daily 90 tablet 3    spironolactone (ALDACTONE) 25 MG tablet Take 1 tablet by mouth daily 90 tablet 3    bumetanide (BUMEX) 1 MG tablet Take 1 tablet by mouth daily as needed (>3# weight gain in a day, increased shortness of breath or swelling.) 90 tablet 2    sacubitril-valsartan (ENTRESTO)  MG per tablet Take 1 tablet by mouth 2 times daily 180 tablet 6    pantoprazole (PROTONIX) 40 MG tablet Take 40 mg by mouth every morning PCP manages script, via [facility].       Acetaminophen (TYLENOL CHILDRENS PO) Take 320 mg by mouth as needed (headache.) childrens tylenol wild berry powder. 160mg per packet. Uses 2 packets as needed         Guideline directed medical/device therapy:  ARNI/ACE I/ARB: Yes  Beta blocker: Yes  Aldosterone antagonist:  Yes  ICD/CRT-P/-D:  CRT-D  QRS interval on recent ECG (personally reviewed/interpreted): 139  Percentage RV pacing (personally reviewed/interpreted): %/NA        Review of Systems:   Cardiac: As per HPI  General: No fever, chills, rigors  Pulmonary: As per HPI  HEENT: No visual disturbances, difficult swallowing  GI: No nausea, vomiting, abdominal pain  : No dysuria or hematuria  Endocrine: No thyroid disease or diabetes  Musculoskeletal: VELEZ x 4, no focal motor deficits  Skin: Intact, no rashes  Neuro/Psych: No headache or seizures      Weights: Wt Readings from Last 3 Encounters:   02/15/21 119 lb 6.4 oz (54.2 kg)   12/18/20 119 lb (54 kg)   11/23/20 123 lb (55.8 kg)       Physical Examination:     /62 (Site: Left Upper Arm, Position: Sitting, Cuff Size: Medium Adult)   Pulse 86   Resp 16   Ht 5' 4\" (1.626 m)   Wt 119 lb 6.4 oz (54.2 kg)   BMI 20.49 kg/m²     CONSTITUTIONAL: Alert and oriented times 3, no acute distress and cooperative to examination with proper mood and affect. SKIN: Skin color, texture, turgor normal. No rashes or lesions. LYMPH: no cervical nodes, no inguinal nodes  HEENT: Head is normocephalic, atraumatic. EOMI, PERRLA. NECK: Supple, symmetrical, trachea midline, no adenopathy, thyroid symmetric, not enlarged and no tenderness, skin normal. No jvd/hjr. CHEST/LUNGS: chest symmetric with normal A/P diameter, normal respiratory rate and rhythm, lungs clear to auscultation without wheezes, rales or rhonchi. No accessory muscle use. Scars None   CARDIOVASCULAR: Heart sounds are normal.  Regular rate and rhythm without murmur, gallop or rub. Normal S1 and S2. . Carotid and femoral pulses 2+/4 and equal bilaterally. ABDOMEN: Normal shape. No and Laparoscopic scar(s) present. Normal bowel sounds. No bruits. soft, nondistended, no masses or organomegaly. no evidence of hernia. Percussion: Normal without hepatosplenomegally. Tenderness: absent. RECTAL: deferred, not clinically indicated  NEUROLOGIC: There are no focalizing motor or sensory deficits. CN II-XII are grossly intact. Lynne Robin EXTREMITIES: no cyanosis, no clubbing and no edema. Warm and well perfused. All the following diagnostics were personally reviewed and interpreted by me.        LAB DATA:     7/13/2020 10:29 1/27/2021 12:13   Sodium 138 143   Potassium 3.9 4.1   Chloride 99 101   CO2 25 25   BUN 25 (H) 9   Creatinine 0.8 0.7   Anion Gap 14 17 (H)   GFR Non- >60 >60   GFR African American >60 >60   Magnesium     Glucose 110 (H) 79   Calcium 9.7 10.1   Total Protein     Pro- (H) 115        1/15/2020 14:50 1/16/2020 05:01   RESPIRATORY PANEL, MOLECULAR Rpt    Coxsackie B1 Ab  1:40   Coxsackie B2 Ab  1:10   Coxsackie B3 Ab  <1:10   Coxsackie B4 Ab  1:320 (H)   Coxsackie B5 Ab  <1:10   Coxsackie B6 Ab  <1:10   EBV VCA IgM  <10.0   HIV-1/HIV-2 Ab  Non-Reactive   Parvovirus B19 IgG  0.40   Parvovirus B19 IgM  0.09   Influenza A by PCR Not Detected    Influenza B by PCR Not Detected    Adenovirus by PCR Not Detected    Coronavirus 229E by PCR Not Detected    Coronavirus HKU1 by PCR Not Detected    Coronavirus NL63 by PCR Not Detected    Coronavirus OC43 by PCR Not Detected    Human Metapneumovirus by PCR Not Detected    Human Rhinovirus/Enterovirus by PCR Not Detected    Parainfluenza Virus 1 by PCR Not Detected    Parainfluenza Virus 2 by PCR Not Detected    Parainfluenza Virus 3 by PCR Not Detected    Parainfluenza Virus 4 by PCR Not Detected    Respiratory Syncytial Virus by PCR Not Detected    Bordetella parapertussis by PCR Not Detected    Chlamydophilia pneumoniae by PCR Not Detected    Mycoplasma pneumoniae by PCR Not Detected    CMV IgG  SEE BELOW   CMV IgM  SEE BELOW         IMAGING:    No recent imaging      CARDIAC TESTING:     TTE (1/14/2020, Dr. Shubham Whitaker)   Summary:   Moderately dilated left ventricle.   Abnormal (paradoxical) motion consistent with left bundle branch block.   Severely reduced left ventricular systolic function.   There is doppler evidence of stage I diastolic dysfunction.   The left atrium is severely dilated.   Right ventricle global systolic function is reduced.   Severe posterior mitral regurgitation is present.   Mild tricuspid regurgitation.  Troy Rain is a small circumferential pericardial effusion noted.     [facility] (1/16/2020)  HEMODYNAMICS:  1.  Aortic pressure 104/85 mmHg. 2.  Left ventricular end-diastolic pressure 37 mmHg. CORONARY ANATOMY:  1.  Left main:  It is medium in size with no angiographic stenosis noted. 2.  LAD:  It is large in caliber and wraps around the apex and gives rise to one diagonal branch.  No angiographic stenosis noted. 3.  Ramus:  It is a very small branch. 4.  Left circumflex:  It is fair in size and giving rise to a large bifurcating obtuse marginal branch, then continues to AV groove in three  or four small branches.  No angiographic stenosis noted. 5.  RCA:  It is large and dominant giving rise to a conus branch, an SA jamar or AV jamar branch, an RV marginal branch, then a PDA and PLV branch.  No angiographic stenosis noted. Left ventriculogram revealed significantly dilated left ventricle with an ejection fraction of 10% to 15%.  There was severe mitral  regurgitation noted. IMPRESSION:  1.  Absence of angiographic coronary stenosis. 2.  Significantly elevated left ventricular end-diastolic pressure at 37 mmHg. 3.  Dilated left ventricle with an ejection fraction of 10% to 15%. 4.  Severe mitral regurgitation. TTE (6/12/2020, Dr. Shubham Whitaker)   Summary:   Mildly dilated left ventricle. Abnormal (paradoxical) motion consistent with conduction abnormality. Severely reduced left ventricular systolic function. There is doppler evidence of stage I diastolic dysfunction. Mild-moderate mitral regurgitation is present. Mild tricuspid regurgitation.     TTE (10/13/2020, Dr. John Fletcher)   Summary:   Mild left ventricular dilatation. Severe global hypokinesis, abnormal septal motion, EF estimated about 30 +/-3%. Stage I diastolic dysfunction. Interatrial septum not well visualized but appears intact. Normal right ventricle size and function. There is mild to moderate regurgitation - ERO 0.2cm2, PISA 0.7cm, RV 29cc. No mitral valve prolapse. No hemodynamically significant aortic stenosis is present. There is mild tricuspid regurgitation, RVSP 18mmHg. Normal aortic root size. No evidence of pericardial effusion. No intra cardiac mass or thrombus. Compared to prior echo from 6/12/20 which showed EF 25%. EKG  Sinus Rhythm   IVCD  Left atrial enlargement. T-abnormality possible inferior ischemia.     EKG:   Sinus Rhythm   Combined atrial enlargement.          ASSESSMENT:  1. Chronic HFrEF  2. ACC stage C / NYHA class II  3. Euvolemic   4. Nonischemic cardiomyopathy  5. Positive coxsackie B4 Ab  5. LVEF improved from 15% > 30%  6. LVEDD 6.5 > 6 > 5.5  7. LVMI 144 > 128 > 127  8. Severe MR (ERO 0.3) > Mild/moderate MR (ERO 0.2)  9. Preserved RV function on most recent TTE  10. LBBB  11. CRT-D in situ ( DOI 12/2020)  12. Unable to tolerate higher doses of BB  13. HLD - on statin therapy  14. GERD        PLAN:  1. Continue current cardiac medications    2. Follow up with Dr. John Fletcher in 6 months     3. Follow up with Dr. Marcos Don as directed    4. Call with any issues and need for sooner evaluation    5.  Weigh yourself daily    -Stay Hydrated    -Diet should sodium restricted to 2 grams    -Again watch your daily weight trends and if you gain water weight please follow below instructions.    -If you gain 3-5 pounds in 2-3 days OR notice that you are retaining fluid in anyway just like you did before then take an extra dose of your water pill (bumetanide/Bumex) every day until you lose the weight or feel better.    -If you notice that you have taken more than 2 extra doses in 1 week then please call and let us know. -If at any time you feel that you are retaining fluid, your medications are not working, or you feel ill in anyway, then please call us for either same day appointment or the next day, and for instructions. Our goal is to keep you out of the emergency room and the hospital and we have ways to do it. You just need to call us in a timely manner.     -If you become sick for other reasons, and notice that you are not urinating as much, the urine is very dark, you have significant diarrhea or vomiting, then please DO NOT take your water pill and CALL US immediately. Maddie MENA-CNP  [facility].

## 2021-03-23 ENCOUNTER — NURSE ONLY (OUTPATIENT)
Dept: NON INVASIVE DIAGNOSTICS | Age: 61
End: 2021-03-23
Payer: COMMERCIAL

## 2021-03-23 DIAGNOSIS — I50.22 CHRONIC SYSTOLIC CONGESTIVE HEART FAILURE (HCC): ICD-10-CM

## 2021-03-23 DIAGNOSIS — I42.8 NONISCHEMIC CARDIOMYOPATHY (HCC): ICD-10-CM

## 2021-03-23 DIAGNOSIS — Z95.810 BIVENTRICULAR ICD (IMPLANTABLE CARDIOVERTER-DEFIBRILLATOR) IN PLACE: Primary | ICD-10-CM

## 2021-03-23 PROCEDURE — 93296 REM INTERROG EVL PM/IDS: CPT | Performed by: INTERNAL MEDICINE

## 2021-03-23 PROCEDURE — 93297 REM INTERROG DEV EVAL ICPMS: CPT | Performed by: INTERNAL MEDICINE

## 2021-03-23 PROCEDURE — 93295 DEV INTERROG REMOTE 1/2/MLT: CPT | Performed by: INTERNAL MEDICINE

## 2021-04-05 NOTE — PROGRESS NOTES
See [employer] [place] report. Remote monitoring reviewed over a 90 day period. End of 90 day monitoring period date of service 3-.

## 2021-04-09 ENCOUNTER — NURSE ONLY (OUTPATIENT)
Dept: NON INVASIVE DIAGNOSTICS | Age: 61
End: 2021-04-09
Payer: COMMERCIAL

## 2021-04-09 DIAGNOSIS — I42.8 NONISCHEMIC CARDIOMYOPATHY (HCC): ICD-10-CM

## 2021-04-09 DIAGNOSIS — Z95.810 BIVENTRICULAR ICD (IMPLANTABLE CARDIOVERTER-DEFIBRILLATOR) IN PLACE: Primary | ICD-10-CM

## 2021-04-09 DIAGNOSIS — I50.22 CHRONIC SYSTOLIC CONGESTIVE HEART FAILURE (HCC): ICD-10-CM

## 2021-04-09 PROCEDURE — 93283 PRGRMG EVAL IMPLANTABLE DFB: CPT | Performed by: INTERNAL MEDICINE

## 2021-05-10 ENCOUNTER — HOSPITAL ENCOUNTER (OUTPATIENT)
Age: 61
Discharge: HOME | End: 2021-05-10
Payer: COMMERCIAL

## 2021-05-10 LAB
ALBUMIN SERPL-MCNC: 4.7 G/DL (ref 3.5–5.2)
ALP BLD-CCNC: 72 U/L (ref 35–104)
ALT SERPL-CCNC: 16 U/L (ref 0–32)
ANION GAP SERPL CALCULATED.3IONS-SCNC: 14 MMOL/L (ref 7–16)
AST SERPL-CCNC: 21 U/L (ref 0–31)
BILIRUB SERPL-MCNC: 0.6 MG/DL (ref 0–1.2)
BUN BLDV-MCNC: 19 MG/DL (ref 6–20)
CALCIUM SERPL-MCNC: 9.6 MG/DL (ref 8.6–10.2)
CHLORIDE BLD-SCNC: 97 MMOL/L (ref 98–107)
CHOLESTEROL, TOTAL: 217 MG/DL (ref 0–199)
CO2: 28 MMOL/L (ref 22–29)
CREAT SERPL-MCNC: 1 MG/DL (ref 0.5–1)
GFR AFRICAN AMERICAN: >60
GFR NON-AFRICAN AMERICAN: >60 ML/MIN/1.73
GLUCOSE BLD-MCNC: 99 MG/DL (ref 74–99)
HCT VFR BLD CALC: 34.5 % (ref 34–48)
HDLC SERPL-MCNC: 89 MG/DL
HEMOGLOBIN: 11.7 G/DL (ref 11.5–15.5)
LDL CHOLESTEROL CALCULATED: 108 MG/DL (ref 0–99)
MCH RBC QN AUTO: 28.8 PG (ref 26–35)
MCHC RBC AUTO-ENTMCNC: 33.9 % (ref 32–34.5)
MCV RBC AUTO: 85 FL (ref 80–99.9)
PDW BLD-RTO: 13.9 FL (ref 11.5–15)
PLATELET # BLD: 211 E9/L (ref 130–450)
PMV BLD AUTO: 10.6 FL (ref 7–12)
POTASSIUM SERPL-SCNC: 3.5 MMOL/L (ref 3.5–5)
RBC # BLD: 4.06 E12/L (ref 3.5–5.5)
SODIUM BLD-SCNC: 139 MMOL/L (ref 132–146)
TOTAL PROTEIN: 7.7 G/DL (ref 6.4–8.3)
TRIGL SERPL-MCNC: 98 MG/DL (ref 0–149)
VLDLC SERPL CALC-MCNC: 20 MG/DL
WBC # BLD: 5.2 E9/L (ref 4.5–11.5)

## 2021-05-10 PROCEDURE — 36415 COLL VENOUS BLD VENIPUNCTURE: CPT

## 2021-05-10 PROCEDURE — 80053 COMPREHEN METABOLIC PANEL: CPT

## 2021-05-10 PROCEDURE — 85027 COMPLETE CBC AUTOMATED: CPT

## 2021-05-10 PROCEDURE — 80061 LIPID PANEL: CPT

## 2021-07-11 NOTE — PROGRESS NOTES
[facility],[address] and [address] [facility] Electrophysiology  Outpatient Progress [place]te  PATIENT: Giovanni Chun  MEDICAL RECORD NUMBER: <[id number]>  DATE OF SERVICE:  7/12/2021  ELECTROPHYSIOLOGIST: Miya Espino MD  REFERRING PHYSICIAN: No ref. provider found and Violeta Huertas MD  CHIEF COMPLAINT: CRT-D in situ    HPI: This is a 64 y.o. female with a history of   Patient Active Problem List   Diagnosis    Admitted for acute congestive heart failure ([address]    Hyperglycemia    Pleural effusion on right    Elevated blood pressure reading without diagnosis of hypertension    Hypokalemia    New onset of congestive heart failure ([address]    Abnormal EKG    Nonischemic cardiomyopathy ([address]    Viral cardiomyopathy ([address]    History of implantable cardioverter-defibrillator (ICD) placement   who presents to cardiac electrophysiology clinic for follow up and management of CRT-D in situ. The patient underwent CRT-D implantation on 12/18/20 for primary prevention of SCD. She states that she does not feel any different after the procedure. The patient currently feels well but does report NELSON. She reports lightheadedness when getting up to fast. She offers no complaints from a device POV. The device site looks well healed and free from infection or erosion. The patient denies any chest pain, dyspnea, palpitations, dizziness, syncope, orthopnea or paroxysmal nocturnal dyspnea. The patient continues to be followed remotely.      Patient Active Problem List    Diagnosis Date Noted    History of implantable cardioverter-defibrillator (ICD) placement 12/18/2020    Abnormal EKG     Nonischemic cardiomyopathy ([address]     Viral cardiomyopathy ([address]     New onset of congestive heart failure ([address]     Hypokalemia     Admitted for acute congestive heart failure ([address] 01/13/2020    Hyperglycemia 01/13/2020    Pleural effusion on right 01/13/2020    Elevated blood pressure reading without diagnosis of hypertension 01/13/2020       Past Medical History:   Diagnosis Date    CHF [facility]congestive heart failure) [facility])     [facility]FrEF [facility] failure with reduced [facility] fraction[facility] 01/17/2020 1/14/20- echo- LVEF 15%, stage I DD, RVSF reduced, LA severely dilated, LBBB, severe MR, mild TR, :LVDD: 6.5, RVDD: 3.0    LBBB (left bundle branch block)     Nonischemic cardiomyopathy [facility]        Family History   Problem Relation Age of Onset    Stroke Mother     No Known Problems Sister     No Known Problems Sister     No Known Problems Sister        Social History     Tobacco Use    Smoking status: Never Smoker    Smokeless tobacco: Never Used   Substance Use Topics    Alcohol use: Not Currently     Comment: socially wine. stopped wine with cardiac issues. Current Outpatient Medications   Medication Sig Dispense Refill    atorvastatin (LIPITOR) 20 MG tablet TAKE 1 TABLET BY MOUTH EVERY DAY      OYSCO 500 + D 500-200 MG-UNIT per tablet TAKE 1 TABLET BY MOUTH TWICE DAILY FOR 30 DAYS      vitamin D3 (CHOLECALCIFEROL) 25 MCG (1000 UT) TABS tablet TAKE 1 TABLET BY MOUTH EVERY DAY      ciclopirox (PENLAC) 8 % solution APPLY AS DIRECTED      bumetanide (BUMEX) 2 MG tablet Take 1 tablet by mouth daily as needed (>3# weight gain in a day, increased shortness of breath or swelling.) 90 tablet 3    metoprolol succinate (TOPROL XL) 50 MG extended release tablet Take 1 tablet by mouth daily 90 tablet 3    spironolactone (ALDACTONE) 25 MG tablet Take 1 tablet by mouth daily 90 tablet 3    sacubitril-valsartan (ENTRESTO)  MG per tablet Take 1 tablet by mouth 2 times daily 180 tablet 6    pantoprazole (PROTONIX) 40 MG tablet Take 40 mg by mouth every morning PCP manages script, via [facility].  Acetaminophen (TYLENOL CHILDRENS PO) Take 320 mg by mouth as needed (headache.) childrens tylenol wild berry powder. 160mg per packet.  Uses 2 packets as needed       No current facility-administered medications for this visit. No Known Allergies    ROS:   Constitutional: Negative for fever, activity change and appetite change. HENT: Negative for epistaxis. Eyes: Negative for diploplia, blurred vision. Respiratory: Negative for cough, chest tightness, shortness of breath and wheezing. Cardiovascular: pertinent positives in HPI  Gastrointestinal: Negative for abdominal pain and blood in stool. All other review of systems are negative     PHYSICAL EXAM:   Vitals:    21 0958   BP: 96/60   Site: Right Upper Arm   Position: Sitting   Cuff Size: Small Adult   Pulse: 74   Resp: 18   SpO2: 99%   Weight: 119 lb 3.2 oz (54.1 kg)   Height: 5' 4\" (1.626 m)   Constitutional: Well-developed, no acute distress  Eyes: conjunctivae normal, no xanthelasma   Ears, Nose, Throat: oral mucosa moist, no cyanosis   CV: no JVD. Regular rate and rhythm. Normal S1S2 and no S3. No murmurs, rubs, or gallops. PMI is nondisplaced  Lungs: clear to auscultation bilaterally, normal respiratory effort without used of accessory muscles  Abdomen: soft, non-tender, bowel sounds present, no masses or hepatomegaly   Musculoskeletal: no digital clubbing, no edema   Skin: warm, no rashes   CRT-D site: well healed, NO erosion, infection or migration    I have personally reviewed the laboratory, cardiac diagnostic and radiographic testing as outlined below:    Data:    No results for input(s): WBC, HGB, HCT, PLT in the last 72 hours. No results for input(s): NA, K, CL, CO2, BUN, CREATININE, CALCIUM in the last 72 hours. Invalid input(s): GLU, MAGNESIUM   Lab Results   Component Value Date    MG 2.1 2020     No results for input(s): TSH in the last 72 hours. No results for input(s): INR in the last 72 hours. EK21: SR with BiV paced rate: 75 bpm,  ms  - Please see scan in Cardiology. Echocardiogram 10/12/20:    Findings      Left Ventricle   Mild left ventricular dilatation.    Severe global hypokinesis, abnormal septal motion, EF estimated about 30   +/-3%. Stage I diastolic dysfunction. No wall motion abnormalities. Right Ventricle   Normal right ventricle size and function. Left Atrium   Left atrium is of normal size. Interatrial septum not well visualized but appears intact. Right Atrium   Normal right atrium. Mitral Valve   Normal mitral valve structure. There is mild to moderate regurgitation - ERO 0.2cm2, PISA 0.7cm, RV 29cc. No mitral valve prolapse. Tricuspid Valve   Normal tricuspid valve structure. There is mild tricuspid regurgitation, RVSP 18mmHg. Aortic Valve   Normal aortic valve structure and function. No hemodynamically significant aortic stenosis is present. Pulmonic Valve   The pulmonic valve was not well visualized. Pericardial Effusion   No evidence of pericardial effusion. Pericardium appears normal.      Pleural Effusion   No evidence of pleural effusion. Aorta   Normal aortic root size and ascending aorta. Miscellaneous   The inferior vena cava diameter is normal with normal respiratory   variation. Conclusions      Summary   Mild left ventricular dilatation. Severe global hypokinesis, abnormal septal motion, EF estimated about 30   +/-3%. Stage I diastolic dysfunction. Interatrial septum not well visualized but appears intact. Normal right ventricle size and function. There is mild to moderate regurgitation - ERO 0.2cm2, PISA 0.7cm, RV 29cc. No mitral valve prolapse. No hemodynamically significant aortic stenosis is present. There is mild tricuspid regurgitation, RVSP 18mmHg. Normal aortic root size. No evidence of pericardial effusion. No intra cardiac mass or thrombus. Compared to prior echo from 6/12/20 which showed EF 25%.       Signature      ----------------------------------------------------------------   Electronically signed by Jesse Demarco MD(Interpreting   physician) on 10/13/2020 12:20 PM ----------------------------------------------------------------     M-Mode/2D Measurements & Calculations      LV Diastolic    LV Systolic Dimension: 4.6   AV Cusp Separation: 1.4 cmLA   Dimension: 5.5  cm                           Dimension: 2.8 cmAO Root   cm              LV Volume Diastolic: 849.3   Dimension: 2.8 cm   LV FS:16.4 %    ml   LV PW           LV Volume Systolic: 69.8 ml   Diastolic: 1 cm LV EDV/LV EDV Index: 148.5   Septum          ml/95 ml/m^2LV ESV/LV ESV    RV Diastolic Dimension: 2 cm   Diastolic: 0.9  Index: 81.5 ml/63ml/ m^2   cm              EF Calculated: 33.5 %                   LV Mass Index: 127 l/min*m^2 LA volume/Index: 39.3 ml   LV Mass: 199.32 LV Length: 8.6 cm            RA Area: 9.7 cm^2   g                   LVOT: 2.3 cm     Doppler Measurements & Calculations      MV Peak E-Wave: 0.62 m/s   AV Peak Velocity:    LVOT Peak Velocity: 0.95   MV Peak A-Wave: 0.85 m/s   1.47 m/s             m/s   MV E/A Ratio: 0.73         AV Peak Gradient:    LVOT Mean Velocity: 0.56   MV Peak Gradient: 2.2 mmHg 8.58 mmHg            m/s   MV Mean Gradient: 0.7 mmHg AV Mean Velocity:    LVOT Peak Gradient: 3.6   MV Mean Velocity: 0.41 m/s 0.95 m/s             mmHgLVOT Mean Gradient:   MV Deceleration Time:      AV Mean Gradient:    1.5 mmHg   235.7 msec                 3.9 mmHg             Estimated RVSP: 17.9 mmHg   MV P1/2t: 55.1 msec        AV VTI: 27.3 cm      Estimated RAP:3 mmHg   MVA by PHT:3.99 cm^2       AV Area   MV Area (continuity): 2.2  (Continuity):2.49   cm^2                       cm^2                 TR Velocity:1.93 m/s                                                   TR Gradient:14.85 mmHg                              LVOT VTI: 16.4 cm    PV Peak Velocity: 0.8 m/s   MR Velocity: 3.69 m/s                           PV Peak Gradient: 2.55   MV REUBEN PISA: 0.21 cm^2     Estimated PASP:      mmHg   MR VTI: 138.6 cm           17.85 mmHg           PV Mean Velocity: 0.54 m/s   Alias Velocity: 0.25                            PV Mean Gradient: 1.3 mmHg   m/sPISA Radius: 0.7 cm      PISA area: 3.08 cm^2MR   flow rate: 78.23 ml/sMR   volume:29.11 ml    Cardiac Catheterization 1/16/20:   INDICATION:  Severe LV dysfunction with severe mitral regurgitation.     PROCEDURE NOTE:  The patient was brought to the cardiac cath lab in her  usual fasting state. Under sterile condition and under local anesthetic  and using conscious sedation, I failed to access the small right radial  artery. Then, a 5-[place] micropuncture kit was used and a 5-[place]  sheath was inserted into the right common femoral artery. Then, a  5-[place] JL3.5 diagnostic catheter was advanced to the ascending aorta  and the left main coronary artery was engaged and a coronary angiogram  was done. This catheter was exchanged over a guidewire to a 5-[place]  JR4 diagnostic catheter which was able to engage the right coronary  artery and a coronary angiogram was done. This catheter was exchanged  over a guidewire to a 5-[place] pigtail which was advanced into the left  ventricle without difficulty. The left ventricular end-diastolic  pressure was measured. Left ventriculogram was done. There was no  significant gradient across the aortic valve. At the end of the  procedure, the catheter was pulled out. The right groin sheath was  pulled out and manual pressure was applied over the right groin with  good hemostasis. The patient tolerated the procedure very well and no  complications were noted. No significant bleeding occurred during the  procedure.     FINDINGS:  HEMODYNAMICS:  1. Aortic pressure 104/85 mmHg. 2.  Left ventricular end-diastolic pressure 37 mmHg.     CORONARY ANATOMY:  1. Left main:  It is medium in size with no angiographic stenosis  noted. 2.  LAD:  It is large in caliber and wraps around the apex and gives  rise to one diagonal branch. No angiographic stenosis noted. 3.  Ramus: It is a very small branch.   4.  Left providers, preparation for the clinic visit, reviewing records/tests, counseling/education of the patient, ordering medications/tests/procedures, coordinating care, and documenting clinical information in the EHR. Thank you for allowing me to participate in your patient's care. Please call me if there are any questions or concerns.       Jamilah Khan MD  Cardiac Electrophysiology  [address]   [facility]  10:13 AM  7/12/2021

## 2021-07-12 ENCOUNTER — OFFICE VISIT (OUTPATIENT)
Dept: NON INVASIVE DIAGNOSTICS | Age: 61
End: 2021-07-12
Payer: COMMERCIAL

## 2021-07-12 VITALS
HEART RATE: 74 BPM | RESPIRATION RATE: 18 BRPM | SYSTOLIC BLOOD PRESSURE: 96 MMHG | HEIGHT: 64 IN | OXYGEN SATURATION: 99 % | WEIGHT: 119.2 LBS | BODY MASS INDEX: 20.35 KG/M2 | DIASTOLIC BLOOD PRESSURE: 60 MMHG

## 2021-07-12 DIAGNOSIS — I42.8 NONISCHEMIC CARDIOMYOPATHY (HCC): ICD-10-CM

## 2021-07-12 DIAGNOSIS — I50.22 CHRONIC SYSTOLIC CONGESTIVE HEART FAILURE (HCC): ICD-10-CM

## 2021-07-12 DIAGNOSIS — Z95.810 BIVENTRICULAR ICD (IMPLANTABLE CARDIOVERTER-DEFIBRILLATOR) IN PLACE: Primary | ICD-10-CM

## 2021-07-12 PROCEDURE — 1036F TOBACCO NON-USER: CPT | Performed by: INTERNAL MEDICINE

## 2021-07-12 PROCEDURE — 93290 INTERROG DEV EVAL ICPMS IP: CPT | Performed by: INTERNAL MEDICINE

## 2021-07-12 PROCEDURE — 99215 OFFICE O/P EST HI 40 MIN: CPT | Performed by: INTERNAL MEDICINE

## 2021-07-12 PROCEDURE — 93284 PRGRMG EVAL IMPLANTABLE DFB: CPT | Performed by: INTERNAL MEDICINE

## 2021-07-12 PROCEDURE — G8427 DOCREV CUR MEDS BY ELIG CLIN: HCPCS | Performed by: INTERNAL MEDICINE

## 2021-07-12 PROCEDURE — G8420 CALC BMI NORM PARAMETERS: HCPCS | Performed by: INTERNAL MEDICINE

## 2021-07-12 PROCEDURE — 3017F COLORECTAL CA SCREEN DOC REV: CPT | Performed by: INTERNAL MEDICINE

## 2021-08-02 DIAGNOSIS — I50.22 CHRONIC HFREF (HEART FAILURE WITH REDUCED EJECTION FRACTION) (HCC): ICD-10-CM

## 2021-08-02 RX ORDER — SPIRONOLACTONE 25 MG/1
25 TABLET ORAL DAILY
Qty: 90 TABLET | Refills: 3 | Status: SHIPPED
Start: 2021-08-02 | End: 2022-08-05 | Stop reason: SDUPTHER

## 2021-08-02 RX ORDER — METOPROLOL SUCCINATE 50 MG/1
50 TABLET, EXTENDED RELEASE ORAL DAILY
Qty: 90 TABLET | Refills: 3 | Status: SHIPPED
Start: 2021-08-02 | End: 2022-08-05 | Stop reason: SDUPTHER

## 2021-09-21 NOTE — PROGRESS NOTES
"Former patient of [facility] & has not established care with another provider.  Please assign refill request to covering provider per clinic standard process.    Routing refill request to provider for review/approval because:  Labs out of range:  BP  No pcp    Last Written Prescription Date:  7/25/21  Last Fill Quantity: 30,  # refills: 0   Last office visit provider:  4/16/21     Requested Prescriptions   Pending Prescriptions Disp Refills     losartan (COZAAR) 100 MG tablet [Pharmacy Med Name: LOSARTAN 100MG TABLETS] 30 tablet 0     Sig: TAKE 1 TABLET(100 MG) BY MOUTH DAILY       Angiotensin-II Receptors Failed - 9/21/2021  3:34 AM        Failed - Last blood pressure under 140/90 in past 12 months     BP Readings from Last 3 Encounters:   10/27/18 (!) 153/93   10/05/18 138/88   09/26/18 (!) 141/94                 Failed - Recent (12 mo) or future (30 days) visit within the authorizing provider's specialty     Patient has had an office visit with the authorizing provider or a provider within the authorizing providers department within the previous 12 mos or has a future within next 30 days. See \"Patient Info\" tab in inbasket, or \"Choose Columns\" in Meds & Orders section of the refill encounter.              Passed - Medication is active on med list        Passed - Patient is age 18 or older        Passed - Normal serum creatinine on file in past 12 months     Recent Labs   Lab Test 07/26/21  1406   CR 0.75       Ok to refill medication if creatinine is low          Passed - Normal serum potassium on file in past 12 months     Recent Labs   Lab Test 07/26/21  1406   POTASSIUM 4.3                         Ambrosio Mcgrath RN 09/21/21 2:07 PM  " INPATIENT CARDIOLOGY FOLLOW-UP    Name: Herson Graham    Age: 64 y.o. Date of Admission: 1/13/2020  7:02 AM    Date of Service: 1/17/2020    Chief Complaint: Follow-up for nonischemic cardiomyopathy, acute systolic heart failure    Interim History: The patient continues symptomatic improvement with additional fluid mobilization with present continuous infusion diuretics. She remains hemodynamically stable and tolerant of her medical regimen with coronary angiography demonstrating no evidence of coronary atherosclerosis and increased left ventricular end-diastolic pressures. Review of Systems: The remainder of a complete multisystem review including consitutional, central nervous, respiratory, circulatory, gastrointestinal, genitourinary, endocrinologic, hematologic, musculoskeletal and psychiatric are negative.     Problem List:  Patient Active Problem List   Diagnosis    Admitted for acute congestive heart failure (HCC)    Hyperglycemia    Pleural effusion on right    Elevated blood pressure reading without diagnosis of hypertension    Hypokalemia    New onset of congestive heart failure (HCC)       Allergies:  No Known Allergies    Current Medications:  Current Facility-Administered Medications   Medication Dose Route Frequency Provider Last Rate Last Dose    metoprolol succinate (TOPROL XL) extended release tablet 25 mg  25 mg Oral BID Willis Roberson MD   25 mg at 01/16/20 2027    spironolactone (ALDACTONE) tablet 25 mg  25 mg Oral Daily Willis Roberson MD        sodium chloride flush 0.9 % injection 10 mL  10 mL Intravenous 2 times per day Dorota Love MD   10 mL at 01/16/20 2027    sodium chloride flush 0.9 % injection 10 mL  10 mL Intravenous PRN Dorota Love MD        acetaminophen (TYLENOL) tablet 650 mg  650 mg Oral Q4H PRN Dorota Love MD        magnesium hydroxide (MILK OF MAGNESIA) 400 MG/5ML suspension 30 mL  30 mL Oral Daily PRN Dorota Love MD        bumetanide (BUMEX) 12.5 mg in sodium chloride 0.9 % 125 mL infusion  0.5 mg/hr Intravenous Continuous Madlyn Pencil, APRN - CNP 5 mL/hr at 01/16/20 1752 0.5 mg/hr at 01/16/20 1752    pantoprazole sodium (PROTONIX) packet 40 mg  40 mg Oral QAM AC [facility] Martha, DO   40 mg at 01/17/20 0701    docusate sodium (COLACE) 150 MG/15ML liquid 100 mg  100 mg Oral Nightly [facility], DO   100 mg at 01/16/20 2027    zolpidem (AMBIEN) tablet 5 mg  5 mg Oral Nightly PRN Chela Balboa, DO   5 mg at 01/16/20 2243    sacubitril-valsartan (ENTRESTO) 24-26 MG per tablet 1 tablet  1 tablet Oral BID Madlyn Pencil, APRN - CNP        perflutren lipid microspheres (DEFINITY) injection 1.65 mg  1.5 mL Intravenous ONCE PRN [facility], DO        albuterol (PROVENTIL) nebulizer solution 2.5 mg  2.5 mg Nebulization 4x daily [facility], DO   2.5 mg at 01/16/20 1938    enoxaparin (LOVENOX) injection 40 mg  40 mg Subcutaneous Daily [facility], DO   40 mg at 01/16/20 0841      bumetanide 0.1 mg/mL infusion 0.5 mg/hr (01/16/20 1752)       Physical Exam:  BP (!) 90/54   Pulse 81   Temp 97.4 °F (36.3 °C) (Temporal)   Resp 16   Ht 5' 4\" (1.626 m)   Wt 114 lb 4.8 oz (51.8 kg)   SpO2 97%   BMI 19.62 kg/m²   Weight change: -4 lb 14.4 oz (-2.223 kg)  Wt Readings from Last 3 Encounters:   01/17/20 114 lb 4.8 oz (51.8 kg)     The patient is awake, alert and in no discomfort or distress. No gross musculoskeletal deformity is present. No significant skin or nail changes are present. Gross examination of head, eyes, nose and throat are negative. Jugular venous pressure is normal and no carotid bruits are present. Normal respiratory effort is noted with no accessory muscle usage present. Lung fields are clear to ascultation. Cardiac examination is notable for a regular rate and rhythm with no palpable thrill. Soft third heart sound and no cardiac murmur are identified.  A benign abdominal examination is present with no masses or organomegaly. Intact pulses are present throughout all extremities and no peripheral edema is present. No focal neurologic deficits are present. Intake/Output:    Intake/Output Summary (Last 24 hours) at 1/17/2020 0719  Last data filed at 1/17/2020 5324  Gross per 24 hour   Intake 660 ml   Output 2900 ml   Net -2240 ml     I/O this shift:  In: 120 [P.O.:120]  Out: -     Laboratory Tests:  Lab Results   Component Value Date    CREATININE 0.8 01/17/2020    BUN 20 01/17/2020     01/17/2020    K 3.6 01/17/2020    CL 93 (L) 01/17/2020    CO2 28 01/17/2020     No results for input(s): CKTOTAL, CKMB in the last 72 hours. Invalid input(s): TROPONONI  No results found for: BNP  Lab Results   Component Value Date    WBC 9.2 01/13/2020    RBC 4.69 01/13/2020    HGB 13.3 01/13/2020    HCT 41.4 01/13/2020    MCV 88.3 01/13/2020    MCH 28.4 01/13/2020    MCHC 32.1 01/13/2020    RDW 15.1 01/13/2020     01/13/2020    MPV 10.7 01/13/2020     No results for input(s): ALKPHOS, ALT, AST, PROT, BILITOT, BILIDIR, LABALBU in the last 72 hours. No results found for: MG  No results found for: PROTIME, INR  Lab Results   Component Value Date    TSH 2.340 01/16/2020     No components found for: CHLPL  No results found for: TRIG  No results found for: HDL  No results found for: [address]    Cardiac Tests:  Telemetry findings reviewed: sinus rhythm, no new tachy/bradyarrhythmias overnight  Last cardiac catheterization: Coronary angiography demonstrated no evidence of coronary atherosclerosis with severe left ventricular systolic dysfunction elevated left ventricular end-diastolic pressures    ASSESSMENT / PLAN: On a clinical basis, the patient remains symptomatically stable with ongoing fluid mobilization and stable renal function electrolytes.   Further optimization of her medical regimen has been performed with plans of the initiation of sacubitril-valsartan and spironolactone today with further modification

## 2021-11-02 ENCOUNTER — TELEPHONE (OUTPATIENT)
Dept: NON INVASIVE DIAGNOSTICS | Age: 61
End: 2021-11-02

## 2021-11-09 ENCOUNTER — TELEPHONE (OUTPATIENT)
Dept: NON INVASIVE DIAGNOSTICS | Age: 61
End: 2021-11-09

## 2021-11-09 NOTE — TELEPHONE ENCOUNTER
Returned patient's call. She states her electricity went out. When it came back on she noticed her transmitter sent a remote device transmission. She wanted to know if she will be billed for that check. I informed her she will only be billed every 91 days. She verbalized understanding.     Leonid Mcnamara RN, BSN  [facility]

## 2021-12-06 ENCOUNTER — HOSPITAL ENCOUNTER (OUTPATIENT)
Dept: CARDIOLOGY | Age: 61
Discharge: HOME | End: 2021-12-06
Payer: COMMERCIAL

## 2021-12-06 ENCOUNTER — HOSPITAL ENCOUNTER (OUTPATIENT)
Age: 61
Discharge: HOME | End: 2021-12-06
Payer: COMMERCIAL

## 2021-12-06 DIAGNOSIS — I50.9 NEW ONSET OF CONGESTIVE HEART FAILURE (HCC): ICD-10-CM

## 2021-12-06 LAB
ANION GAP SERPL CALCULATED.3IONS-SCNC: 14 MMOL/L (ref 7–16)
BUN BLDV-MCNC: 14 MG/DL (ref 6–20)
CALCIUM SERPL-MCNC: 9.9 MG/DL (ref 8.6–10.2)
CHLORIDE BLD-SCNC: 97 MMOL/L (ref 98–107)
CO2: 26 MMOL/L (ref 22–29)
CREAT SERPL-MCNC: 0.8 MG/DL (ref 0.5–1)
GFR AFRICAN AMERICAN: >60
GFR NON-AFRICAN AMERICAN: >60 ML/MIN/1.73
GLUCOSE BLD-MCNC: 86 MG/DL (ref 74–99)
LV EF: 43 %
LVEF MODALITY: NORMAL
POTASSIUM SERPL-SCNC: 4.1 MMOL/L (ref 3.5–5)
PRO-BNP: 37 PG/ML (ref 0–125)
SODIUM BLD-SCNC: 137 MMOL/L (ref 132–146)

## 2021-12-06 PROCEDURE — 83880 ASSAY OF NATRIURETIC PEPTIDE: CPT

## 2021-12-06 PROCEDURE — 93306 TTE W/DOPPLER COMPLETE: CPT

## 2021-12-06 PROCEDURE — 36415 COLL VENOUS BLD VENIPUNCTURE: CPT

## 2021-12-06 PROCEDURE — 80048 BASIC METABOLIC PNL TOTAL CA: CPT

## 2021-12-20 ENCOUNTER — HOSPITAL ENCOUNTER (OUTPATIENT)
Age: 61
Discharge: HOME | End: 2021-12-20
Payer: COMMERCIAL

## 2021-12-20 DIAGNOSIS — I42.8 NONISCHEMIC CARDIOMYOPATHY (HCC): ICD-10-CM

## 2021-12-20 LAB
ANION GAP SERPL CALCULATED.3IONS-SCNC: 12 MMOL/L (ref 7–16)
BUN BLDV-MCNC: 19 MG/DL (ref 6–20)
CALCIUM SERPL-MCNC: 9.6 MG/DL (ref 8.6–10.2)
CHLORIDE BLD-SCNC: 101 MMOL/L (ref 98–107)
CO2: 28 MMOL/L (ref 22–29)
CREAT SERPL-MCNC: 0.9 MG/DL (ref 0.5–1)
GFR AFRICAN AMERICAN: >60
GFR NON-AFRICAN AMERICAN: >60 ML/MIN/1.73
GLUCOSE BLD-MCNC: 99 MG/DL (ref 74–99)
POTASSIUM SERPL-SCNC: 3.8 MMOL/L (ref 3.5–5)
SODIUM BLD-SCNC: 141 MMOL/L (ref 132–146)

## 2021-12-20 PROCEDURE — 36415 COLL VENOUS BLD VENIPUNCTURE: CPT

## 2021-12-20 PROCEDURE — 80048 BASIC METABOLIC PNL TOTAL CA: CPT

## 2022-01-03 DIAGNOSIS — I50.22 CHRONIC HFREF (HEART FAILURE WITH REDUCED EJECTION FRACTION) (HCC): ICD-10-CM

## 2022-01-03 RX ORDER — BUMETANIDE 2 MG/1
2 TABLET ORAL DAILY PRN
Qty: 90 TABLET | Refills: 3 | Status: SHIPPED
Start: 2022-01-03 | End: 2022-08-05 | Stop reason: SDUPTHER

## 2022-07-06 DIAGNOSIS — I50.22 CHRONIC HFREF (HEART FAILURE WITH REDUCED EJECTION FRACTION) (HCC): ICD-10-CM

## 2022-07-06 RX ORDER — SPIRONOLACTONE 25 MG/1
25 TABLET ORAL DAILY
Qty: 90 TABLET | Refills: 3 | OUTPATIENT
Start: 2022-07-06

## 2022-07-30 DIAGNOSIS — I50.22 CHRONIC HFREF (HEART FAILURE WITH REDUCED EJECTION FRACTION) (HCC): ICD-10-CM

## 2022-08-01 RX ORDER — METOPROLOL SUCCINATE 50 MG/1
50 TABLET, EXTENDED RELEASE ORAL DAILY
Qty: 90 TABLET | Refills: 3 | OUTPATIENT
Start: 2022-08-01

## 2022-08-03 DIAGNOSIS — I50.22 CHRONIC HFREF (HEART FAILURE WITH REDUCED EJECTION FRACTION) (HCC): ICD-10-CM

## 2022-08-04 RX ORDER — METOPROLOL SUCCINATE 50 MG/1
50 TABLET, EXTENDED RELEASE ORAL DAILY
Qty: 90 TABLET | Refills: 3 | OUTPATIENT
Start: 2022-08-04

## 2022-08-04 RX ORDER — SPIRONOLACTONE 25 MG/1
25 TABLET ORAL DAILY
Qty: 90 TABLET | Refills: 3 | OUTPATIENT
Start: 2022-08-04

## 2022-08-22 ENCOUNTER — HOSPITAL ENCOUNTER (OUTPATIENT)
Age: 62
Discharge: HOME | End: 2022-08-22
Payer: COMMERCIAL

## 2022-08-22 LAB
ALBUMIN SERPL-MCNC: 4.7 G/DL (ref 3.5–5.2)
ALP BLD-CCNC: 74 U/L (ref 35–104)
ALT SERPL-CCNC: 11 U/L (ref 0–32)
ANION GAP SERPL CALCULATED.3IONS-SCNC: 13 MMOL/L (ref 7–16)
AST SERPL-CCNC: 18 U/L (ref 0–31)
BASOPHILS ABSOLUTE: 0.04 E9/L (ref 0–0.2)
BASOPHILS RELATIVE PERCENT: 0.6 % (ref 0–2)
BILIRUB SERPL-MCNC: 0.6 MG/DL (ref 0–1.2)
BUN BLDV-MCNC: 14 MG/DL (ref 6–20)
CALCIUM SERPL-MCNC: 9.6 MG/DL (ref 8.6–10.2)
CHLORIDE BLD-SCNC: 100 MMOL/L (ref 98–107)
CHOLESTEROL, TOTAL: 205 MG/DL (ref 0–199)
CO2: 27 MMOL/L (ref 22–29)
CREAT SERPL-MCNC: 0.8 MG/DL (ref 0.5–1)
EOSINOPHILS ABSOLUTE: 0.06 E9/L (ref 0.05–0.5)
EOSINOPHILS RELATIVE PERCENT: 0.9 % (ref 0–6)
GFR AFRICAN AMERICAN: >60
GFR NON-AFRICAN AMERICAN: >60 ML/MIN/1.73
GLUCOSE BLD-MCNC: 92 MG/DL (ref 74–99)
HCT VFR BLD CALC: 37 % (ref 34–48)
HDLC SERPL-MCNC: 74 MG/DL
HEMOGLOBIN: 12.6 G/DL (ref 11.5–15.5)
IMMATURE GRANULOCYTES #: 0.02 E9/L
IMMATURE GRANULOCYTES %: 0.3 % (ref 0–5)
LDL CHOLESTEROL CALCULATED: 117 MG/DL (ref 0–99)
LYMPHOCYTES ABSOLUTE: 1.21 E9/L (ref 1.5–4)
LYMPHOCYTES RELATIVE PERCENT: 17.9 % (ref 20–42)
MCH RBC QN AUTO: 28.1 PG (ref 26–35)
MCHC RBC AUTO-ENTMCNC: 34.1 % (ref 32–34.5)
MCV RBC AUTO: 82.6 FL (ref 80–99.9)
MONOCYTES ABSOLUTE: 0.39 E9/L (ref 0.1–0.95)
MONOCYTES RELATIVE PERCENT: 5.8 % (ref 2–12)
NEUTROPHILS ABSOLUTE: 5.04 E9/L (ref 1.8–7.3)
NEUTROPHILS RELATIVE PERCENT: 74.5 % (ref 43–80)
PDW BLD-RTO: 13.9 FL (ref 11.5–15)
PLATELET # BLD: 224 E9/L (ref 130–450)
PMV BLD AUTO: 10.8 FL (ref 7–12)
POTASSIUM SERPL-SCNC: 3.8 MMOL/L (ref 3.5–5)
RBC # BLD: 4.48 E12/L (ref 3.5–5.5)
SODIUM BLD-SCNC: 140 MMOL/L (ref 132–146)
TOTAL PROTEIN: 7.8 G/DL (ref 6.4–8.3)
TRIGL SERPL-MCNC: 71 MG/DL (ref 0–149)
VITAMIN D 25-HYDROXY: 34 NG/ML (ref 30–100)
VLDLC SERPL CALC-MCNC: 14 MG/DL
WBC # BLD: 6.8 E9/L (ref 4.5–11.5)

## 2022-08-22 PROCEDURE — 36415 COLL VENOUS BLD VENIPUNCTURE: CPT

## 2022-08-22 PROCEDURE — 80053 COMPREHEN METABOLIC PANEL: CPT

## 2022-08-22 PROCEDURE — 82306 VITAMIN D 25 HYDROXY: CPT

## 2022-08-22 PROCEDURE — 80061 LIPID PANEL: CPT

## 2022-08-22 PROCEDURE — 85025 COMPLETE CBC W/AUTO DIFF WBC: CPT

## 2022-12-12 ENCOUNTER — OFFICE VISIT (OUTPATIENT)
Dept: NON INVASIVE DIAGNOSTICS | Age: 62
End: 2022-12-12
Payer: COMMERCIAL

## 2022-12-12 VITALS
BODY MASS INDEX: 20.73 KG/M2 | HEART RATE: 94 BPM | WEIGHT: 117 LBS | SYSTOLIC BLOOD PRESSURE: 92 MMHG | RESPIRATION RATE: 16 BRPM | HEIGHT: 63 IN | DIASTOLIC BLOOD PRESSURE: 54 MMHG

## 2022-12-12 DIAGNOSIS — I42.8 NONISCHEMIC CARDIOMYOPATHY (HCC): ICD-10-CM

## 2022-12-12 DIAGNOSIS — Z95.810 BIVENTRICULAR ICD (IMPLANTABLE CARDIOVERTER-DEFIBRILLATOR) IN PLACE: Primary | ICD-10-CM

## 2022-12-12 DIAGNOSIS — I34.0 MILD MITRAL REGURGITATION BY PRIOR ECHOCARDIOGRAM: ICD-10-CM

## 2022-12-12 PROCEDURE — G8420 CALC BMI NORM PARAMETERS: HCPCS | Performed by: NURSE PRACTITIONER

## 2022-12-12 PROCEDURE — G8484 FLU IMMUNIZE NO ADMIN: HCPCS | Performed by: NURSE PRACTITIONER

## 2022-12-12 PROCEDURE — 99214 OFFICE O/P EST MOD 30 MIN: CPT | Performed by: NURSE PRACTITIONER

## 2022-12-12 PROCEDURE — 93000 ELECTROCARDIOGRAM COMPLETE: CPT | Performed by: INTERNAL MEDICINE

## 2022-12-12 PROCEDURE — G8427 DOCREV CUR MEDS BY ELIG CLIN: HCPCS | Performed by: NURSE PRACTITIONER

## 2022-12-12 PROCEDURE — 3017F COLORECTAL CA SCREEN DOC REV: CPT | Performed by: NURSE PRACTITIONER

## 2022-12-12 PROCEDURE — 1036F TOBACCO NON-USER: CPT | Performed by: NURSE PRACTITIONER

## 2022-12-12 NOTE — PROGRESS NOTES
[address] [facility] Electrophysiology  Outpatient Progress Note  Mae Paredes  1960  Date of Service: 12/12/2022  PCP: Obi Chinchilla MD  Cardiologist: Dr Raphael Flores  Electrophysiologist: Dr. Chicas Aris: Mae Paredes is seen for follow-up and management of: CRT-D     Last seen in the office with Dr. Irina Jackson on 7/11/2021    PMH as noted below significant for NICM, LBBB, MR, and CRT-D in situ. The patient underwent CRT-D implantation on 12/18/20 for primary prevention of SCD. She has no complaints today. She states that she has felt much improvement in symptoms is able to do her daily activities without complaints of shortness of breath. No soboe with walking, no orthopnea, no swelling. She follows with cardiology in [place].       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[facility] [address]    History of implantable cardioverter-defibrillator (ICD) placement       Current Outpatient Medications   Medication Sig Dispense Refill    bumetanide (BUMEX) 2 MG tablet Take 1 tablet by mouth daily as needed (>3# weight gain in a day, increased shortness of breath or swelling.) 90 tablet 3    spironolactone (ALDACTONE) 25 MG tablet Take 1 tablet by mouth daily 90 tablet 3    metoprolol succinate (TOPROL XL) 50 MG extended release tablet Take 1 tablet by mouth daily 90 tablet 3    sacubitril-valsartan (ENTRESTO)  MG per tablet Take 1 tablet by mouth 2 times daily 180 tablet 6    dapagliflozin (FARXIGA) 10 MG tablet Take 1 tablet by mouth every morning 90 tablet 5    atorvastatin (LIPITOR) 20 MG tablet TAKE 1 TABLET BY MOUTH EVERY DAY      OYSCO 500 + D 500-200 MG-UNIT per tablet TAKE 1 TABLET BY MOUTH TWICE DAILY FOR 30 DAYS      vitamin D3 (CHOLECALCIFEROL) 25 MCG (1000 UT) TABS tablet TAKE 1 TABLET BY MOUTH EVERY DAY      pantoprazole (PROTONIX) 40 MG tablet Take 40 mg by mouth every morning PCP manages script, via [facility]. No current facility-administered medications for this visit. No Known Allergies    ROS:   Constitutional: Negative for fever, activity change and appetite change. HENT: Negative for epistaxis. Eyes: Negative for diploplia, blurred vision. Respiratory: Negative for cough, chest tightness, shortness of breath and wheezing. Cardiovascular: pertinent positives in HPI  Gastrointestinal: Negative for abdominal pain and blood in stool. All other review of systems are negative     PHYSICAL EXAM:      Vitals:    12/12/22 0857   BP: (!) 92/54   Pulse: 94   Resp: 16   Weight: 117 lb (53.1 kg)   Height: 5' 3\" (1.6 m)     Constitutional: well-developed, no acute distress  Eyes: conjunctivae normal, no xanthelasma   Ears, Nose, Throat: oral mucosa moist, no cyanosis   CV: no JVD. Regular rate and rhythm. Normal S1S2 and no S3. No murmurs, rubs, or gallops. PMI is nondisplaced  Lungs: clear to auscultation bilaterally, normal respiratory effort without used of accessory muscles  Abdomen: soft, non-tender, bowel sounds present, no masses or hepatomegaly   Musculoskeletal: no digital clubbing, no edema   Skin: warm, no rashes     Data:    No results for input(s): WBC, HGB, HCT, PLT in the last 72 hours. No results for input(s): NA, K, CL, CO2, BUN, CREATININE, GLU, CALCIUM in the last 72 hours. Invalid input(s):  MAGNESIUM  Lab Results   Component Value Date/Time    MG 2.1 12/19/2020 04:37 AM     No results for input(s): TSH in the last 72 hours. No results for input(s): INR in the last 72 hours. EKG: V paced,  ms   Please see scan in Cardiology.     Echocardiogram 12/6/2021:  Summary   Left ventricular internal dimensions were normal in diastole and systole. Abnormal (paradoxical) motion consistent with RV pacemaker. Mild-moderately reduced left ventricular systolic dysfunction. Ejection fraction is visually estimated at 40-45%. There is doppler evidence of stage I diastolic dysfunction. Left ventricular internal dimensions were normal in diastole and systole. Abnormal (paradoxical) motion consistent with RV pacemaker. Mild-moderately reduced left ventricular systolic dysfunction. There is doppler evidence of stage I diastolic dysfunction. Physiologic and/or trace mitral regurgitation is present. There is a small circumferential pericardial effusion noted with   predominant anterior component. Device Interrogation:   Underlying rhythm: Sinus rhythm  Mode: DDDR   Battery Voltage/Longevity: 2.99 V, 6.6 years  Charge time: 3.7 seconds  Pacing: A: 51.5%  RV: 98.7%  LV: 98.5%  P wave: 6.6 mV  Impedance: 380 ohms   Threshold: 0.5 V @0.4 ms  RV R wave: 12.4 mV  Impedance: 494 ohms   Threshold: 0.5 V @0.4 ms  LV R wave: Impedance: 513 ohms   Threshold: 0.75 V @0.4 ms  Episodes: None  Reprogramming included: None  Overall device function is normal    All device programmable settings were evaluated per above and in the scanned document, along with iterative adjustments (capture thresholds) to assess and select the most appropriate final programming to provide for consistent delivery of the appropriate therapy and to verify function of the device. Assessment and plan:    1. CRT-D in situ  - DOI: 12/18/20.  - Indication: primary prevention  - Normal device function.  - BIV paced 98.7  -Feels much better since implant, able to do activity without shortness of breath.      2. Nonischemic cardiomyopathy and HFrEF  - Diagnosed 1/14/20.  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  - LV EF 15% on TTE on 1/14/20.  - LV EF 10-15% on LV gram on 1/16/20.  - LV EF 25% on TTE on 6/12/20.  - LV EF 30% on TTE on 10/12/20.  -LVEF 40-45 as above 2021  - On GDMT including Toprol XL, Entresto and Aldactone and Farxiga  - NYHA class II and ACC stage C  - Euvolemic and compensated. - EKG showed LBBB/IVCD with  msec. - Status post CRT-D 12/18/20. 3. LBBB/IVCD  - Chronic     4. Mitral regurgitation  - Secondary MR.  - Severe MR on TTE 1/14/20; trace on echo in 2021  - Mild to moderate MR on TTE 6/12/20.  - Mild to moderate MR on TTE 10/12/20.     5. Vitamin D deficiency  - On Cholecalciferol. Recommendations:    1. Continue to follow with heart failure in [place] and continue GDMT  2. Remote transmissions every 91 days  3. Office visit in 1 year or sooner if symptoms or concerns      Re-education on importance of well controlled HTN (goal BP < 130/80), adequate weight control (goal BMI of < 27), physical activity consisting of moderate cardiopulmonary exercise up to a goal of 250 min/wk, smoking/tobacco abstinence and limited ETOH intake. I have spent a total of 31 minutes with the patient and the family reviewing the above stated recommendations. And a total of >50% of that time involved face-to-face time providing counseling and or coordination of care with the other providers. Thank you for allowing me to participate in your patient's care. Please call me if there are any questions or concerns.         RAJESH Groves - [facility]  Cardiac Electrophysiology  [facility]) Physicians  [facility] [place]: [place] [facility]  10:01 AM  12/12/2022

## 2023-06-22 ENCOUNTER — TELEPHONE (OUTPATIENT)
Dept: NON INVASIVE DIAGNOSTICS | Age: 63
End: 2023-06-22

## 2023-07-21 ENCOUNTER — NURSE ONLY (OUTPATIENT)
Dept: NON INVASIVE DIAGNOSTICS | Age: 63
End: 2023-07-21
Payer: COMMERCIAL

## 2023-07-21 DIAGNOSIS — Z95.810 ICD (IMPLANTABLE CARDIOVERTER-DEFIBRILLATOR) IN PLACE: Primary | ICD-10-CM

## 2023-07-21 PROCEDURE — 93284 PRGRMG EVAL IMPLANTABLE DFB: CPT | Performed by: INTERNAL MEDICINE

## 2023-07-31 ENCOUNTER — HOSPITAL ENCOUNTER (OUTPATIENT)
Age: 63
Discharge: HOME | End: 2023-07-31
Payer: COMMERCIAL

## 2023-07-31 LAB
25(OH)D3 SERPL-MCNC: 27.9 NG/ML (ref 30–100)
ALBUMIN SERPL-MCNC: 4.7 G/DL (ref 3.5–5.2)
ALP SERPL-CCNC: 76 U/L (ref 35–104)
ALT SERPL-CCNC: 16 U/L (ref 0–32)
ANION GAP SERPL CALCULATED.3IONS-SCNC: 10 MMOL/L (ref 7–16)
AST SERPL-CCNC: 21 U/L (ref 0–31)
BILIRUB SERPL-MCNC: 0.6 MG/DL (ref 0–1.2)
BUN SERPL-MCNC: 13 MG/DL (ref 6–23)
CALCIUM SERPL-MCNC: 9.9 MG/DL (ref 8.6–10.2)
CHLORIDE SERPL-SCNC: 102 MMOL/L (ref 98–107)
CHOLEST SERPL-MCNC: 224 MG/DL
CO2 SERPL-SCNC: 25 MMOL/L (ref 22–29)
CREAT SERPL-MCNC: 0.7 MG/DL (ref 0.5–1)
ERYTHROCYTE [DISTWIDTH] IN BLOOD BY AUTOMATED COUNT: 13.7 % (ref 11.5–15)
GFR SERPL CREATININE-BSD FRML MDRD: >60 ML/MIN/1.73M2
GLUCOSE SERPL-MCNC: 85 MG/DL (ref 74–99)
HBA1C MFR BLD: 5.9 % (ref 4–5.6)
HCT VFR BLD AUTO: 39 % (ref 34–48)
HDLC SERPL-MCNC: 85 MG/DL
HGB BLD-MCNC: 13.1 G/DL (ref 11.5–15.5)
LDLC SERPL CALC-MCNC: 126 MG/DL
MCH RBC QN AUTO: 28.2 PG (ref 26–35)
MCHC RBC AUTO-ENTMCNC: 33.6 G/DL (ref 32–34.5)
MCV RBC AUTO: 84.1 FL (ref 80–99.9)
PLATELET # BLD AUTO: 192 K/UL (ref 130–450)
PMV BLD AUTO: 11.2 FL (ref 7–12)
POTASSIUM SERPL-SCNC: 4.2 MMOL/L (ref 3.5–5)
PROT SERPL-MCNC: 7.6 G/DL (ref 6.4–8.3)
RBC # BLD AUTO: 4.64 M/UL (ref 3.5–5.5)
SODIUM SERPL-SCNC: 137 MMOL/L (ref 132–146)
TRIGL SERPL-MCNC: 64 MG/DL
VLDLC SERPL CALC-MCNC: 13 MG/DL
WBC OTHER # BLD: 5.8 K/UL (ref 4.5–11.5)

## 2023-07-31 PROCEDURE — 80061 LIPID PANEL: CPT

## 2023-07-31 PROCEDURE — 36415 COLL VENOUS BLD VENIPUNCTURE: CPT

## 2023-07-31 PROCEDURE — 82306 VITAMIN D 25 HYDROXY: CPT

## 2023-07-31 PROCEDURE — 83036 HEMOGLOBIN GLYCOSYLATED A1C: CPT

## 2023-07-31 PROCEDURE — 80053 COMPREHEN METABOLIC PANEL: CPT

## 2023-07-31 PROCEDURE — 85027 COMPLETE CBC AUTOMATED: CPT

## 2023-09-30 PROCEDURE — G2066 INTER DEVC REMOTE 30D: HCPCS | Performed by: INTERNAL MEDICINE

## 2023-09-30 PROCEDURE — 93297 REM INTERROG DEV EVAL ICPMS: CPT | Performed by: INTERNAL MEDICINE

## 2023-10-01 PROCEDURE — 93295 DEV INTERROG REMOTE 1/2/MLT: CPT | Performed by: INTERNAL MEDICINE

## 2023-10-01 PROCEDURE — 93296 REM INTERROG EVL PM/IDS: CPT | Performed by: INTERNAL MEDICINE

## 2023-11-13 ENCOUNTER — HOSPITAL ENCOUNTER (OUTPATIENT)
Dept: CARDIOLOGY | Age: 63
Discharge: HOME | End: 2023-11-15
Payer: COMMERCIAL

## 2023-11-13 VITALS
WEIGHT: 120 LBS | HEIGHT: 63 IN | SYSTOLIC BLOOD PRESSURE: 92 MMHG | BODY MASS INDEX: 21.26 KG/M2 | DIASTOLIC BLOOD PRESSURE: 80 MMHG

## 2023-11-13 DIAGNOSIS — I42.8 NONISCHEMIC CARDIOMYOPATHY (HCC): ICD-10-CM

## 2023-11-13 LAB
ECHO AV CUSP MM: 1.5 CM
ECHO AV MEAN GRADIENT: 2 MMHG
ECHO AV MEAN VELOCITY: 0.7 M/S
ECHO AV PEAK GRADIENT: 6 MMHG
ECHO AV PEAK VELOCITY: 1.3 M/S
ECHO AV VTI: 21.6 CM
ECHO BSA: 1.56 M2
ECHO EST RA PRESSURE: 3 MMHG
ECHO LA DIAMETER INDEX: 1.6 CM/M2
ECHO LA DIAMETER: 2.5 CM
ECHO LA VOL A-L A2C: 38 ML (ref 22–52)
ECHO LA VOL A-L A4C: 41 ML (ref 22–52)
ECHO LA VOL MOD A2C: 36 ML (ref 22–52)
ECHO LA VOL MOD A4C: 38 ML (ref 22–52)
ECHO LA VOLUME AREA LENGTH: 40 ML
ECHO LA VOLUME INDEX A-L A2C: 24 ML/M2 (ref 16–34)
ECHO LA VOLUME INDEX A-L A4C: 26 ML/M2 (ref 16–34)
ECHO LA VOLUME INDEX AREA LENGTH: 26 ML/M2 (ref 16–34)
ECHO LA VOLUME INDEX MOD A2C: 23 ML/M2 (ref 16–34)
ECHO LA VOLUME INDEX MOD A4C: 24 ML/M2 (ref 16–34)
ECHO LV EDV A2C: 76 ML
ECHO LV EDV A4C: 71 ML
ECHO LV EDV BP: 75 ML (ref 56–104)
ECHO LV EDV INDEX A4C: 46 ML/M2
ECHO LV EDV INDEX BP: 48 ML/M2
ECHO LV EDV NDEX A2C: 49 ML/M2
ECHO LV EJECTION FRACTION A2C: 49 %
ECHO LV EJECTION FRACTION A4C: 61 %
ECHO LV EJECTION FRACTION BIPLANE: 56 % (ref 55–100)
ECHO LV ESV A2C: 39 ML
ECHO LV ESV A4C: 27 ML
ECHO LV ESV BP: 33 ML (ref 19–49)
ECHO LV ESV INDEX A2C: 25 ML/M2
ECHO LV ESV INDEX A4C: 17 ML/M2
ECHO LV ESV INDEX BP: 21 ML/M2
ECHO LV FRACTIONAL SHORTENING: 31 % (ref 28–44)
ECHO LV INTERNAL DIMENSION DIASTOLE INDEX: 2.5 CM/M2
ECHO LV INTERNAL DIMENSION DIASTOLIC: 3.9 CM (ref 3.9–5.3)
ECHO LV INTERNAL DIMENSION SYSTOLIC INDEX: 1.73 CM/M2
ECHO LV INTERNAL DIMENSION SYSTOLIC: 2.7 CM
ECHO LV IVSD: 0.9 CM (ref 0.6–0.9)
ECHO LV MASS 2D: 97.4 G (ref 67–162)
ECHO LV MASS INDEX 2D: 62.4 G/M2 (ref 43–95)
ECHO LV POSTERIOR WALL DIASTOLIC: 0.8 CM (ref 0.6–0.9)
ECHO LV RELATIVE WALL THICKNESS RATIO: 0.41
ECHO MV A VELOCITY: 0.75 M/S
ECHO MV E DECELERATION TIME (DT): 247.4 MS
ECHO MV E VELOCITY: 0.58 M/S
ECHO MV E/A RATIO: 0.77
ECHO MV MAX VELOCITY: 0.7 M/S
ECHO MV MEAN GRADIENT: 1 MMHG
ECHO MV MEAN VELOCITY: 0.4 M/S
ECHO MV PEAK GRADIENT: 2 MMHG
ECHO MV VTI: 19.9 CM
ECHO RIGHT VENTRICULAR SYSTOLIC PRESSURE (RVSP): 22 MMHG
ECHO RV INTERNAL DIMENSION: 2.6 CM
ECHO TV REGURGITANT MAX VELOCITY: 2.18 M/S
ECHO TV REGURGITANT PEAK GRADIENT: 19 MMHG

## 2023-11-13 PROCEDURE — 93306 TTE W/DOPPLER COMPLETE: CPT | Performed by: INTERNAL MEDICINE

## 2023-11-13 PROCEDURE — 93306 TTE W/DOPPLER COMPLETE: CPT

## 2023-12-30 PROCEDURE — G2066 INTER DEVC REMOTE 30D: HCPCS | Performed by: INTERNAL MEDICINE

## 2023-12-30 PROCEDURE — 93297 REM INTERROG DEV EVAL ICPMS: CPT | Performed by: INTERNAL MEDICINE

## 2023-12-31 PROCEDURE — 93296 REM INTERROG EVL PM/IDS: CPT | Performed by: INTERNAL MEDICINE

## 2023-12-31 PROCEDURE — 93295 DEV INTERROG REMOTE 1/2/MLT: CPT | Performed by: INTERNAL MEDICINE

## 2024-09-17 ENCOUNTER — OFFICE VISIT (OUTPATIENT)
Dept: NON INVASIVE DIAGNOSTICS | Age: 64
End: 2024-09-17
Payer: COMMERCIAL

## 2024-09-17 VITALS
HEART RATE: 63 BPM | HEIGHT: 63 IN | SYSTOLIC BLOOD PRESSURE: 104 MMHG | WEIGHT: 121 LBS | RESPIRATION RATE: 18 BRPM | DIASTOLIC BLOOD PRESSURE: 66 MMHG | BODY MASS INDEX: 21.44 KG/M2

## 2024-09-17 DIAGNOSIS — Z95.810 HISTORY OF IMPLANTABLE CARDIOVERTER-DEFIBRILLATOR (ICD) PLACEMENT: Primary | ICD-10-CM

## 2024-09-17 DIAGNOSIS — I42.8 NONISCHEMIC CARDIOMYOPATHY (HCC): ICD-10-CM

## 2024-09-17 DIAGNOSIS — Z95.810 CARDIAC RESYNCHRONIZATION THERAPY DEFIBRILLATOR (CRT-D) IN PLACE: ICD-10-CM

## 2024-09-17 PROCEDURE — 99215 OFFICE O/P EST HI 40 MIN: CPT | Performed by: INTERNAL MEDICINE

## 2024-09-17 PROCEDURE — 3017F COLORECTAL CA SCREEN DOC REV: CPT | Performed by: INTERNAL MEDICINE

## 2024-09-17 PROCEDURE — G8420 CALC BMI NORM PARAMETERS: HCPCS | Performed by: INTERNAL MEDICINE

## 2024-09-17 PROCEDURE — 93284 PRGRMG EVAL IMPLANTABLE DFB: CPT | Performed by: INTERNAL MEDICINE

## 2024-09-17 PROCEDURE — 1036F TOBACCO NON-USER: CPT | Performed by: INTERNAL MEDICINE

## 2024-09-17 PROCEDURE — 93290 INTERROG DEV EVAL ICPMS IP: CPT | Performed by: INTERNAL MEDICINE

## 2024-09-17 PROCEDURE — G8427 DOCREV CUR MEDS BY ELIG CLIN: HCPCS | Performed by: INTERNAL MEDICINE

## 2024-09-28 PROCEDURE — 93297 REM INTERROG DEV EVAL ICPMS: CPT | Performed by: INTERNAL MEDICINE

## 2024-09-29 PROCEDURE — 93296 REM INTERROG EVL PM/IDS: CPT | Performed by: INTERNAL MEDICINE

## 2024-09-29 PROCEDURE — 93295 DEV INTERROG REMOTE 1/2/MLT: CPT | Performed by: INTERNAL MEDICINE

## 2024-12-28 PROCEDURE — 93297 REM INTERROG DEV EVAL ICPMS: CPT | Performed by: INTERNAL MEDICINE

## 2024-12-29 PROCEDURE — 93295 DEV INTERROG REMOTE 1/2/MLT: CPT | Performed by: INTERNAL MEDICINE

## 2024-12-29 PROCEDURE — 93296 REM INTERROG EVL PM/IDS: CPT | Performed by: INTERNAL MEDICINE

## 2025-01-10 DIAGNOSIS — M81.0 POSTMENOPAUSAL OSTEOPOROSIS: Primary | ICD-10-CM

## 2025-01-10 RX ORDER — ZOLEDRONIC ACID 0.05 MG/ML
5 INJECTION, SOLUTION INTRAVENOUS ONCE
OUTPATIENT
Start: 2025-01-10 | End: 2025-01-10

## 2025-01-10 RX ORDER — SODIUM CHLORIDE 0.9 % (FLUSH) 0.9 %
5-40 SYRINGE (ML) INJECTION PRN
OUTPATIENT
Start: 2025-01-10

## 2025-01-10 RX ORDER — SODIUM CHLORIDE 9 MG/ML
5-250 INJECTION, SOLUTION INTRAVENOUS PRN
OUTPATIENT
Start: 2025-01-10

## 2025-01-13 ENCOUNTER — HOSPITAL ENCOUNTER (OUTPATIENT)
Age: 65
Discharge: HOME | End: 2025-01-13
Payer: COMMERCIAL

## 2025-01-13 LAB
ALBUMIN SERPL-MCNC: 4.7 G/DL (ref 3.5–5.2)
ALP SERPL-CCNC: 73 U/L (ref 35–104)
ALT SERPL-CCNC: 14 U/L (ref 0–32)
ANION GAP SERPL CALCULATED.3IONS-SCNC: 13 MMOL/L (ref 7–16)
AST SERPL-CCNC: 18 U/L (ref 0–31)
BASOPHILS # BLD: 0.03 K/UL (ref 0–0.2)
BASOPHILS NFR BLD: 1 % (ref 0–2)
BILIRUB SERPL-MCNC: 0.6 MG/DL (ref 0–1.2)
BUN SERPL-MCNC: 17 MG/DL (ref 6–23)
CALCIUM SERPL-MCNC: 9.9 MG/DL (ref 8.6–10.2)
CHLORIDE SERPL-SCNC: 102 MMOL/L (ref 98–107)
CHOLEST SERPL-MCNC: 247 MG/DL
CO2 SERPL-SCNC: 26 MMOL/L (ref 22–29)
CREAT SERPL-MCNC: 0.8 MG/DL (ref 0.5–1)
EOSINOPHIL # BLD: 0.07 K/UL (ref 0.05–0.5)
EOSINOPHILS RELATIVE PERCENT: 1 % (ref 0–6)
ERYTHROCYTE [DISTWIDTH] IN BLOOD BY AUTOMATED COUNT: 14 % (ref 11.5–15)
GFR, ESTIMATED: 81 ML/MIN/1.73M2
GLUCOSE SERPL-MCNC: 93 MG/DL (ref 74–99)
HBA1C MFR BLD: 5.6 % (ref 4–5.6)
HCT VFR BLD AUTO: 39.9 % (ref 34–48)
HDLC SERPL-MCNC: 85 MG/DL
HGB BLD-MCNC: 13.6 G/DL (ref 11.5–15.5)
IMM GRANULOCYTES # BLD AUTO: <0.03 K/UL (ref 0–0.58)
IMM GRANULOCYTES NFR BLD: 0 % (ref 0–5)
LDLC SERPL CALC-MCNC: 149 MG/DL
LYMPHOCYTES NFR BLD: 1.22 K/UL (ref 1.5–4)
LYMPHOCYTES RELATIVE PERCENT: 20 % (ref 20–42)
MCH RBC QN AUTO: 27.9 PG (ref 26–35)
MCHC RBC AUTO-ENTMCNC: 34.1 G/DL (ref 32–34.5)
MCV RBC AUTO: 81.8 FL (ref 80–99.9)
MONOCYTES NFR BLD: 0.41 K/UL (ref 0.1–0.95)
MONOCYTES NFR BLD: 7 % (ref 2–12)
NEUTROPHILS NFR BLD: 72 % (ref 43–80)
NEUTS SEG NFR BLD: 4.51 K/UL (ref 1.8–7.3)
PLATELET # BLD AUTO: 230 K/UL (ref 130–450)
PMV BLD AUTO: 10.7 FL (ref 7–12)
POTASSIUM SERPL-SCNC: 3.7 MMOL/L (ref 3.5–5)
PROT SERPL-MCNC: 7.7 G/DL (ref 6.4–8.3)
RBC # BLD AUTO: 4.88 M/UL (ref 3.5–5.5)
SODIUM SERPL-SCNC: 141 MMOL/L (ref 132–146)
TRIGL SERPL-MCNC: 66 MG/DL
VLDLC SERPL CALC-MCNC: 13 MG/DL
WBC OTHER # BLD: 6.3 K/UL (ref 4.5–11.5)

## 2025-01-13 PROCEDURE — 85025 COMPLETE CBC W/AUTO DIFF WBC: CPT

## 2025-01-13 PROCEDURE — 83036 HEMOGLOBIN GLYCOSYLATED A1C: CPT

## 2025-01-13 PROCEDURE — 80053 COMPREHEN METABOLIC PANEL: CPT

## 2025-01-13 PROCEDURE — 80061 LIPID PANEL: CPT

## 2025-01-13 PROCEDURE — 36415 COLL VENOUS BLD VENIPUNCTURE: CPT

## 2025-01-27 ENCOUNTER — HOSPITAL ENCOUNTER (OUTPATIENT)
Dept: INFUSION THERAPY | Age: 65
Setting detail: INFUSION SERIES
Discharge: HOME | End: 2025-01-27
Payer: COMMERCIAL

## 2025-01-27 VITALS
SYSTOLIC BLOOD PRESSURE: 108 MMHG | OXYGEN SATURATION: 100 % | HEART RATE: 60 BPM | RESPIRATION RATE: 18 BRPM | TEMPERATURE: 97.2 F | DIASTOLIC BLOOD PRESSURE: 76 MMHG

## 2025-01-27 DIAGNOSIS — M81.0 POSTMENOPAUSAL OSTEOPOROSIS: Primary | ICD-10-CM

## 2025-01-27 PROCEDURE — 96374 THER/PROPH/DIAG INJ IV PUSH: CPT

## 2025-01-27 PROCEDURE — 2500000003 HC RX 250 WO HCPCS: Performed by: SPECIALIST

## 2025-01-27 PROCEDURE — 6360000002 HC RX W HCPCS: Performed by: SPECIALIST

## 2025-01-27 PROCEDURE — 96365 THER/PROPH/DIAG IV INF INIT: CPT

## 2025-01-27 RX ORDER — SODIUM CHLORIDE 0.9 % (FLUSH) 0.9 %
5-40 SYRINGE (ML) INJECTION PRN
Status: DISCONTINUED | OUTPATIENT
Start: 2025-01-27 | End: 2025-01-28 | Stop reason: HOSPADM

## 2025-01-27 RX ORDER — ZOLEDRONIC ACID 0.05 MG/ML
5 INJECTION, SOLUTION INTRAVENOUS ONCE
OUTPATIENT
Start: 2026-01-26 | End: 2026-01-26

## 2025-01-27 RX ORDER — SODIUM CHLORIDE 0.9 % (FLUSH) 0.9 %
5-40 SYRINGE (ML) INJECTION PRN
OUTPATIENT
Start: 2026-01-26

## 2025-01-27 RX ORDER — ZOLEDRONIC ACID 0.05 MG/ML
5 INJECTION, SOLUTION INTRAVENOUS ONCE
Status: COMPLETED | OUTPATIENT
Start: 2025-01-27 | End: 2025-01-27

## 2025-01-27 RX ORDER — SODIUM CHLORIDE 9 MG/ML
5-250 INJECTION, SOLUTION INTRAVENOUS PRN
OUTPATIENT
Start: 2026-01-26

## 2025-01-27 RX ORDER — SODIUM CHLORIDE 9 MG/ML
5-250 INJECTION, SOLUTION INTRAVENOUS PRN
Status: DISCONTINUED | OUTPATIENT
Start: 2025-01-27 | End: 2025-01-28 | Stop reason: HOSPADM

## 2025-01-27 RX ADMIN — SODIUM CHLORIDE, PRESERVATIVE FREE 10 ML: 5 INJECTION INTRAVENOUS at 09:38

## 2025-01-27 RX ADMIN — ZOLEDRONIC ACID 5 MG: 5 INJECTION INTRAVENOUS at 09:43

## 2025-01-27 NOTE — PROGRESS NOTES
Patient tolerated Reclast IV infusion well. Remained on unit for 20 min after treatment. Patient alert and oriented x3. No distress noted. Vital signs stable. Patient denies any new or worsening pain. Educated patient on possible side effects and treatment of medication.     Patient verbalized understanding. Offered patient education and/or discharge material. Patient accepted. Patient denies any needs. All questions answered. D/C in stable condition.

## 2025-05-01 PROCEDURE — 99285 EMERGENCY DEPT VISIT HI MDM: CPT

## 2025-05-02 ENCOUNTER — HOSPITAL ENCOUNTER (INPATIENT)
Age: 65
LOS: 4 days | Discharge: ANOTHER HOSPITAL | DRG: 871 | End: 2025-05-06
Attending: STUDENT IN AN ORGANIZED HEALTH CARE EDUCATION/TRAINING PROGRAM | Admitting: STUDENT IN AN ORGANIZED HEALTH CARE EDUCATION/TRAINING PROGRAM
Payer: COMMERCIAL

## 2025-05-02 ENCOUNTER — APPOINTMENT (OUTPATIENT)
Dept: CT IMAGING | Age: 65
DRG: 871 | End: 2025-05-02
Payer: COMMERCIAL

## 2025-05-02 ENCOUNTER — APPOINTMENT (OUTPATIENT)
Dept: GENERAL RADIOLOGY | Age: 65
DRG: 871 | End: 2025-05-02
Payer: COMMERCIAL

## 2025-05-02 ENCOUNTER — APPOINTMENT (OUTPATIENT)
Age: 65
DRG: 871 | End: 2025-05-02
Attending: INTERNAL MEDICINE
Payer: COMMERCIAL

## 2025-05-02 DIAGNOSIS — R65.21 SEPTIC SHOCK (HCC): ICD-10-CM

## 2025-05-02 DIAGNOSIS — R57.9 SHOCK (HCC): Primary | ICD-10-CM

## 2025-05-02 DIAGNOSIS — E87.6 HYPOKALEMIA: ICD-10-CM

## 2025-05-02 DIAGNOSIS — D69.6 THROMBOCYTOPENIA: ICD-10-CM

## 2025-05-02 DIAGNOSIS — A41.9 SEPTIC SHOCK (HCC): ICD-10-CM

## 2025-05-02 DIAGNOSIS — N17.9 AKI (ACUTE KIDNEY INJURY): ICD-10-CM

## 2025-05-02 PROBLEM — R78.81 BACTEREMIA: Status: ACTIVE | Noted: 2025-05-02

## 2025-05-02 PROBLEM — Z95.810 PRESENCE OF CARDIAC RESYNCHRONIZATION THERAPY DEFIBRILLATOR (CRT-D): Status: ACTIVE | Noted: 2025-05-02

## 2025-05-02 LAB
ALBUMIN SERPL-MCNC: 2.8 G/DL (ref 3.5–5.2)
ALP SERPL-CCNC: 74 U/L (ref 35–104)
ALT SERPL-CCNC: 26 U/L (ref 0–35)
ANION GAP SERPL CALCULATED.3IONS-SCNC: 15 MMOL/L (ref 7–16)
ANION GAP SERPL CALCULATED.3IONS-SCNC: 16 MMOL/L (ref 7–16)
AST SERPL-CCNC: 43 U/L (ref 0–35)
B PARAP IS1001 DNA NPH QL NAA+NON-PROBE: NOT DETECTED
B PERT DNA SPEC QL NAA+PROBE: NOT DETECTED
BACTERIA URNS QL MICRO: ABNORMAL
BASOPHILS # BLD: 0.03 K/UL (ref 0–0.2)
BASOPHILS NFR BLD: 1 % (ref 0–2)
BILIRUB SERPL-MCNC: 1.3 MG/DL (ref 0–1.2)
BILIRUB UR QL STRIP: NEGATIVE
BUN SERPL-MCNC: 46 MG/DL (ref 8–23)
BUN SERPL-MCNC: 46 MG/DL (ref 8–23)
C PNEUM DNA NPH QL NAA+NON-PROBE: NOT DETECTED
CA-I BLD-SCNC: 0.94 MMOL/L (ref 1.15–1.33)
CALCIUM SERPL-MCNC: 6.6 MG/DL (ref 8.8–10.2)
CALCIUM SERPL-MCNC: 7.3 MG/DL (ref 8.8–10.2)
CHLORIDE SERPL-SCNC: 101 MMOL/L (ref 98–107)
CHLORIDE SERPL-SCNC: 102 MMOL/L (ref 98–107)
CHP ED QC CHECK: NORMAL
CK SERPL-CCNC: 405 U/L (ref 0–170)
CLARITY UR: ABNORMAL
CO2 SERPL-SCNC: 15 MMOL/L (ref 22–29)
CO2 SERPL-SCNC: 15 MMOL/L (ref 22–29)
COLOR UR: YELLOW
CORTIS SERPL-MCNC: 59.2 UG/DL (ref 2.7–18.4)
CORTISOL COLLECTION INFO: ABNORMAL
CREAT SERPL-MCNC: 2.3 MG/DL (ref 0.5–1)
CREAT SERPL-MCNC: 2.4 MG/DL (ref 0.5–1)
CRP SERPL HS-MCNC: 291 MG/L (ref 0–5)
ECHO AO ASC DIAM: 2.6 CM
ECHO AO ASCENDING AORTA INDEX: 1.68 CM/M2
ECHO AV AREA PEAK VELOCITY: 1.9 CM2
ECHO AV AREA VTI: 1.8 CM2
ECHO AV AREA/BSA PEAK VELOCITY: 1.2 CM2/M2
ECHO AV AREA/BSA VTI: 1.2 CM2/M2
ECHO AV CUSP MM: 1.6 CM
ECHO AV MEAN GRADIENT: 4 MMHG
ECHO AV MEAN VELOCITY: 1 M/S
ECHO AV PEAK GRADIENT: 7 MMHG
ECHO AV PEAK VELOCITY: 1.3 M/S
ECHO AV VELOCITY RATIO: 0.77
ECHO AV VTI: 21.6 CM
ECHO BSA: 1.55 M2
ECHO EST RA PRESSURE: 3 MMHG
ECHO LA DIAMETER INDEX: 1.68 CM/M2
ECHO LA DIAMETER: 2.6 CM
ECHO LA VOL A-L A2C: 24 ML (ref 22–52)
ECHO LA VOL A-L A4C: 24 ML (ref 22–52)
ECHO LA VOL MOD A2C: 22 ML (ref 22–52)
ECHO LA VOL MOD A4C: 23 ML (ref 22–52)
ECHO LA VOLUME AREA LENGTH: 27 ML
ECHO LA VOLUME INDEX A-L A2C: 15 ML/M2 (ref 16–34)
ECHO LA VOLUME INDEX A-L A4C: 15 ML/M2 (ref 16–34)
ECHO LA VOLUME INDEX AREA LENGTH: 17 ML/M2 (ref 16–34)
ECHO LA VOLUME INDEX MOD A2C: 14 ML/M2 (ref 16–34)
ECHO LA VOLUME INDEX MOD A4C: 15 ML/M2 (ref 16–34)
ECHO LV EDV A2C: 72 ML
ECHO LV EDV A4C: 53 ML
ECHO LV EDV BP: 61 ML (ref 56–104)
ECHO LV EDV INDEX A4C: 34 ML/M2
ECHO LV EDV INDEX BP: 39 ML/M2
ECHO LV EDV NDEX A2C: 46 ML/M2
ECHO LV EF PHYSICIAN: 55 %
ECHO LV EJECTION FRACTION A2C: 55 %
ECHO LV EJECTION FRACTION A4C: 63 %
ECHO LV EJECTION FRACTION BIPLANE: 56 % (ref 55–100)
ECHO LV ESV A2C: 33 ML
ECHO LV ESV A4C: 19 ML
ECHO LV ESV BP: 26 ML (ref 19–49)
ECHO LV ESV INDEX A2C: 21 ML/M2
ECHO LV ESV INDEX A4C: 12 ML/M2
ECHO LV ESV INDEX BP: 17 ML/M2
ECHO LV FRACTIONAL SHORTENING: 26 % (ref 28–44)
ECHO LV INTERNAL DIMENSION DIASTOLE INDEX: 2.52 CM/M2
ECHO LV INTERNAL DIMENSION DIASTOLIC: 3.9 CM (ref 3.9–5.3)
ECHO LV INTERNAL DIMENSION SYSTOLIC INDEX: 1.87 CM/M2
ECHO LV INTERNAL DIMENSION SYSTOLIC: 2.9 CM
ECHO LV ISOVOLUMETRIC RELAXATION TIME (IVRT): 103.8 MS
ECHO LV IVSD: 0.9 CM (ref 0.6–0.9)
ECHO LV IVSS: 1.1 CM
ECHO LV MASS 2D: 97.4 G (ref 67–162)
ECHO LV MASS INDEX 2D: 62.8 G/M2 (ref 43–95)
ECHO LV POSTERIOR WALL DIASTOLIC: 0.8 CM (ref 0.6–0.9)
ECHO LV POSTERIOR WALL SYSTOLIC: 1.4 CM
ECHO LV RELATIVE WALL THICKNESS RATIO: 0.41
ECHO LVOT AREA: 2.5 CM2
ECHO LVOT AV VTI INDEX: 0.73
ECHO LVOT DIAM: 1.8 CM
ECHO LVOT MEAN GRADIENT: 2 MMHG
ECHO LVOT PEAK GRADIENT: 4 MMHG
ECHO LVOT PEAK VELOCITY: 1 M/S
ECHO LVOT STROKE VOLUME INDEX: 25.8 ML/M2
ECHO LVOT SV: 39.9 ML
ECHO LVOT VTI: 15.7 CM
ECHO MV "A" WAVE DURATION: 108.4 MSEC
ECHO MV A VELOCITY: 0.75 M/S
ECHO MV AREA PHT: 7.1 CM2
ECHO MV AREA VTI: 2.7 CM2
ECHO MV E DECELERATION TIME (DT): 107.6 MS
ECHO MV E VELOCITY: 0.46 M/S
ECHO MV E/A RATIO: 0.61
ECHO MV LVOT VTI INDEX: 0.94
ECHO MV MAX VELOCITY: 1 M/S
ECHO MV MEAN GRADIENT: 2 MMHG
ECHO MV MEAN VELOCITY: 0.8 M/S
ECHO MV PEAK GRADIENT: 4 MMHG
ECHO MV PRESSURE HALF TIME (PHT): 30.9 MS
ECHO MV VTI: 14.8 CM
ECHO PV MAX VELOCITY: 0.9 M/S
ECHO PV MEAN GRADIENT: 2 MMHG
ECHO PV MEAN VELOCITY: 0.7 M/S
ECHO PV PEAK GRADIENT: 3 MMHG
ECHO PV VTI: 18.5 CM
ECHO RIGHT VENTRICULAR SYSTOLIC PRESSURE (RVSP): 28 MMHG
ECHO RV INTERNAL DIMENSION: 2.5 CM
ECHO RV TAPSE: 2.6 CM (ref 1.7–?)
ECHO TV REGURGITANT MAX VELOCITY: 2.48 M/S
ECHO TV REGURGITANT PEAK GRADIENT: 25 MMHG
EKG ATRIAL RATE: 120 BPM
EKG P AXIS: 72 DEGREES
EKG P-R INTERVAL: 138 MS
EKG Q-T INTERVAL: 352 MS
EKG QRS DURATION: 110 MS
EKG QTC CALCULATION (BAZETT): 497 MS
EKG R AXIS: 129 DEGREES
EKG T AXIS: -3 DEGREES
EKG VENTRICULAR RATE: 120 BPM
EOSINOPHIL # BLD: 0 K/UL (ref 0.05–0.5)
EOSINOPHILS RELATIVE PERCENT: 0 % (ref 0–6)
ERYTHROCYTE [DISTWIDTH] IN BLOOD BY AUTOMATED COUNT: 14.6 % (ref 11.5–15)
ERYTHROCYTE [SEDIMENTATION RATE] IN BLOOD BY WESTERGREN METHOD: 15 MM/HR (ref 0–20)
FLUAV RNA NPH QL NAA+NON-PROBE: NOT DETECTED
FLUBV RNA NPH QL NAA+NON-PROBE: NOT DETECTED
GFR, ESTIMATED: 23 ML/MIN/1.73M2
GFR, ESTIMATED: 24 ML/MIN/1.73M2
GLUCOSE BLD-MCNC: 205 MG/DL
GLUCOSE BLD-MCNC: 205 MG/DL (ref 74–99)
GLUCOSE SERPL-MCNC: 139 MG/DL (ref 74–99)
GLUCOSE SERPL-MCNC: 177 MG/DL (ref 74–99)
GLUCOSE UR STRIP-MCNC: 250 MG/DL
HADV DNA NPH QL NAA+NON-PROBE: NOT DETECTED
HCOV 229E RNA NPH QL NAA+NON-PROBE: NOT DETECTED
HCOV HKU1 RNA NPH QL NAA+NON-PROBE: NOT DETECTED
HCOV NL63 RNA NPH QL NAA+NON-PROBE: NOT DETECTED
HCOV OC43 RNA NPH QL NAA+NON-PROBE: NOT DETECTED
HCT VFR BLD AUTO: 45.4 % (ref 34–48)
HGB BLD-MCNC: 15.8 G/DL (ref 11.5–15.5)
HGB UR QL STRIP.AUTO: ABNORMAL
HMPV RNA NPH QL NAA+NON-PROBE: NOT DETECTED
HPIV1 RNA NPH QL NAA+NON-PROBE: NOT DETECTED
HPIV2 RNA NPH QL NAA+NON-PROBE: NOT DETECTED
HPIV3 RNA NPH QL NAA+NON-PROBE: NOT DETECTED
HPIV4 RNA NPH QL NAA+NON-PROBE: NOT DETECTED
IMM GRANULOCYTES # BLD AUTO: 0.07 K/UL (ref 0–0.58)
IMM GRANULOCYTES NFR BLD: 1 % (ref 0–5)
KETONES UR STRIP-MCNC: NEGATIVE MG/DL
LACTATE BLDV-SCNC: 2.2 MMOL/L (ref 0.5–2.2)
LACTATE BLDV-SCNC: 2.7 MMOL/L (ref 0.5–1.9)
LACTATE BLDV-SCNC: 4 MMOL/L (ref 0.5–1.9)
LEUKOCYTE ESTERASE UR QL STRIP: ABNORMAL
LYMPHOCYTES NFR BLD: 0.15 K/UL (ref 1.5–4)
LYMPHOCYTES RELATIVE PERCENT: 3 % (ref 20–42)
M PNEUMO DNA NPH QL NAA+NON-PROBE: NOT DETECTED
MAGNESIUM SERPL-MCNC: 1.7 MG/DL (ref 1.6–2.4)
MAGNESIUM SERPL-MCNC: 1.9 MG/DL (ref 1.6–2.4)
MCH RBC QN AUTO: 27.9 PG (ref 26–35)
MCHC RBC AUTO-ENTMCNC: 34.8 G/DL (ref 32–34.5)
MCV RBC AUTO: 80.2 FL (ref 80–99.9)
MONOCYTES NFR BLD: 0.08 K/UL (ref 0.1–0.95)
MONOCYTES NFR BLD: 1 % (ref 2–12)
NEUTROPHILS NFR BLD: 94 % (ref 43–80)
NEUTS SEG NFR BLD: 5.27 K/UL (ref 1.8–7.3)
NITRITE UR QL STRIP: NEGATIVE
PH UR STRIP: 6 [PH] (ref 5–8)
PLATELET CONFIRMATION: NORMAL
PLATELET, FLUORESCENCE: 46 K/UL (ref 130–450)
PMV BLD AUTO: ABNORMAL FL (ref 7–12)
POTASSIUM SERPL-SCNC: 3.1 MMOL/L (ref 3.5–5.1)
POTASSIUM SERPL-SCNC: 3.2 MMOL/L (ref 3.5–5.1)
PROCALCITONIN SERPL-MCNC: 31.8 NG/ML (ref 0–0.08)
PROCALCITONIN SERPL-MCNC: 60.8 NG/ML (ref 0–0.08)
PROT SERPL-MCNC: 5.9 G/DL (ref 6.4–8.3)
PROT UR STRIP-MCNC: 30 MG/DL
RBC # BLD AUTO: 5.66 M/UL (ref 3.5–5.5)
RBC # BLD: ABNORMAL 10*6/UL
RBC #/AREA URNS HPF: ABNORMAL /HPF
RSV RNA NPH QL NAA+NON-PROBE: NOT DETECTED
RV+EV RNA NPH QL NAA+NON-PROBE: NOT DETECTED
SARS-COV-2 RNA NPH QL NAA+NON-PROBE: NOT DETECTED
SODIUM SERPL-SCNC: 131 MMOL/L (ref 136–145)
SODIUM SERPL-SCNC: 131 MMOL/L (ref 136–145)
SP GR UR STRIP: 1.01 (ref 1–1.03)
SPECIMEN DESCRIPTION: NORMAL
TROPONIN I SERPL HS-MCNC: 26 NG/L (ref 0–14)
TROPONIN I SERPL HS-MCNC: 27 NG/L (ref 0–14)
UROBILINOGEN UR STRIP-ACNC: 0.2 EU/DL (ref 0–1)
WBC # BLD: ABNORMAL 10*3/UL
WBC # BLD: ABNORMAL 10*3/UL
WBC #/AREA URNS HPF: ABNORMAL /HPF
WBC OTHER # BLD: 5.6 K/UL (ref 4.5–11.5)

## 2025-05-02 PROCEDURE — 82962 GLUCOSE BLOOD TEST: CPT

## 2025-05-02 PROCEDURE — 6370000000 HC RX 637 (ALT 250 FOR IP): Performed by: INTERNAL MEDICINE

## 2025-05-02 PROCEDURE — 87040 BLOOD CULTURE FOR BACTERIA: CPT

## 2025-05-02 PROCEDURE — 85652 RBC SED RATE AUTOMATED: CPT

## 2025-05-02 PROCEDURE — 6360000002 HC RX W HCPCS: Performed by: STUDENT IN AN ORGANIZED HEALTH CARE EDUCATION/TRAINING PROGRAM

## 2025-05-02 PROCEDURE — 99223 1ST HOSP IP/OBS HIGH 75: CPT | Performed by: STUDENT IN AN ORGANIZED HEALTH CARE EDUCATION/TRAINING PROGRAM

## 2025-05-02 PROCEDURE — 93306 TTE W/DOPPLER COMPLETE: CPT | Performed by: INTERNAL MEDICINE

## 2025-05-02 PROCEDURE — 96366 THER/PROPH/DIAG IV INF ADDON: CPT

## 2025-05-02 PROCEDURE — 82533 TOTAL CORTISOL: CPT

## 2025-05-02 PROCEDURE — 82550 ASSAY OF CK (CPK): CPT

## 2025-05-02 PROCEDURE — 87150 DNA/RNA AMPLIFIED PROBE: CPT

## 2025-05-02 PROCEDURE — 86140 C-REACTIVE PROTEIN: CPT

## 2025-05-02 PROCEDURE — 99291 CRITICAL CARE FIRST HOUR: CPT | Performed by: INTERNAL MEDICINE

## 2025-05-02 PROCEDURE — P9047 ALBUMIN (HUMAN), 25%, 50ML: HCPCS | Performed by: INTERNAL MEDICINE

## 2025-05-02 PROCEDURE — 2500000003 HC RX 250 WO HCPCS: Performed by: STUDENT IN AN ORGANIZED HEALTH CARE EDUCATION/TRAINING PROGRAM

## 2025-05-02 PROCEDURE — 84145 PROCALCITONIN (PCT): CPT

## 2025-05-02 PROCEDURE — 80048 BASIC METABOLIC PNL TOTAL CA: CPT

## 2025-05-02 PROCEDURE — 85025 COMPLETE CBC W/AUTO DIFF WBC: CPT

## 2025-05-02 PROCEDURE — 83735 ASSAY OF MAGNESIUM: CPT

## 2025-05-02 PROCEDURE — 6360000002 HC RX W HCPCS: Performed by: INTERNAL MEDICINE

## 2025-05-02 PROCEDURE — 93010 ELECTROCARDIOGRAM REPORT: CPT | Performed by: INTERNAL MEDICINE

## 2025-05-02 PROCEDURE — 2580000003 HC RX 258: Performed by: INTERNAL MEDICINE

## 2025-05-02 PROCEDURE — 6370000000 HC RX 637 (ALT 250 FOR IP): Performed by: STUDENT IN AN ORGANIZED HEALTH CARE EDUCATION/TRAINING PROGRAM

## 2025-05-02 PROCEDURE — 0202U NFCT DS 22 TRGT SARS-COV-2: CPT

## 2025-05-02 PROCEDURE — 71045 X-RAY EXAM CHEST 1 VIEW: CPT

## 2025-05-02 PROCEDURE — 96365 THER/PROPH/DIAG IV INF INIT: CPT

## 2025-05-02 PROCEDURE — 74176 CT ABD & PELVIS W/O CONTRAST: CPT

## 2025-05-02 PROCEDURE — 87086 URINE CULTURE/COLONY COUNT: CPT

## 2025-05-02 PROCEDURE — 93005 ELECTROCARDIOGRAM TRACING: CPT | Performed by: STUDENT IN AN ORGANIZED HEALTH CARE EDUCATION/TRAINING PROGRAM

## 2025-05-02 PROCEDURE — 82330 ASSAY OF CALCIUM: CPT

## 2025-05-02 PROCEDURE — 96361 HYDRATE IV INFUSION ADD-ON: CPT

## 2025-05-02 PROCEDURE — 93306 TTE W/DOPPLER COMPLETE: CPT

## 2025-05-02 PROCEDURE — 81001 URINALYSIS AUTO W/SCOPE: CPT

## 2025-05-02 PROCEDURE — 96368 THER/DIAG CONCURRENT INF: CPT

## 2025-05-02 PROCEDURE — 86403 PARTICLE AGGLUT ANTBDY SCRN: CPT

## 2025-05-02 PROCEDURE — 2000000000 HC ICU R&B

## 2025-05-02 PROCEDURE — 84100 ASSAY OF PHOSPHORUS: CPT

## 2025-05-02 PROCEDURE — 83605 ASSAY OF LACTIC ACID: CPT

## 2025-05-02 PROCEDURE — 84484 ASSAY OF TROPONIN QUANT: CPT

## 2025-05-02 PROCEDURE — 2580000003 HC RX 258: Performed by: STUDENT IN AN ORGANIZED HEALTH CARE EDUCATION/TRAINING PROGRAM

## 2025-05-02 PROCEDURE — 51798 US URINE CAPACITY MEASURE: CPT

## 2025-05-02 PROCEDURE — 80053 COMPREHEN METABOLIC PANEL: CPT

## 2025-05-02 PROCEDURE — 96375 TX/PRO/DX INJ NEW DRUG ADDON: CPT

## 2025-05-02 PROCEDURE — 96376 TX/PRO/DX INJ SAME DRUG ADON: CPT

## 2025-05-02 RX ORDER — BISACODYL 10 MG
10 SUPPOSITORY, RECTAL RECTAL DAILY PRN
Status: DISCONTINUED | OUTPATIENT
Start: 2025-05-02 | End: 2025-05-06 | Stop reason: HOSPADM

## 2025-05-02 RX ORDER — SODIUM CHLORIDE, SODIUM LACTATE, POTASSIUM CHLORIDE, AND CALCIUM CHLORIDE .6; .31; .03; .02 G/100ML; G/100ML; G/100ML; G/100ML
1000 INJECTION, SOLUTION INTRAVENOUS ONCE
Status: COMPLETED | OUTPATIENT
Start: 2025-05-02 | End: 2025-05-02

## 2025-05-02 RX ORDER — INDOMETHACIN 25 MG/1
50 CAPSULE ORAL ONCE
Status: DISCONTINUED | OUTPATIENT
Start: 2025-05-02 | End: 2025-05-02

## 2025-05-02 RX ORDER — FENTANYL CITRATE 50 UG/ML
50 INJECTION, SOLUTION INTRAMUSCULAR; INTRAVENOUS ONCE
Status: COMPLETED | OUTPATIENT
Start: 2025-05-02 | End: 2025-05-02

## 2025-05-02 RX ORDER — MAGNESIUM HYDROXIDE/ALUMINUM HYDROXICE/SIMETHICONE 120; 1200; 1200 MG/30ML; MG/30ML; MG/30ML
30 SUSPENSION ORAL EVERY 6 HOURS PRN
Status: DISCONTINUED | OUTPATIENT
Start: 2025-05-02 | End: 2025-05-06 | Stop reason: HOSPADM

## 2025-05-02 RX ORDER — ONDANSETRON 2 MG/ML
4 INJECTION INTRAMUSCULAR; INTRAVENOUS EVERY 6 HOURS PRN
Status: DISCONTINUED | OUTPATIENT
Start: 2025-05-02 | End: 2025-05-06 | Stop reason: HOSPADM

## 2025-05-02 RX ORDER — SODIUM CHLORIDE 9 MG/ML
INJECTION, SOLUTION INTRAVENOUS PRN
Status: DISCONTINUED | OUTPATIENT
Start: 2025-05-02 | End: 2025-05-06 | Stop reason: HOSPADM

## 2025-05-02 RX ORDER — SODIUM CHLORIDE, SODIUM LACTATE, POTASSIUM CHLORIDE, CALCIUM CHLORIDE 600; 310; 30; 20 MG/100ML; MG/100ML; MG/100ML; MG/100ML
INJECTION, SOLUTION INTRAVENOUS CONTINUOUS
Status: DISCONTINUED | OUTPATIENT
Start: 2025-05-02 | End: 2025-05-03

## 2025-05-02 RX ORDER — OXYCODONE AND ACETAMINOPHEN 5; 325 MG/1; MG/1
1 TABLET ORAL EVERY 6 HOURS
Refills: 0 | Status: DISCONTINUED | OUTPATIENT
Start: 2025-05-02 | End: 2025-05-02

## 2025-05-02 RX ORDER — MAGNESIUM SULFATE IN WATER 40 MG/ML
2000 INJECTION, SOLUTION INTRAVENOUS ONCE
Status: COMPLETED | OUTPATIENT
Start: 2025-05-02 | End: 2025-05-02

## 2025-05-02 RX ORDER — 0.9 % SODIUM CHLORIDE 0.9 %
1000 INTRAVENOUS SOLUTION INTRAVENOUS ONCE
Status: COMPLETED | OUTPATIENT
Start: 2025-05-02 | End: 2025-05-02

## 2025-05-02 RX ORDER — ALBUMIN (HUMAN) 12.5 G/50ML
25 SOLUTION INTRAVENOUS EVERY 6 HOURS
Status: COMPLETED | OUTPATIENT
Start: 2025-05-02 | End: 2025-05-03

## 2025-05-02 RX ORDER — HYDROCODONE BITARTRATE AND ACETAMINOPHEN 5; 325 MG/1; MG/1
1 TABLET ORAL EVERY 4 HOURS PRN
Refills: 0 | Status: DISCONTINUED | OUTPATIENT
Start: 2025-05-02 | End: 2025-05-06 | Stop reason: HOSPADM

## 2025-05-02 RX ORDER — ACETAMINOPHEN 325 MG/1
650 TABLET ORAL EVERY 6 HOURS PRN
Status: DISCONTINUED | OUTPATIENT
Start: 2025-05-02 | End: 2025-05-06 | Stop reason: HOSPADM

## 2025-05-02 RX ORDER — ACETAMINOPHEN 650 MG/1
650 SUPPOSITORY RECTAL EVERY 6 HOURS PRN
Status: DISCONTINUED | OUTPATIENT
Start: 2025-05-02 | End: 2025-05-06 | Stop reason: HOSPADM

## 2025-05-02 RX ORDER — POTASSIUM CHLORIDE 7.45 MG/ML
10 INJECTION INTRAVENOUS ONCE
Status: COMPLETED | OUTPATIENT
Start: 2025-05-02 | End: 2025-05-02

## 2025-05-02 RX ORDER — POTASSIUM CHLORIDE 7.45 MG/ML
10 INJECTION INTRAVENOUS ONCE
Status: DISCONTINUED | OUTPATIENT
Start: 2025-05-02 | End: 2025-05-02

## 2025-05-02 RX ORDER — ATORVASTATIN CALCIUM 20 MG/1
20 TABLET, FILM COATED ORAL DAILY
Status: DISCONTINUED | OUTPATIENT
Start: 2025-05-02 | End: 2025-05-06 | Stop reason: HOSPADM

## 2025-05-02 RX ORDER — ENOXAPARIN SODIUM 100 MG/ML
30 INJECTION SUBCUTANEOUS DAILY
Status: DISCONTINUED | OUTPATIENT
Start: 2025-05-02 | End: 2025-05-06 | Stop reason: HOSPADM

## 2025-05-02 RX ORDER — SODIUM CHLORIDE 0.9 % (FLUSH) 0.9 %
5-40 SYRINGE (ML) INJECTION EVERY 12 HOURS SCHEDULED
Status: DISCONTINUED | OUTPATIENT
Start: 2025-05-02 | End: 2025-05-06 | Stop reason: HOSPADM

## 2025-05-02 RX ORDER — SENNA AND DOCUSATE SODIUM 50; 8.6 MG/1; MG/1
1 TABLET, FILM COATED ORAL 2 TIMES DAILY
Status: DISCONTINUED | OUTPATIENT
Start: 2025-05-02 | End: 2025-05-06 | Stop reason: HOSPADM

## 2025-05-02 RX ORDER — SODIUM CHLORIDE 9 MG/ML
INJECTION, SOLUTION INTRAVENOUS CONTINUOUS
Status: DISCONTINUED | OUTPATIENT
Start: 2025-05-02 | End: 2025-05-02

## 2025-05-02 RX ORDER — OXYCODONE HYDROCHLORIDE 5 MG/1
5 TABLET ORAL EVERY 6 HOURS
Refills: 0 | Status: DISCONTINUED | OUTPATIENT
Start: 2025-05-02 | End: 2025-05-06

## 2025-05-02 RX ORDER — ACETAMINOPHEN 500 MG
1000 TABLET ORAL ONCE
Status: COMPLETED | OUTPATIENT
Start: 2025-05-02 | End: 2025-05-02

## 2025-05-02 RX ORDER — CALCIUM GLUCONATE 20 MG/ML
1000 INJECTION, SOLUTION INTRAVENOUS ONCE
Status: DISCONTINUED | OUTPATIENT
Start: 2025-05-02 | End: 2025-05-02

## 2025-05-02 RX ORDER — ONDANSETRON 4 MG/1
4 TABLET, ORALLY DISINTEGRATING ORAL EVERY 8 HOURS PRN
Status: DISCONTINUED | OUTPATIENT
Start: 2025-05-02 | End: 2025-05-06 | Stop reason: HOSPADM

## 2025-05-02 RX ORDER — POLYETHYLENE GLYCOL 3350 17 G/17G
17 POWDER, FOR SOLUTION ORAL DAILY
Status: DISCONTINUED | OUTPATIENT
Start: 2025-05-02 | End: 2025-05-06 | Stop reason: HOSPADM

## 2025-05-02 RX ORDER — SODIUM CHLORIDE 0.9 % (FLUSH) 0.9 %
5-40 SYRINGE (ML) INJECTION PRN
Status: DISCONTINUED | OUTPATIENT
Start: 2025-05-02 | End: 2025-05-06 | Stop reason: HOSPADM

## 2025-05-02 RX ORDER — NOREPINEPHRINE BITARTRATE 0.06 MG/ML
1-100 INJECTION, SOLUTION INTRAVENOUS CONTINUOUS
Status: DISCONTINUED | OUTPATIENT
Start: 2025-05-02 | End: 2025-05-05

## 2025-05-02 RX ADMIN — VANCOMYCIN HYDROCHLORIDE 1000 MG: 1 INJECTION, POWDER, LYOPHILIZED, FOR SOLUTION INTRAVENOUS at 03:56

## 2025-05-02 RX ADMIN — NAFCILLIN SODIUM 2000 MG: 2 INJECTION, POWDER, LYOPHILIZED, FOR SOLUTION INTRAMUSCULAR; INTRAVENOUS at 17:02

## 2025-05-02 RX ADMIN — SENNOSIDES AND DOCUSATE SODIUM 1 TABLET: 8.6; 5 TABLET ORAL at 20:15

## 2025-05-02 RX ADMIN — ALBUMIN (HUMAN) 25 G: 0.25 INJECTION, SOLUTION INTRAVENOUS at 12:09

## 2025-05-02 RX ADMIN — OXYCODONE HYDROCHLORIDE 5 MG: 5 TABLET ORAL at 16:57

## 2025-05-02 RX ADMIN — ATORVASTATIN CALCIUM 20 MG: 20 TABLET, FILM COATED ORAL at 11:01

## 2025-05-02 RX ADMIN — OXYCODONE HYDROCHLORIDE 5 MG: 5 TABLET ORAL at 11:01

## 2025-05-02 RX ADMIN — SODIUM CHLORIDE, SODIUM LACTATE, POTASSIUM CHLORIDE, AND CALCIUM CHLORIDE 1000 ML: .6; .31; .03; .02 INJECTION, SOLUTION INTRAVENOUS at 14:26

## 2025-05-02 RX ADMIN — HYDROCODONE BITARTRATE AND ACETAMINOPHEN 1 TABLET: 5; 325 TABLET ORAL at 13:46

## 2025-05-02 RX ADMIN — SODIUM CHLORIDE 1000 ML: 9 INJECTION, SOLUTION INTRAVENOUS at 01:07

## 2025-05-02 RX ADMIN — SODIUM CHLORIDE, PRESERVATIVE FREE 40 MG: 5 INJECTION INTRAVENOUS at 13:08

## 2025-05-02 RX ADMIN — SODIUM CHLORIDE 1000 ML: 0.9 INJECTION, SOLUTION INTRAVENOUS at 06:26

## 2025-05-02 RX ADMIN — NAFCILLIN SODIUM 2000 MG: 2 INJECTION, POWDER, LYOPHILIZED, FOR SOLUTION INTRAMUSCULAR; INTRAVENOUS at 20:43

## 2025-05-02 RX ADMIN — SODIUM CHLORIDE, SODIUM LACTATE, POTASSIUM CHLORIDE, AND CALCIUM CHLORIDE 1000 ML: .6; .31; .03; .02 INJECTION, SOLUTION INTRAVENOUS at 11:50

## 2025-05-02 RX ADMIN — FENTANYL CITRATE 50 MCG: 50 INJECTION INTRAMUSCULAR; INTRAVENOUS at 08:18

## 2025-05-02 RX ADMIN — HYDROCODONE BITARTRATE AND ACETAMINOPHEN 1 TABLET: 5; 325 TABLET ORAL at 20:29

## 2025-05-02 RX ADMIN — MAGNESIUM SULFATE HEPTAHYDRATE 2000 MG: 40 INJECTION, SOLUTION INTRAVENOUS at 11:41

## 2025-05-02 RX ADMIN — SODIUM CHLORIDE 5 MCG/MIN: 0.9 INJECTION, SOLUTION INTRAVENOUS at 08:52

## 2025-05-02 RX ADMIN — SODIUM CHLORIDE, SODIUM LACTATE, POTASSIUM CHLORIDE, AND CALCIUM CHLORIDE: .6; .31; .03; .02 INJECTION, SOLUTION INTRAVENOUS at 16:36

## 2025-05-02 RX ADMIN — SODIUM CHLORIDE 1000 ML: 0.9 INJECTION, SOLUTION INTRAVENOUS at 02:12

## 2025-05-02 RX ADMIN — NAFCILLIN SODIUM 2000 MG: 2 INJECTION, POWDER, LYOPHILIZED, FOR SOLUTION INTRAMUSCULAR; INTRAVENOUS at 13:52

## 2025-05-02 RX ADMIN — Medication 3 MG: at 20:15

## 2025-05-02 RX ADMIN — FENTANYL CITRATE 50 MCG: 50 INJECTION INTRAMUSCULAR; INTRAVENOUS at 03:22

## 2025-05-02 RX ADMIN — PIPERACILLIN AND TAZOBACTAM 4500 MG: 4; .5 INJECTION, POWDER, FOR SOLUTION INTRAVENOUS at 03:06

## 2025-05-02 RX ADMIN — SODIUM CHLORIDE, PRESERVATIVE FREE 10 ML: 5 INJECTION INTRAVENOUS at 20:16

## 2025-05-02 RX ADMIN — ALBUMIN (HUMAN) 25 G: 0.25 INJECTION, SOLUTION INTRAVENOUS at 22:33

## 2025-05-02 RX ADMIN — OXYCODONE HYDROCHLORIDE 5 MG: 5 TABLET ORAL at 22:24

## 2025-05-02 RX ADMIN — ALBUMIN (HUMAN) 25 G: 0.25 INJECTION, SOLUTION INTRAVENOUS at 17:02

## 2025-05-02 RX ADMIN — POTASSIUM BICARBONATE 40 MEQ: 782 TABLET, EFFERVESCENT ORAL at 08:18

## 2025-05-02 RX ADMIN — ACETAMINOPHEN 1000 MG: 500 TABLET ORAL at 02:05

## 2025-05-02 RX ADMIN — POTASSIUM CHLORIDE 10 MEQ: 7.46 INJECTION, SOLUTION INTRAVENOUS at 03:32

## 2025-05-02 RX ADMIN — FENTANYL CITRATE 50 MCG: 50 INJECTION INTRAMUSCULAR; INTRAVENOUS at 05:49

## 2025-05-02 ASSESSMENT — PAIN - FUNCTIONAL ASSESSMENT
PAIN_FUNCTIONAL_ASSESSMENT: ACTIVITIES ARE NOT PREVENTED
PAIN_FUNCTIONAL_ASSESSMENT: 0-10
PAIN_FUNCTIONAL_ASSESSMENT: PREVENTS OR INTERFERES SOME ACTIVE ACTIVITIES AND ADLS

## 2025-05-02 ASSESSMENT — PAIN DESCRIPTION - LOCATION
LOCATION: GENERALIZED
LOCATION: OTHER (COMMENT)
LOCATION: OTHER (COMMENT)
LOCATION: GENERALIZED
LOCATION: GENERALIZED

## 2025-05-02 ASSESSMENT — PAIN SCALES - GENERAL
PAINLEVEL_OUTOF10: 8
PAINLEVEL_OUTOF10: 9
PAINLEVEL_OUTOF10: 10
PAINLEVEL_OUTOF10: 8
PAINLEVEL_OUTOF10: 9
PAINLEVEL_OUTOF10: 10
PAINLEVEL_OUTOF10: 4
PAINLEVEL_OUTOF10: 8
PAINLEVEL_OUTOF10: 10
PAINLEVEL_OUTOF10: 10

## 2025-05-02 ASSESSMENT — PAIN DESCRIPTION - DESCRIPTORS
DESCRIPTORS: ACHING
DESCRIPTORS: ACHING;STABBING
DESCRIPTORS: ACHING
DESCRIPTORS: ACHING;DISCOMFORT;SORE
DESCRIPTORS: ACHING;STABBING
DESCRIPTORS: ACHING;DISCOMFORT;SORE
DESCRIPTORS: ACHING;DULL;DISCOMFORT
DESCRIPTORS: ACHING
DESCRIPTORS: ACHING;STABBING

## 2025-05-02 ASSESSMENT — PAIN DESCRIPTION - FREQUENCY
FREQUENCY: CONTINUOUS

## 2025-05-02 ASSESSMENT — PAIN DESCRIPTION - ORIENTATION
ORIENTATION: RIGHT;LEFT;MID
ORIENTATION: LEFT;RIGHT;MID
ORIENTATION: RIGHT;LEFT;MID

## 2025-05-02 ASSESSMENT — PAIN DESCRIPTION - ONSET
ONSET: ON-GOING

## 2025-05-02 ASSESSMENT — PAIN DESCRIPTION - PAIN TYPE
TYPE: CHRONIC PAIN
TYPE: CHRONIC PAIN
TYPE: ACUTE PAIN

## 2025-05-02 NOTE — ED PROVIDER NOTES
[facility] [place] EMERGENCY DEPARTMENT  EMERGENCY DEPARTMENT ENCOUNTER        Pt Name: Vani Cheng  MRN: [id number]  Birthdate 1960  Date of evaluation: 5/1/2025  Provider: Emily Pickens MD  PCP: Tawana Guzman MD  Note Started: 12:53 AM EDT 5/2/25    HPI  64 y.o. female presenting for body aches.  Symptoms started Monday.  Patient complains of diffuse bodyaches as well as decreased appetite and fatigue.  She complains of increased thirst.  She denies fever, chest pain, shortness of breath, emesis or dysuria.  She has history of cardiomyopathy with AICD but has not felt any shocks.      --------------------------------------------- PAST HISTORY ---------------------------------------------  Past Medical History:  has a past medical history of CHF (congestive heart failure) ([facility]), HFrEF (heart failure with reduced ejection fraction) ([facility]), LBBB (left bundle branch block), and Nonischemic cardiomyopathy (HCC).    Past Surgical History:  has a past surgical history that includes Cardiac catheterization (01/16/2020); Colonoscopy; and Cardiac defibrillator placement (12/18/2020).    Social History:  reports that she has never smoked. She has never used smokeless tobacco. She reports that she does not currently use alcohol. She reports that she does not use drugs.    Family History: family history includes No Known Problems in her sister, sister, and sister; Stroke in her mother.     The patient’s home medications have been reviewed.    Allergies: Patient has no known allergies.      Review of Systems   Constitutional:  Positive for appetite change and fatigue. Negative for chills and fever.   HENT:  Negative for congestion and sore throat.    Eyes:  Negative for photophobia and visual disturbance.   Respiratory:  Negative for cough and shortness of breath.    Cardiovascular:  Negative for chest pain and leg swelling.   Gastrointestinal:  Negative for abdominal pain, diarrhea and nausea.

## 2025-05-02 NOTE — H&P
History & Physical      PCP: Tawana Guzman MD    Date of Admission: 5/2/2025    Chief Complaint:  had concerns including Fatigue (Patient state she has had increased weakness and tired since Monday. With generalized body aches states that she gets tired with daily activity ).      ASSESSMENT:    Vani Cheng is a 64 y.o. female patient of Tawana Guzman MD with history of nonischemic cardiomyopathy, ICD, left bundle branch block, CHFrEF, GERD presented to [facility] on 5/2/2025 with concern of fatigue and tiredness.  While in the ER patient found to have hypotension with blood pressures remaining in 70s after IV fluid resuscitation.  Patient had lab workup showing ROSELYN, hypokalemia, lactic acidosis.  Patient chest x-ray showed no obvious pneumonia, CT abdomen was unremarkable.  As patient blood pressure remained low patient was admitted for concern of septic shock to the ICU.    Septic shock:  Unclear etiology  UA and urine culture pending  Blood culture pending  Continue vancomycin and Zosyn  On Levophed  ICU consult  Holding antihypertensives    ROSELYN  Hypokalemia  Patient baseline creatinine is 1.8, creatinine to 2.4 on admission likely in the setting of shock  Received IV fluids  Monitor renal function  Avoid nephrotoxins    Thrombocytopenia likely in the setting of acute illness  Monitor closely, platelet count is 46,000.    HFrEF  NICM s/p ICD  Holding home Bumex, Entresto, Aldactone, metoprolol in the setting of shock, resume once able.      Diet: No diet orders on file  Code Status: Full  DVT Prophylaxis: SCD's or Sequential Compression Device  Disposition: []Med/Surg [] Intermediate [x] ICU/CCU  Admit status: [] Observation [x] Inpatient     Dustin Mix MD    History Of Present Illness:    Vani Cheng is a 64 y.o. female patient of Tawana Guzman MD with history of nonischemic cardiomyopathy, ICD, left bundle branch block, CHFrEF, GERD presented to [facility]

## 2025-05-03 ENCOUNTER — APPOINTMENT (OUTPATIENT)
Dept: CT IMAGING | Age: 65
DRG: 871 | End: 2025-05-03
Payer: COMMERCIAL

## 2025-05-03 LAB
ALBUMIN SERPL-MCNC: 3.1 G/DL (ref 3.5–5.2)
ALP SERPL-CCNC: 40 U/L (ref 35–104)
ALT SERPL-CCNC: 36 U/L (ref 0–35)
ANION GAP SERPL CALCULATED.3IONS-SCNC: 14 MMOL/L (ref 7–16)
ANION GAP SERPL CALCULATED.3IONS-SCNC: 17 MMOL/L (ref 7–16)
ANION GAP SERPL CALCULATED.3IONS-SCNC: 18 MMOL/L (ref 7–16)
AST SERPL-CCNC: 65 U/L (ref 0–35)
B-OH-BUTYR SERPL-MCNC: 0.71 MMOL/L (ref 0.02–0.27)
BASOPHILS # BLD: 0 K/UL (ref 0–0.2)
BASOPHILS NFR BLD: 0 % (ref 0–2)
BILIRUB SERPL-MCNC: 2.6 MG/DL (ref 0–1.2)
BUN SERPL-MCNC: 47 MG/DL (ref 8–23)
BUN SERPL-MCNC: 48 MG/DL (ref 8–23)
BUN SERPL-MCNC: 52 MG/DL (ref 8–23)
CALCIUM SERPL-MCNC: 6.8 MG/DL (ref 8.8–10.2)
CALCIUM SERPL-MCNC: 6.9 MG/DL (ref 8.8–10.2)
CALCIUM SERPL-MCNC: 7.3 MG/DL (ref 8.8–10.2)
CHLORIDE SERPL-SCNC: 100 MMOL/L (ref 98–107)
CHLORIDE SERPL-SCNC: 101 MMOL/L (ref 98–107)
CHLORIDE SERPL-SCNC: 103 MMOL/L (ref 98–107)
CHLORIDE UR-SCNC: <20 MMOL/L
CO2 SERPL-SCNC: 13 MMOL/L (ref 22–29)
CO2 SERPL-SCNC: 14 MMOL/L (ref 22–29)
CO2 SERPL-SCNC: 15 MMOL/L (ref 22–29)
CREAT SERPL-MCNC: 2.3 MG/DL (ref 0.5–1)
CREAT UR-MCNC: 32.6 MG/DL (ref 29–226)
CREAT UR-MCNC: 33.4 MG/DL (ref 29–226)
EOSINOPHIL # BLD: 0 K/UL (ref 0.05–0.5)
EOSINOPHILS RELATIVE PERCENT: 0 % (ref 0–6)
ERYTHROCYTE [DISTWIDTH] IN BLOOD BY AUTOMATED COUNT: 15.4 % (ref 11.5–15)
GFR, ESTIMATED: 23 ML/MIN/1.73M2
GFR, ESTIMATED: 23 ML/MIN/1.73M2
GFR, ESTIMATED: 24 ML/MIN/1.73M2
GLUCOSE SERPL-MCNC: 139 MG/DL (ref 74–99)
GLUCOSE SERPL-MCNC: 156 MG/DL (ref 74–99)
GLUCOSE SERPL-MCNC: 172 MG/DL (ref 74–99)
HCT VFR BLD AUTO: 29.5 % (ref 34–48)
HGB BLD-MCNC: 10.3 G/DL (ref 11.5–15.5)
INR PPP: 1.3
LYMPHOCYTES NFR BLD: 0.07 K/UL (ref 1.5–4)
LYMPHOCYTES RELATIVE PERCENT: 1 % (ref 20–42)
MAGNESIUM SERPL-MCNC: 2.1 MG/DL (ref 1.6–2.4)
MAGNESIUM SERPL-MCNC: 2.1 MG/DL (ref 1.6–2.4)
MAGNESIUM SERPL-MCNC: 2.2 MG/DL (ref 1.6–2.4)
MCH RBC QN AUTO: 28.1 PG (ref 26–35)
MCHC RBC AUTO-ENTMCNC: 34.9 G/DL (ref 32–34.5)
MCV RBC AUTO: 80.6 FL (ref 80–99.9)
METAMYELOCYTES ABSOLUTE COUNT: 0.15 K/UL (ref 0–0.12)
METAMYELOCYTES: 2 % (ref 0–1)
MICROORGANISM SPEC CULT: NO GROWTH
MONOCYTES NFR BLD: 0.22 K/UL (ref 0.1–0.95)
MONOCYTES NFR BLD: 3 % (ref 2–12)
MYELOCYTES ABSOLUTE COUNT: 0.07 K/UL
MYELOCYTES: 1 %
NEUTROPHILS NFR BLD: 94 % (ref 43–80)
NEUTS SEG NFR BLD: 7.89 K/UL (ref 1.8–7.3)
OSMOLALITY UR: 235 MOSM/KG (ref 300–900)
PARTIAL THROMBOPLASTIN TIME: 38.1 SEC (ref 24.5–35.1)
PHOSPHATE SERPL-MCNC: 1.3 MG/DL (ref 2.5–4.5)
PHOSPHATE SERPL-MCNC: 2.6 MG/DL (ref 2.5–4.5)
PHOSPHATE SERPL-MCNC: 3 MG/DL (ref 2.5–4.5)
PLATELET CONFIRMATION: NORMAL
PLATELET, FLUORESCENCE: 28 K/UL (ref 130–450)
PMV BLD AUTO: ABNORMAL FL (ref 7–12)
POTASSIUM SERPL-SCNC: 3.4 MMOL/L (ref 3.5–5.1)
POTASSIUM SERPL-SCNC: 3.7 MMOL/L (ref 3.5–5.1)
POTASSIUM SERPL-SCNC: 3.9 MMOL/L (ref 3.5–5.1)
PROT SERPL-MCNC: 5 G/DL (ref 6.4–8.3)
PROTHROMBIN TIME: 14.7 SEC (ref 9.3–12.4)
RBC # BLD AUTO: 3.66 M/UL (ref 3.5–5.5)
RBC # BLD: ABNORMAL 10*6/UL
SERVICE CMNT-IMP: NORMAL
SODIUM SERPL-SCNC: 131 MMOL/L (ref 136–145)
SODIUM SERPL-SCNC: 132 MMOL/L (ref 136–145)
SODIUM SERPL-SCNC: 133 MMOL/L (ref 136–145)
SODIUM UR-SCNC: <20 MMOL/L
SPECIMEN DESCRIPTION: NORMAL
TOTAL PROTEIN, URINE: 51 MG/DL (ref 0–12)
URINE TOTAL PROTEIN CREATININE RATIO: 1.53 (ref 0–0.2)
WBC OTHER # BLD: 8.4 K/UL (ref 4.5–11.5)

## 2025-05-03 PROCEDURE — 84100 ASSAY OF PHOSPHORUS: CPT

## 2025-05-03 PROCEDURE — 6360000002 HC RX W HCPCS: Performed by: INTERNAL MEDICINE

## 2025-05-03 PROCEDURE — 85025 COMPLETE CBC W/AUTO DIFF WBC: CPT

## 2025-05-03 PROCEDURE — P9047 ALBUMIN (HUMAN), 25%, 50ML: HCPCS | Performed by: INTERNAL MEDICINE

## 2025-05-03 PROCEDURE — 2000000000 HC ICU R&B

## 2025-05-03 PROCEDURE — 6370000000 HC RX 637 (ALT 250 FOR IP): Performed by: STUDENT IN AN ORGANIZED HEALTH CARE EDUCATION/TRAINING PROGRAM

## 2025-05-03 PROCEDURE — 2580000003 HC RX 258: Performed by: INTERNAL MEDICINE

## 2025-05-03 PROCEDURE — 99232 SBSQ HOSP IP/OBS MODERATE 35: CPT | Performed by: STUDENT IN AN ORGANIZED HEALTH CARE EDUCATION/TRAINING PROGRAM

## 2025-05-03 PROCEDURE — 85730 THROMBOPLASTIN TIME PARTIAL: CPT

## 2025-05-03 PROCEDURE — 2500000003 HC RX 250 WO HCPCS: Performed by: INTERNAL MEDICINE

## 2025-05-03 PROCEDURE — 80053 COMPREHEN METABOLIC PANEL: CPT

## 2025-05-03 PROCEDURE — 83935 ASSAY OF URINE OSMOLALITY: CPT

## 2025-05-03 PROCEDURE — 84300 ASSAY OF URINE SODIUM: CPT

## 2025-05-03 PROCEDURE — 83735 ASSAY OF MAGNESIUM: CPT

## 2025-05-03 PROCEDURE — 6370000000 HC RX 637 (ALT 250 FOR IP): Performed by: INTERNAL MEDICINE

## 2025-05-03 PROCEDURE — 80048 BASIC METABOLIC PNL TOTAL CA: CPT

## 2025-05-03 PROCEDURE — 36415 COLL VENOUS BLD VENIPUNCTURE: CPT

## 2025-05-03 PROCEDURE — 82010 KETONE BODYS QUAN: CPT

## 2025-05-03 PROCEDURE — 85610 PROTHROMBIN TIME: CPT

## 2025-05-03 PROCEDURE — 82436 ASSAY OF URINE CHLORIDE: CPT

## 2025-05-03 PROCEDURE — 2500000003 HC RX 250 WO HCPCS: Performed by: STUDENT IN AN ORGANIZED HEALTH CARE EDUCATION/TRAINING PROGRAM

## 2025-05-03 PROCEDURE — 87040 BLOOD CULTURE FOR BACTERIA: CPT

## 2025-05-03 PROCEDURE — 70450 CT HEAD/BRAIN W/O DYE: CPT

## 2025-05-03 PROCEDURE — 82570 ASSAY OF URINE CREATININE: CPT

## 2025-05-03 PROCEDURE — 84156 ASSAY OF PROTEIN URINE: CPT

## 2025-05-03 RX ORDER — ALBUMIN (HUMAN) 12.5 G/50ML
25 SOLUTION INTRAVENOUS EVERY 6 HOURS
Status: COMPLETED | OUTPATIENT
Start: 2025-05-03 | End: 2025-05-03

## 2025-05-03 RX ORDER — CALCIUM GLUCONATE 20 MG/ML
2000 INJECTION, SOLUTION INTRAVENOUS ONCE
Status: COMPLETED | OUTPATIENT
Start: 2025-05-03 | End: 2025-05-03

## 2025-05-03 RX ORDER — MIDODRINE HYDROCHLORIDE 10 MG/1
10 TABLET ORAL
Status: DISCONTINUED | OUTPATIENT
Start: 2025-05-03 | End: 2025-05-05

## 2025-05-03 RX ORDER — HYDROCORTISONE SODIUM SUCCINATE 100 MG/2ML
100 INJECTION INTRAMUSCULAR; INTRAVENOUS EVERY 8 HOURS
Status: DISCONTINUED | OUTPATIENT
Start: 2025-05-03 | End: 2025-05-05

## 2025-05-03 RX ADMIN — HYDROCORTISONE SODIUM SUCCINATE 100 MG: 100 INJECTION, POWDER, FOR SOLUTION INTRAMUSCULAR; INTRAVENOUS at 11:50

## 2025-05-03 RX ADMIN — ALBUMIN (HUMAN) 25 G: 0.25 INJECTION, SOLUTION INTRAVENOUS at 04:09

## 2025-05-03 RX ADMIN — MIDODRINE HYDROCHLORIDE 10 MG: 10 TABLET ORAL at 11:50

## 2025-05-03 RX ADMIN — ALBUMIN (HUMAN) 25 G: 0.25 INJECTION, SOLUTION INTRAVENOUS at 12:49

## 2025-05-03 RX ADMIN — NAFCILLIN SODIUM 2000 MG: 2 INJECTION, POWDER, LYOPHILIZED, FOR SOLUTION INTRAMUSCULAR; INTRAVENOUS at 17:26

## 2025-05-03 RX ADMIN — NAFCILLIN SODIUM 2000 MG: 2 INJECTION, POWDER, LYOPHILIZED, FOR SOLUTION INTRAMUSCULAR; INTRAVENOUS at 20:34

## 2025-05-03 RX ADMIN — OXYCODONE HYDROCHLORIDE 5 MG: 5 TABLET ORAL at 11:50

## 2025-05-03 RX ADMIN — SENNOSIDES AND DOCUSATE SODIUM 1 TABLET: 8.6; 5 TABLET ORAL at 20:32

## 2025-05-03 RX ADMIN — HYDROCODONE BITARTRATE AND ACETAMINOPHEN 1 TABLET: 5; 325 TABLET ORAL at 01:07

## 2025-05-03 RX ADMIN — NAFCILLIN SODIUM 2000 MG: 2 INJECTION, POWDER, LYOPHILIZED, FOR SOLUTION INTRAMUSCULAR; INTRAVENOUS at 00:56

## 2025-05-03 RX ADMIN — HYDROCORTISONE SODIUM SUCCINATE 100 MG: 100 INJECTION, POWDER, FOR SOLUTION INTRAMUSCULAR; INTRAVENOUS at 17:23

## 2025-05-03 RX ADMIN — SODIUM CHLORIDE, SODIUM LACTATE, POTASSIUM CHLORIDE, AND CALCIUM CHLORIDE: .6; .31; .03; .02 INJECTION, SOLUTION INTRAVENOUS at 01:01

## 2025-05-03 RX ADMIN — OXYCODONE HYDROCHLORIDE 5 MG: 5 TABLET ORAL at 04:23

## 2025-05-03 RX ADMIN — OXYCODONE HYDROCHLORIDE 5 MG: 5 TABLET ORAL at 17:23

## 2025-05-03 RX ADMIN — CALCIUM GLUCONATE 2000 MG: 20 INJECTION, SOLUTION INTRAVENOUS at 00:58

## 2025-05-03 RX ADMIN — ATORVASTATIN CALCIUM 20 MG: 20 TABLET, FILM COATED ORAL at 11:50

## 2025-05-03 RX ADMIN — OXYCODONE HYDROCHLORIDE 5 MG: 5 TABLET ORAL at 22:39

## 2025-05-03 RX ADMIN — POTASSIUM PHOSPHATE, MONOBASIC AND POTASSIUM PHOSPHATE, DIBASIC 30 MMOL: 224; 236 INJECTION, SOLUTION, CONCENTRATE INTRAVENOUS at 02:25

## 2025-05-03 RX ADMIN — SODIUM CHLORIDE, PRESERVATIVE FREE 40 MG: 5 INJECTION INTRAVENOUS at 13:00

## 2025-05-03 RX ADMIN — SODIUM BICARBONATE: 84 INJECTION, SOLUTION INTRAVENOUS at 14:00

## 2025-05-03 RX ADMIN — Medication 3 MG: at 20:31

## 2025-05-03 RX ADMIN — NAFCILLIN SODIUM 2000 MG: 2 INJECTION, POWDER, LYOPHILIZED, FOR SOLUTION INTRAMUSCULAR; INTRAVENOUS at 05:20

## 2025-05-03 RX ADMIN — SODIUM CHLORIDE, PRESERVATIVE FREE 10 ML: 5 INJECTION INTRAVENOUS at 11:51

## 2025-05-03 RX ADMIN — SODIUM BICARBONATE: 84 INJECTION, SOLUTION INTRAVENOUS at 21:38

## 2025-05-03 RX ADMIN — NAFCILLIN SODIUM 2000 MG: 2 INJECTION, POWDER, LYOPHILIZED, FOR SOLUTION INTRAMUSCULAR; INTRAVENOUS at 11:45

## 2025-05-03 RX ADMIN — MIDODRINE HYDROCHLORIDE 10 MG: 10 TABLET ORAL at 17:23

## 2025-05-03 RX ADMIN — NAFCILLIN SODIUM 2000 MG: 2 INJECTION, POWDER, LYOPHILIZED, FOR SOLUTION INTRAMUSCULAR; INTRAVENOUS at 14:33

## 2025-05-03 ASSESSMENT — PAIN DESCRIPTION - PAIN TYPE: TYPE: CHRONIC PAIN

## 2025-05-03 ASSESSMENT — PAIN SCALES - GENERAL
PAINLEVEL_OUTOF10: 6
PAINLEVEL_OUTOF10: 8
PAINLEVEL_OUTOF10: 6
PAINLEVEL_OUTOF10: 8
PAINLEVEL_OUTOF10: 8
PAINLEVEL_OUTOF10: 0

## 2025-05-03 ASSESSMENT — PAIN DESCRIPTION - ORIENTATION
ORIENTATION: RIGHT;LEFT;MID
ORIENTATION: LEFT;RIGHT
ORIENTATION: RIGHT;LEFT;MID
ORIENTATION: RIGHT
ORIENTATION: RIGHT

## 2025-05-03 ASSESSMENT — PAIN DESCRIPTION - FREQUENCY: FREQUENCY: CONTINUOUS

## 2025-05-03 ASSESSMENT — PAIN DESCRIPTION - DESCRIPTORS
DESCRIPTORS: ACHING;DISCOMFORT;SORE
DESCRIPTORS: ACHING;DISCOMFORT
DESCRIPTORS: ACHING
DESCRIPTORS: ACHING;DISCOMFORT;SORE
DESCRIPTORS: ACHING

## 2025-05-03 ASSESSMENT — PAIN - FUNCTIONAL ASSESSMENT
PAIN_FUNCTIONAL_ASSESSMENT: ACTIVITIES ARE NOT PREVENTED
PAIN_FUNCTIONAL_ASSESSMENT: PREVENTS OR INTERFERES SOME ACTIVE ACTIVITIES AND ADLS
PAIN_FUNCTIONAL_ASSESSMENT: ACTIVITIES ARE NOT PREVENTED

## 2025-05-03 ASSESSMENT — PAIN DESCRIPTION - ONSET: ONSET: ON-GOING

## 2025-05-03 ASSESSMENT — PAIN DESCRIPTION - LOCATION
LOCATION: GENERALIZED
LOCATION: GENERALIZED
LOCATION: ARM
LOCATION: GENERALIZED
LOCATION: ARM

## 2025-05-03 NOTE — PLAN OF CARE
Problem: Discharge Planning  Goal: Discharge to home or other facility with appropriate resources  Outcome: Not Progressing     Problem: Chronic Conditions and Co-morbidities  Goal: Patient's chronic conditions and co-morbidity symptoms are monitored and maintained or improved  Outcome: Progressing     Problem: Pain  Goal: Verbalizes/displays adequate comfort level or baseline comfort level  Outcome: Progressing     Problem: Safety - Adult  Goal: Free from fall injury  Outcome: Progressing     Problem: ABCDS Injury Assessment  Goal: Absence of physical injury  Outcome: Progressing     Problem: Discharge Planning  Goal: Discharge to home or other facility with appropriate resources  Outcome: Not Progressing

## 2025-05-04 ENCOUNTER — APPOINTMENT (OUTPATIENT)
Dept: MRI IMAGING | Age: 65
DRG: 871 | End: 2025-05-04
Payer: COMMERCIAL

## 2025-05-04 LAB
ACB COMPLEX DNA BLD POS QL NAA+NON-PROBE: NOT DETECTED
ALBUMIN SERPL-MCNC: 2.5 G/DL (ref 3.5–5.2)
ALP SERPL-CCNC: 63 U/L (ref 35–104)
ALT SERPL-CCNC: 106 U/L (ref 0–35)
ANION GAP SERPL CALCULATED.3IONS-SCNC: 14 MMOL/L (ref 7–16)
ANION GAP SERPL CALCULATED.3IONS-SCNC: 14 MMOL/L (ref 7–16)
AST SERPL-CCNC: 139 U/L (ref 0–35)
B FRAGILIS DNA BLD POS QL NAA+NON-PROBE: NOT DETECTED
BASOPHILS # BLD: 0.07 K/UL (ref 0–0.2)
BASOPHILS NFR BLD: 0 % (ref 0–2)
BILIRUB SERPL-MCNC: 2.5 MG/DL (ref 0–1.2)
BIOFIRE TEST COMMENT: ABNORMAL
BUN SERPL-MCNC: 50 MG/DL (ref 8–23)
BUN SERPL-MCNC: 51 MG/DL (ref 8–23)
C ALBICANS DNA BLD POS QL NAA+NON-PROBE: NOT DETECTED
C AURIS DNA BLD POS QL NAA+NON-PROBE: NOT DETECTED
C GATTII+NEOFOR DNA BLD POS QL NAA+N-PRB: NOT DETECTED
C GLABRATA DNA BLD POS QL NAA+NON-PROBE: NOT DETECTED
C KRUSEI DNA BLD POS QL NAA+NON-PROBE: NOT DETECTED
C PARAP DNA BLD POS QL NAA+NON-PROBE: NOT DETECTED
C TROPICLS DNA BLD POS QL NAA+NON-PROBE: NOT DETECTED
CALCIUM SERPL-MCNC: 6.2 MG/DL (ref 8.8–10.2)
CALCIUM SERPL-MCNC: 6.6 MG/DL (ref 8.8–10.2)
CHLORIDE SERPL-SCNC: 96 MMOL/L (ref 98–107)
CHLORIDE SERPL-SCNC: 98 MMOL/L (ref 98–107)
CO2 SERPL-SCNC: 22 MMOL/L (ref 22–29)
CO2 SERPL-SCNC: 23 MMOL/L (ref 22–29)
CREAT SERPL-MCNC: 1.9 MG/DL (ref 0.5–1)
CREAT SERPL-MCNC: 2.1 MG/DL (ref 0.5–1)
E CLOAC COMP DNA BLD POS NAA+NON-PROBE: NOT DETECTED
E COLI DNA BLD POS QL NAA+NON-PROBE: NOT DETECTED
E FAECALIS DNA BLD POS QL NAA+NON-PROBE: NOT DETECTED
E FAECIUM DNA BLD POS QL NAA+NON-PROBE: NOT DETECTED
ENTEROBACTERALES DNA BLD POS NAA+N-PRB: NOT DETECTED
EOSINOPHIL # BLD: 0 K/UL (ref 0.05–0.5)
EOSINOPHILS RELATIVE PERCENT: 0 % (ref 0–6)
ERYTHROCYTE [DISTWIDTH] IN BLOOD BY AUTOMATED COUNT: 14.8 % (ref 11.5–15)
GFR, ESTIMATED: 27 ML/MIN/1.73M2
GFR, ESTIMATED: 30 ML/MIN/1.73M2
GLUCOSE BLD-MCNC: 250 MG/DL (ref 74–99)
GLUCOSE SERPL-MCNC: 157 MG/DL (ref 74–99)
GLUCOSE SERPL-MCNC: 264 MG/DL (ref 74–99)
GP B STREP DNA BLD POS QL NAA+NON-PROBE: NOT DETECTED
HAEM INFLU DNA BLD POS QL NAA+NON-PROBE: NOT DETECTED
HCT VFR BLD AUTO: 29.1 % (ref 34–48)
HGB BLD-MCNC: 10.4 G/DL (ref 11.5–15.5)
IMM GRANULOCYTES # BLD AUTO: 0.22 K/UL (ref 0–0.58)
IMM GRANULOCYTES NFR BLD: 1 % (ref 0–5)
INR PPP: 1.2
K OXYTOCA DNA BLD POS QL NAA+NON-PROBE: NOT DETECTED
KLEBSIELLA SP DNA BLD POS QL NAA+NON-PRB: NOT DETECTED
KLEBSIELLA SP DNA BLD POS QL NAA+NON-PRB: NOT DETECTED
L MONOCYTOG DNA BLD POS QL NAA+NON-PROBE: NOT DETECTED
LACTATE BLDV-SCNC: 1.9 MMOL/L (ref 0.5–2.2)
LYMPHOCYTES NFR BLD: 0.51 K/UL (ref 1.5–4)
LYMPHOCYTES RELATIVE PERCENT: 3 % (ref 20–42)
MAGNESIUM SERPL-MCNC: 2.1 MG/DL (ref 1.6–2.4)
MAGNESIUM SERPL-MCNC: 2.2 MG/DL (ref 1.6–2.4)
MCH RBC QN AUTO: 28 PG (ref 26–35)
MCHC RBC AUTO-ENTMCNC: 35.7 G/DL (ref 32–34.5)
MCV RBC AUTO: 78.4 FL (ref 80–99.9)
MECA+MECC+MREJ ISLT/SPM QL: NOT DETECTED
MICROORGANISM SPEC CULT: ABNORMAL
MICROORGANISM SPEC CULT: ABNORMAL
MICROORGANISM/AGENT SPEC: ABNORMAL
MICROORGANISM/AGENT SPEC: ABNORMAL
MONOCYTES NFR BLD: 0.37 K/UL (ref 0.1–0.95)
MONOCYTES NFR BLD: 2 % (ref 2–12)
N MEN DNA BLD POS QL NAA+NON-PROBE: NOT DETECTED
NEUTROPHILS NFR BLD: 93 % (ref 43–80)
NEUTS SEG NFR BLD: 15.29 K/UL (ref 1.8–7.3)
P AERUGINOSA DNA BLD POS NAA+NON-PROBE: NOT DETECTED
PARTIAL THROMBOPLASTIN TIME: 32.3 SEC (ref 24.5–35.1)
PHOSPHATE SERPL-MCNC: 1.7 MG/DL (ref 2.5–4.5)
PHOSPHATE SERPL-MCNC: 1.9 MG/DL (ref 2.5–4.5)
PLATELET CONFIRMATION: NORMAL
PLATELET, FLUORESCENCE: 34 K/UL (ref 130–450)
PMV BLD AUTO: ABNORMAL FL (ref 7–12)
POTASSIUM SERPL-SCNC: 2.8 MMOL/L (ref 3.5–5.1)
POTASSIUM SERPL-SCNC: 3.3 MMOL/L (ref 3.5–5.1)
PROT SERPL-MCNC: 4.7 G/DL (ref 6.4–8.3)
PROTEUS SP DNA BLD POS QL NAA+NON-PROBE: NOT DETECTED
PROTHROMBIN TIME: 12.8 SEC (ref 9.3–12.4)
RBC # BLD AUTO: 3.71 M/UL (ref 3.5–5.5)
RBC # BLD: ABNORMAL 10*6/UL
S AUREUS DNA BLD POS QL NAA+NON-PROBE: DETECTED
S AUREUS+CONS DNA BLD POS NAA+NON-PROBE: DETECTED
S EPIDERMIDIS DNA BLD POS QL NAA+NON-PRB: NOT DETECTED
S LUGDUNENSIS DNA BLD POS QL NAA+NON-PRB: NOT DETECTED
S MALTOPHILIA DNA BLD POS QL NAA+NON-PRB: NOT DETECTED
S MARCESCENS DNA BLD POS NAA+NON-PROBE: NOT DETECTED
S PNEUM DNA BLD POS QL NAA+NON-PROBE: NOT DETECTED
S PYO DNA BLD POS QL NAA+NON-PROBE: NOT DETECTED
SALMONELLA DNA BLD POS QL NAA+NON-PROBE: NOT DETECTED
SERVICE CMNT-IMP: ABNORMAL
SERVICE CMNT-IMP: ABNORMAL
SODIUM SERPL-SCNC: 133 MMOL/L (ref 136–145)
SODIUM SERPL-SCNC: 135 MMOL/L (ref 136–145)
SPECIMEN DESCRIPTION: ABNORMAL
SPECIMEN DESCRIPTION: ABNORMAL
STREPTOCOCCUS DNA BLD POS NAA+NON-PROBE: NOT DETECTED
WBC # BLD: ABNORMAL 10*3/UL
WBC OTHER # BLD: 16.5 K/UL (ref 4.5–11.5)

## 2025-05-04 PROCEDURE — 2000000000 HC ICU R&B

## 2025-05-04 PROCEDURE — 82962 GLUCOSE BLOOD TEST: CPT

## 2025-05-04 PROCEDURE — 2500000003 HC RX 250 WO HCPCS: Performed by: INTERNAL MEDICINE

## 2025-05-04 PROCEDURE — 2580000003 HC RX 258: Performed by: INTERNAL MEDICINE

## 2025-05-04 PROCEDURE — 02HV33Z INSERTION OF INFUSION DEVICE INTO SUPERIOR VENA CAVA, PERCUTANEOUS APPROACH: ICD-10-PCS | Performed by: INTERNAL MEDICINE

## 2025-05-04 PROCEDURE — 80048 BASIC METABOLIC PNL TOTAL CA: CPT

## 2025-05-04 PROCEDURE — 85610 PROTHROMBIN TIME: CPT

## 2025-05-04 PROCEDURE — 87205 SMEAR GRAM STAIN: CPT

## 2025-05-04 PROCEDURE — 6370000000 HC RX 637 (ALT 250 FOR IP): Performed by: STUDENT IN AN ORGANIZED HEALTH CARE EDUCATION/TRAINING PROGRAM

## 2025-05-04 PROCEDURE — 2500000003 HC RX 250 WO HCPCS: Performed by: STUDENT IN AN ORGANIZED HEALTH CARE EDUCATION/TRAINING PROGRAM

## 2025-05-04 PROCEDURE — 6360000002 HC RX W HCPCS: Performed by: INTERNAL MEDICINE

## 2025-05-04 PROCEDURE — 83735 ASSAY OF MAGNESIUM: CPT

## 2025-05-04 PROCEDURE — 85730 THROMBOPLASTIN TIME PARTIAL: CPT

## 2025-05-04 PROCEDURE — 6370000000 HC RX 637 (ALT 250 FOR IP): Performed by: INTERNAL MEDICINE

## 2025-05-04 PROCEDURE — 83605 ASSAY OF LACTIC ACID: CPT

## 2025-05-04 PROCEDURE — 70551 MRI BRAIN STEM W/O DYE: CPT

## 2025-05-04 PROCEDURE — 80053 COMPREHEN METABOLIC PANEL: CPT

## 2025-05-04 PROCEDURE — 84100 ASSAY OF PHOSPHORUS: CPT

## 2025-05-04 PROCEDURE — 85025 COMPLETE CBC W/AUTO DIFF WBC: CPT

## 2025-05-04 PROCEDURE — 87324 CLOSTRIDIUM AG IA: CPT

## 2025-05-04 PROCEDURE — 87449 NOS EACH ORGANISM AG IA: CPT

## 2025-05-04 RX ORDER — POTASSIUM CHLORIDE 7.45 MG/ML
10 INJECTION INTRAVENOUS
Status: DISPENSED | OUTPATIENT
Start: 2025-05-04 | End: 2025-05-04

## 2025-05-04 RX ORDER — POTASSIUM CHLORIDE 1500 MG/1
40 TABLET, EXTENDED RELEASE ORAL 2 TIMES DAILY WITH MEALS
Status: COMPLETED | OUTPATIENT
Start: 2025-05-04 | End: 2025-05-04

## 2025-05-04 RX ORDER — SODIUM CHLORIDE 9 MG/ML
INJECTION, SOLUTION INTRAVENOUS CONTINUOUS
Status: DISCONTINUED | OUTPATIENT
Start: 2025-05-04 | End: 2025-05-05

## 2025-05-04 RX ORDER — CALCIUM GLUCONATE 20 MG/ML
2000 INJECTION, SOLUTION INTRAVENOUS ONCE
Status: COMPLETED | OUTPATIENT
Start: 2025-05-04 | End: 2025-05-04

## 2025-05-04 RX ADMIN — HYDROCORTISONE SODIUM SUCCINATE 100 MG: 100 INJECTION, POWDER, FOR SOLUTION INTRAMUSCULAR; INTRAVENOUS at 09:57

## 2025-05-04 RX ADMIN — MIDODRINE HYDROCHLORIDE 10 MG: 10 TABLET ORAL at 09:56

## 2025-05-04 RX ADMIN — POTASSIUM CHLORIDE 40 MEQ: 1500 TABLET, EXTENDED RELEASE ORAL at 17:39

## 2025-05-04 RX ADMIN — POTASSIUM CHLORIDE 10 MEQ: 7.46 INJECTION, SOLUTION INTRAVENOUS at 09:46

## 2025-05-04 RX ADMIN — SODIUM CHLORIDE: 0.9 INJECTION, SOLUTION INTRAVENOUS at 14:40

## 2025-05-04 RX ADMIN — NAFCILLIN SODIUM 2000 MG: 2 INJECTION, POWDER, LYOPHILIZED, FOR SOLUTION INTRAMUSCULAR; INTRAVENOUS at 01:33

## 2025-05-04 RX ADMIN — NAFCILLIN SODIUM 2000 MG: 2 INJECTION, POWDER, LYOPHILIZED, FOR SOLUTION INTRAMUSCULAR; INTRAVENOUS at 21:05

## 2025-05-04 RX ADMIN — NAFCILLIN SODIUM 2000 MG: 2 INJECTION, POWDER, LYOPHILIZED, FOR SOLUTION INTRAMUSCULAR; INTRAVENOUS at 09:56

## 2025-05-04 RX ADMIN — OXYCODONE HYDROCHLORIDE 5 MG: 5 TABLET ORAL at 22:49

## 2025-05-04 RX ADMIN — SODIUM CHLORIDE, PRESERVATIVE FREE 40 MG: 5 INJECTION INTRAVENOUS at 09:57

## 2025-05-04 RX ADMIN — HYDROCORTISONE SODIUM SUCCINATE 100 MG: 100 INJECTION, POWDER, FOR SOLUTION INTRAMUSCULAR; INTRAVENOUS at 01:44

## 2025-05-04 RX ADMIN — POTASSIUM PHOSPHATE, MONOBASIC AND POTASSIUM PHOSPHATE, DIBASIC 30 MMOL: 224; 236 INJECTION, SOLUTION, CONCENTRATE INTRAVENOUS at 17:39

## 2025-05-04 RX ADMIN — SODIUM CHLORIDE, PRESERVATIVE FREE 10 ML: 5 INJECTION INTRAVENOUS at 11:07

## 2025-05-04 RX ADMIN — NAFCILLIN SODIUM 2000 MG: 2 INJECTION, POWDER, LYOPHILIZED, FOR SOLUTION INTRAMUSCULAR; INTRAVENOUS at 14:36

## 2025-05-04 RX ADMIN — NAFCILLIN SODIUM 2000 MG: 2 INJECTION, POWDER, LYOPHILIZED, FOR SOLUTION INTRAMUSCULAR; INTRAVENOUS at 17:42

## 2025-05-04 RX ADMIN — NAFCILLIN SODIUM 2000 MG: 2 INJECTION, POWDER, LYOPHILIZED, FOR SOLUTION INTRAMUSCULAR; INTRAVENOUS at 05:21

## 2025-05-04 RX ADMIN — SODIUM CHLORIDE, PRESERVATIVE FREE 10 ML: 5 INJECTION INTRAVENOUS at 21:02

## 2025-05-04 RX ADMIN — HYDROCORTISONE SODIUM SUCCINATE 100 MG: 100 INJECTION, POWDER, FOR SOLUTION INTRAMUSCULAR; INTRAVENOUS at 17:39

## 2025-05-04 RX ADMIN — OXYCODONE HYDROCHLORIDE 5 MG: 5 TABLET ORAL at 17:39

## 2025-05-04 RX ADMIN — Medication 3 MG: at 21:02

## 2025-05-04 RX ADMIN — ATORVASTATIN CALCIUM 20 MG: 20 TABLET, FILM COATED ORAL at 09:56

## 2025-05-04 RX ADMIN — OXYCODONE HYDROCHLORIDE 5 MG: 5 TABLET ORAL at 05:20

## 2025-05-04 RX ADMIN — OXYCODONE HYDROCHLORIDE 5 MG: 5 TABLET ORAL at 09:56

## 2025-05-04 RX ADMIN — CALCIUM GLUCONATE 2000 MG: 20 INJECTION, SOLUTION INTRAVENOUS at 11:06

## 2025-05-04 RX ADMIN — MIDODRINE HYDROCHLORIDE 10 MG: 10 TABLET ORAL at 14:40

## 2025-05-04 RX ADMIN — MIDODRINE HYDROCHLORIDE 10 MG: 10 TABLET ORAL at 17:39

## 2025-05-04 RX ADMIN — HYDROCODONE BITARTRATE AND ACETAMINOPHEN 1 TABLET: 5; 325 TABLET ORAL at 01:58

## 2025-05-04 RX ADMIN — SODIUM BICARBONATE: 84 INJECTION, SOLUTION INTRAVENOUS at 04:27

## 2025-05-04 RX ADMIN — POTASSIUM CHLORIDE 40 MEQ: 1500 TABLET, EXTENDED RELEASE ORAL at 09:56

## 2025-05-04 ASSESSMENT — PAIN - FUNCTIONAL ASSESSMENT: PAIN_FUNCTIONAL_ASSESSMENT: PREVENTS OR INTERFERES SOME ACTIVE ACTIVITIES AND ADLS

## 2025-05-04 ASSESSMENT — PAIN SCALES - GENERAL
PAINLEVEL_OUTOF10: 6
PAINLEVEL_OUTOF10: 3
PAINLEVEL_OUTOF10: 10
PAINLEVEL_OUTOF10: 6
PAINLEVEL_OUTOF10: 7

## 2025-05-04 ASSESSMENT — PAIN DESCRIPTION - ORIENTATION
ORIENTATION: RIGHT
ORIENTATION: RIGHT

## 2025-05-04 ASSESSMENT — PAIN DESCRIPTION - DESCRIPTORS
DESCRIPTORS: HEAVINESS
DESCRIPTORS: SHARP

## 2025-05-04 ASSESSMENT — PAIN DESCRIPTION - PAIN TYPE: TYPE: ACUTE PAIN

## 2025-05-04 ASSESSMENT — PAIN DESCRIPTION - LOCATION
LOCATION: ARM
LOCATION: ARM;LEG

## 2025-05-04 ASSESSMENT — PAIN DESCRIPTION - FREQUENCY: FREQUENCY: CONTINUOUS

## 2025-05-04 ASSESSMENT — PAIN SCALES - WONG BAKER: WONGBAKER_NUMERICALRESPONSE: NO HURT

## 2025-05-05 ENCOUNTER — ANESTHESIA (OUTPATIENT)
Dept: ICU | Age: 65
DRG: 871 | End: 2025-05-05
Payer: COMMERCIAL

## 2025-05-05 ENCOUNTER — ANESTHESIA EVENT (OUTPATIENT)
Dept: ICU | Age: 65
DRG: 871 | End: 2025-05-05
Payer: COMMERCIAL

## 2025-05-05 ENCOUNTER — HOSPITAL ENCOUNTER (INPATIENT)
Age: 65
Discharge: HOME | DRG: 871 | End: 2025-05-07
Attending: INTERNAL MEDICINE
Payer: COMMERCIAL

## 2025-05-05 LAB
ALBUMIN SERPL-MCNC: 2.7 G/DL (ref 3.5–5.2)
ALP SERPL-CCNC: 81 U/L (ref 35–104)
ALT SERPL-CCNC: 71 U/L (ref 0–35)
ANION GAP SERPL CALCULATED.3IONS-SCNC: 15 MMOL/L (ref 7–16)
ANION GAP SERPL CALCULATED.3IONS-SCNC: 20 MMOL/L (ref 7–16)
AST SERPL-CCNC: 73 U/L (ref 0–35)
BASOPHILS # BLD: 0.01 K/UL (ref 0–0.2)
BASOPHILS NFR BLD: 0 % (ref 0–2)
BILIRUB SERPL-MCNC: 1.3 MG/DL (ref 0–1.2)
BUN SERPL-MCNC: 46 MG/DL (ref 8–23)
BUN SERPL-MCNC: 46 MG/DL (ref 8–23)
C DIFF GDH + TOXINS A+B STL QL IA.RAPID: NEGATIVE
CA-I BLD-SCNC: 0.86 MMOL/L (ref 1.15–1.33)
CALCIUM SERPL-MCNC: 6.4 MG/DL (ref 8.8–10.2)
CALCIUM SERPL-MCNC: 6.7 MG/DL (ref 8.8–10.2)
CHLORIDE SERPL-SCNC: 101 MMOL/L (ref 98–107)
CHLORIDE SERPL-SCNC: 103 MMOL/L (ref 98–107)
CO2 SERPL-SCNC: 16 MMOL/L (ref 22–29)
CO2 SERPL-SCNC: 19 MMOL/L (ref 22–29)
CREAT SERPL-MCNC: 1.5 MG/DL (ref 0.5–1)
CREAT SERPL-MCNC: 1.7 MG/DL (ref 0.5–1)
EOSINOPHIL # BLD: 0 K/UL (ref 0.05–0.5)
EOSINOPHILS PERCENT, URINE: 0 % (ref 0–1)
EOSINOPHILS RELATIVE PERCENT: 0 % (ref 0–6)
ERYTHROCYTE [DISTWIDTH] IN BLOOD BY AUTOMATED COUNT: 15.2 % (ref 11.5–15)
GFR, ESTIMATED: 34 ML/MIN/1.73M2
GFR, ESTIMATED: 38 ML/MIN/1.73M2
GLUCOSE SERPL-MCNC: 153 MG/DL (ref 74–99)
GLUCOSE SERPL-MCNC: 185 MG/DL (ref 74–99)
HCT VFR BLD AUTO: 33.4 % (ref 34–48)
HGB BLD-MCNC: 12.6 G/DL (ref 11.5–15.5)
IMM GRANULOCYTES # BLD AUTO: 1.21 K/UL (ref 0–0.58)
IMM GRANULOCYTES NFR BLD: 5 % (ref 0–5)
INR PPP: 1
LYMPHOCYTES NFR BLD: 1.08 K/UL (ref 1.5–4)
LYMPHOCYTES RELATIVE PERCENT: 4 % (ref 20–42)
MAGNESIUM SERPL-MCNC: 2 MG/DL (ref 1.6–2.4)
MCH RBC QN AUTO: 28.9 PG (ref 26–35)
MCHC RBC AUTO-ENTMCNC: 37.7 G/DL (ref 32–34.5)
MCV RBC AUTO: 76.6 FL (ref 80–99.9)
MONOCYTES NFR BLD: 0.62 K/UL (ref 0.1–0.95)
MONOCYTES NFR BLD: 2 % (ref 2–12)
NEUTROPHILS NFR BLD: 89 % (ref 43–80)
NEUTS SEG NFR BLD: 22.62 K/UL (ref 1.8–7.3)
PARTIAL THROMBOPLASTIN TIME: 27.5 SEC (ref 24.5–35.1)
PHOSPHATE SERPL-MCNC: 5.1 MG/DL (ref 2.5–4.5)
PLATELET # BLD AUTO: 75 K/UL (ref 130–450)
PLATELET CONFIRMATION: NORMAL
PMV BLD AUTO: ABNORMAL FL (ref 7–12)
POTASSIUM SERPL-SCNC: 2.8 MMOL/L (ref 3.5–5.1)
POTASSIUM SERPL-SCNC: 3.5 MMOL/L (ref 3.5–5.1)
PROT SERPL-MCNC: 4.8 G/DL (ref 6.4–8.3)
PROTHROMBIN TIME: 10.9 SEC (ref 9.3–12.4)
RBC # BLD AUTO: 4.36 M/UL (ref 3.5–5.5)
SODIUM SERPL-SCNC: 136 MMOL/L (ref 136–145)
SODIUM SERPL-SCNC: 137 MMOL/L (ref 136–145)
SPECIMEN DESCRIPTION: NORMAL
WBC OTHER # BLD: 25.5 K/UL (ref 4.5–11.5)

## 2025-05-05 PROCEDURE — 6360000002 HC RX W HCPCS: Performed by: NURSE ANESTHETIST, CERTIFIED REGISTERED

## 2025-05-05 PROCEDURE — 82330 ASSAY OF CALCIUM: CPT

## 2025-05-05 PROCEDURE — 3E033XZ INTRODUCTION OF VASOPRESSOR INTO PERIPHERAL VEIN, PERCUTANEOUS APPROACH: ICD-10-PCS | Performed by: INTERNAL MEDICINE

## 2025-05-05 PROCEDURE — 80048 BASIC METABOLIC PNL TOTAL CA: CPT

## 2025-05-05 PROCEDURE — 3700000001 HC ADD 15 MINUTES (ANESTHESIA): Performed by: ANESTHESIOLOGY

## 2025-05-05 PROCEDURE — 6360000002 HC RX W HCPCS

## 2025-05-05 PROCEDURE — 84100 ASSAY OF PHOSPHORUS: CPT

## 2025-05-05 PROCEDURE — 2500000003 HC RX 250 WO HCPCS: Performed by: STUDENT IN AN ORGANIZED HEALTH CARE EDUCATION/TRAINING PROGRAM

## 2025-05-05 PROCEDURE — 2580000003 HC RX 258: Performed by: INTERNAL MEDICINE

## 2025-05-05 PROCEDURE — 80053 COMPREHEN METABOLIC PANEL: CPT

## 2025-05-05 PROCEDURE — B24BZZ4 ULTRASONOGRAPHY OF HEART WITH AORTA, TRANSESOPHAGEAL: ICD-10-PCS | Performed by: INTERNAL MEDICINE

## 2025-05-05 PROCEDURE — 6370000000 HC RX 637 (ALT 250 FOR IP): Performed by: STUDENT IN AN ORGANIZED HEALTH CARE EDUCATION/TRAINING PROGRAM

## 2025-05-05 PROCEDURE — 85610 PROTHROMBIN TIME: CPT

## 2025-05-05 PROCEDURE — 6360000002 HC RX W HCPCS: Performed by: INTERNAL MEDICINE

## 2025-05-05 PROCEDURE — 2500000003 HC RX 250 WO HCPCS: Performed by: INTERNAL MEDICINE

## 2025-05-05 PROCEDURE — 2060000000 HC ICU INTERMEDIATE R&B

## 2025-05-05 PROCEDURE — 6370000000 HC RX 637 (ALT 250 FOR IP): Performed by: INTERNAL MEDICINE

## 2025-05-05 PROCEDURE — 85730 THROMBOPLASTIN TIME PARTIAL: CPT

## 2025-05-05 PROCEDURE — 3700000000 HC ANESTHESIA ATTENDED CARE: Performed by: ANESTHESIOLOGY

## 2025-05-05 PROCEDURE — 2580000003 HC RX 258

## 2025-05-05 PROCEDURE — 85025 COMPLETE CBC W/AUTO DIFF WBC: CPT

## 2025-05-05 PROCEDURE — 83735 ASSAY OF MAGNESIUM: CPT

## 2025-05-05 RX ORDER — HYDROCORTISONE SODIUM SUCCINATE 100 MG/2ML
50 INJECTION INTRAMUSCULAR; INTRAVENOUS EVERY 8 HOURS
Status: DISCONTINUED | OUTPATIENT
Start: 2025-05-05 | End: 2025-05-06

## 2025-05-05 RX ORDER — SODIUM CHLORIDE, SODIUM LACTATE, POTASSIUM CHLORIDE, CALCIUM CHLORIDE 600; 310; 30; 20 MG/100ML; MG/100ML; MG/100ML; MG/100ML
INJECTION, SOLUTION INTRAVENOUS CONTINUOUS
Status: DISCONTINUED | OUTPATIENT
Start: 2025-05-05 | End: 2025-05-06

## 2025-05-05 RX ORDER — MIDODRINE HYDROCHLORIDE 5 MG/1
5 TABLET ORAL
Status: DISCONTINUED | OUTPATIENT
Start: 2025-05-05 | End: 2025-05-06

## 2025-05-05 RX ORDER — CALCIUM GLUCONATE 20 MG/ML
1000 INJECTION, SOLUTION INTRAVENOUS ONCE
Status: COMPLETED | OUTPATIENT
Start: 2025-05-05 | End: 2025-05-05

## 2025-05-05 RX ORDER — PROPOFOL 10 MG/ML
INJECTION, EMULSION INTRAVENOUS
Status: DISCONTINUED | OUTPATIENT
Start: 2025-05-05 | End: 2025-05-05 | Stop reason: SDUPTHER

## 2025-05-05 RX ORDER — CALCIUM GLUCONATE 20 MG/ML
2000 INJECTION, SOLUTION INTRAVENOUS ONCE
Status: COMPLETED | OUTPATIENT
Start: 2025-05-05 | End: 2025-05-05

## 2025-05-05 RX ADMIN — MIDODRINE HYDROCHLORIDE 5 MG: 5 TABLET ORAL at 17:05

## 2025-05-05 RX ADMIN — SODIUM CHLORIDE, PRESERVATIVE FREE 10 ML: 5 INJECTION INTRAVENOUS at 20:08

## 2025-05-05 RX ADMIN — MIDODRINE HYDROCHLORIDE 5 MG: 5 TABLET ORAL at 12:10

## 2025-05-05 RX ADMIN — NAFCILLIN SODIUM 2000 MG: 2 INJECTION, POWDER, LYOPHILIZED, FOR SOLUTION INTRAMUSCULAR; INTRAVENOUS at 20:57

## 2025-05-05 RX ADMIN — SODIUM CHLORIDE, PRESERVATIVE FREE 40 MG: 5 INJECTION INTRAVENOUS at 08:27

## 2025-05-05 RX ADMIN — CALCIUM GLUCONATE 1000 MG: 20 INJECTION, SOLUTION INTRAVENOUS at 17:22

## 2025-05-05 RX ADMIN — SODIUM CHLORIDE, PRESERVATIVE FREE 10 ML: 5 INJECTION INTRAVENOUS at 01:36

## 2025-05-05 RX ADMIN — NAFCILLIN SODIUM 2000 MG: 2 INJECTION, POWDER, LYOPHILIZED, FOR SOLUTION INTRAMUSCULAR; INTRAVENOUS at 05:42

## 2025-05-05 RX ADMIN — HYDROCORTISONE SODIUM SUCCINATE 50 MG: 100 INJECTION, POWDER, FOR SOLUTION INTRAMUSCULAR; INTRAVENOUS at 10:16

## 2025-05-05 RX ADMIN — NAFCILLIN SODIUM 2000 MG: 2 INJECTION, POWDER, LYOPHILIZED, FOR SOLUTION INTRAMUSCULAR; INTRAVENOUS at 01:39

## 2025-05-05 RX ADMIN — ATORVASTATIN CALCIUM 20 MG: 20 TABLET, FILM COATED ORAL at 08:27

## 2025-05-05 RX ADMIN — NAFCILLIN SODIUM 2000 MG: 2 INJECTION, POWDER, LYOPHILIZED, FOR SOLUTION INTRAMUSCULAR; INTRAVENOUS at 17:22

## 2025-05-05 RX ADMIN — OXYCODONE HYDROCHLORIDE 5 MG: 5 TABLET ORAL at 10:14

## 2025-05-05 RX ADMIN — OXYCODONE HYDROCHLORIDE 5 MG: 5 TABLET ORAL at 22:27

## 2025-05-05 RX ADMIN — POTASSIUM PHOSPHATE, MONOBASIC AND POTASSIUM PHOSPHATE, DIBASIC 30 MMOL: 224; 236 INJECTION, SOLUTION, CONCENTRATE INTRAVENOUS at 00:14

## 2025-05-05 RX ADMIN — SODIUM CHLORIDE, PRESERVATIVE FREE 10 ML: 5 INJECTION INTRAVENOUS at 09:12

## 2025-05-05 RX ADMIN — SODIUM CHLORIDE: 0.9 INJECTION, SOLUTION INTRAVENOUS at 00:56

## 2025-05-05 RX ADMIN — NAFCILLIN SODIUM 2000 MG: 2 INJECTION, POWDER, LYOPHILIZED, FOR SOLUTION INTRAMUSCULAR; INTRAVENOUS at 13:08

## 2025-05-05 RX ADMIN — Medication 3 MG: at 20:08

## 2025-05-05 RX ADMIN — NAFCILLIN SODIUM 2000 MG: 2 INJECTION, POWDER, LYOPHILIZED, FOR SOLUTION INTRAMUSCULAR; INTRAVENOUS at 08:41

## 2025-05-05 RX ADMIN — HYDROCORTISONE SODIUM SUCCINATE 50 MG: 100 INJECTION, POWDER, FOR SOLUTION INTRAMUSCULAR; INTRAVENOUS at 17:06

## 2025-05-05 RX ADMIN — HYDROCORTISONE SODIUM SUCCINATE 100 MG: 100 INJECTION, POWDER, FOR SOLUTION INTRAMUSCULAR; INTRAVENOUS at 01:35

## 2025-05-05 RX ADMIN — CALCIUM GLUCONATE 2000 MG: 20 INJECTION, SOLUTION INTRAVENOUS at 13:08

## 2025-05-05 RX ADMIN — PROPOFOL 140 MG: 10 INJECTION, EMULSION INTRAVENOUS at 15:36

## 2025-05-05 RX ADMIN — SODIUM CHLORIDE, SODIUM LACTATE, POTASSIUM CHLORIDE, AND CALCIUM CHLORIDE: .6; .31; .03; .02 INJECTION, SOLUTION INTRAVENOUS at 12:16

## 2025-05-05 RX ADMIN — MIDODRINE HYDROCHLORIDE 10 MG: 10 TABLET ORAL at 08:26

## 2025-05-05 ASSESSMENT — PAIN SCALES - GENERAL
PAINLEVEL_OUTOF10: 0
PAINLEVEL_OUTOF10: 0

## 2025-05-05 ASSESSMENT — PAIN DESCRIPTION - LOCATION: LOCATION: ARM

## 2025-05-05 ASSESSMENT — PAIN DESCRIPTION - DESCRIPTORS: DESCRIPTORS: HEAVINESS;PRESSURE

## 2025-05-05 NOTE — CARE COORDINATION
Care Coordination: LOS 3 day.  Met with pt and sister Leticia at bedside to discuss transition of care needs. Lives alone, independent, drove to er and car is parked outside. No DME, ANEL or HHC. Plan is home at discharge and she can transport herself, she also has family. ID recommending ICD removal as device compromise is presumed in setting of Stah aureus bacteremia.  Nafcillin 2 g q4h.Plan for CIERA,  EP following. Pt upset that she has been waiting all day for CIERA, call to Bailey at [phone] and states she is scheduled to be picked up anytime.  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  Will discuss further with her when she is not frustrated with the long wait for procedure.  Will follow    Electronically signed by America Murillo RN on 5/5/2025 at 2:52 PM

## 2025-05-05 NOTE — ACP (ADVANCE CARE PLANNING)
Advance Care Planning   Healthcare Decision Maker:    Primary Decision Maker: Rosetta Cheng-Frieda first - Brother/Sister - [phone]    Primary Decision Maker: Leticia Robins - Brother/Sister - [phone]    Secondary Decision Maker: Karin Phoenix - Niece/Nephew - [phone]    Click here to complete Healthcare Decision Makers including selection of the Healthcare Decision Maker Relationship (ie \"Primary\").  Today we documented Decision Maker(s) consistent with Legal Next of Kin hierarchy.

## 2025-05-06 VITALS
HEART RATE: 89 BPM | OXYGEN SATURATION: 98 % | RESPIRATION RATE: 17 BRPM | BODY MASS INDEX: 28.52 KG/M2 | DIASTOLIC BLOOD PRESSURE: 70 MMHG | WEIGHT: 160.94 LBS | HEIGHT: 63 IN | TEMPERATURE: 98.4 F | SYSTOLIC BLOOD PRESSURE: 114 MMHG

## 2025-05-06 LAB
25(OH)D3 SERPL-MCNC: 6.8 NG/ML (ref 30–100)
ANION GAP SERPL CALCULATED.3IONS-SCNC: 16 MMOL/L (ref 7–16)
BASOPHILS # BLD: 0 K/UL (ref 0–0.2)
BASOPHILS # BLD: 0 K/UL (ref 0–0.2)
BASOPHILS NFR BLD: 0 % (ref 0–2)
BASOPHILS NFR BLD: 0 % (ref 0–2)
BUN SERPL-MCNC: 40 MG/DL (ref 8–23)
CA-I BLD-SCNC: 0.99 MMOL/L (ref 1.15–1.33)
CALCIUM SERPL-MCNC: 6.7 MG/DL (ref 8.8–10.2)
CHLORIDE SERPL-SCNC: 103 MMOL/L (ref 98–107)
CO2 SERPL-SCNC: 18 MMOL/L (ref 22–29)
CREAT SERPL-MCNC: 1.4 MG/DL (ref 0.5–1)
EOSINOPHIL # BLD: 0 K/UL (ref 0.05–0.5)
EOSINOPHIL # BLD: 0 K/UL (ref 0.05–0.5)
EOSINOPHILS RELATIVE PERCENT: 0 % (ref 0–6)
EOSINOPHILS RELATIVE PERCENT: 0 % (ref 0–6)
ERYTHROCYTE [DISTWIDTH] IN BLOOD BY AUTOMATED COUNT: 15.9 % (ref 11.5–15)
ERYTHROCYTE [DISTWIDTH] IN BLOOD BY AUTOMATED COUNT: 15.9 % (ref 11.5–15)
GFR, ESTIMATED: 43 ML/MIN/1.73M2
GLUCOSE SERPL-MCNC: 138 MG/DL (ref 74–99)
HAPTOGLOB SERPL-MCNC: 150 MG/DL (ref 30–200)
HCT VFR BLD AUTO: 28.8 % (ref 34–48)
HCT VFR BLD AUTO: 30.4 % (ref 34–48)
HGB BLD-MCNC: 10.7 G/DL (ref 11.5–15.5)
HGB BLD-MCNC: 11.1 G/DL (ref 11.5–15.5)
IMM RETICS NFR: 10.8 % (ref 3–15.9)
LDH SERPL-CCNC: 530 U/L (ref 135–214)
LYMPHOCYTES NFR BLD: 0.38 K/UL (ref 1.5–4)
LYMPHOCYTES NFR BLD: 0.63 K/UL (ref 1.5–4)
LYMPHOCYTES RELATIVE PERCENT: 2 % (ref 20–42)
LYMPHOCYTES RELATIVE PERCENT: 3 % (ref 20–42)
MAGNESIUM SERPL-MCNC: 1.9 MG/DL (ref 1.6–2.4)
MAGNESIUM SERPL-MCNC: 1.9 MG/DL (ref 1.6–2.4)
MCH RBC QN AUTO: 28.4 PG (ref 26–35)
MCH RBC QN AUTO: 28.6 PG (ref 26–35)
MCHC RBC AUTO-ENTMCNC: 36.5 G/DL (ref 32–34.5)
MCHC RBC AUTO-ENTMCNC: 37.2 G/DL (ref 32–34.5)
MCV RBC AUTO: 77 FL (ref 80–99.9)
MCV RBC AUTO: 77.7 FL (ref 80–99.9)
METAMYELOCYTES ABSOLUTE COUNT: 0.56 K/UL (ref 0–0.12)
METAMYELOCYTES: 3 % (ref 0–1)
MONOCYTES NFR BLD: 0.19 K/UL (ref 0.1–0.95)
MONOCYTES NFR BLD: 0.84 K/UL (ref 0.1–0.95)
MONOCYTES NFR BLD: 1 % (ref 2–12)
MONOCYTES NFR BLD: 4 % (ref 2–12)
MYELOCYTES ABSOLUTE COUNT: 0.42 K/UL
MYELOCYTES: 2 %
NEUTROPHILS NFR BLD: 91 % (ref 43–80)
NEUTROPHILS NFR BLD: 95 % (ref 43–80)
NEUTS SEG NFR BLD: 19.2 K/UL (ref 1.8–7.3)
NEUTS SEG NFR BLD: 20.47 K/UL (ref 1.8–7.3)
PHOSPHATE SERPL-MCNC: 3.2 MG/DL (ref 2.5–4.5)
PLATELET # BLD AUTO: 59 K/UL (ref 130–450)
PLATELET CONFIRMATION: NORMAL
PLATELET CONFIRMATION: NORMAL
PLATELET, FLUORESCENCE: 58 K/UL (ref 130–450)
PLATELETS.RETICULATED NFR BLD AUTO: 11.7 % (ref 1.1–10.3)
PMV BLD AUTO: 12.3 FL (ref 7–12)
PMV BLD AUTO: 12.7 FL (ref 7–12)
POTASSIUM SERPL-SCNC: 2.9 MMOL/L (ref 3.5–5.1)
RBC # BLD AUTO: 3.74 M/UL (ref 3.5–5.5)
RBC # BLD AUTO: 3.87 M/UL (ref 3.5–5.5)
RBC # BLD: ABNORMAL 10*6/UL
RETIC HEMOGLOBIN: 29 PG (ref 28.2–36.6)
RETICS # AUTO: 0.02 M/UL
RETICS/RBC NFR AUTO: 0.4 % (ref 0.4–1.9)
SODIUM SERPL-SCNC: 137 MMOL/L (ref 136–145)
WBC # BLD: ABNORMAL 10*3/UL
WBC OTHER # BLD: 21.1 K/UL (ref 4.5–11.5)
WBC OTHER # BLD: 21.6 K/UL (ref 4.5–11.5)

## 2025-05-06 PROCEDURE — 97166 OT EVAL MOD COMPLEX 45 MIN: CPT

## 2025-05-06 PROCEDURE — 6360000002 HC RX W HCPCS: Performed by: STUDENT IN AN ORGANIZED HEALTH CARE EDUCATION/TRAINING PROGRAM

## 2025-05-06 PROCEDURE — 83615 LACTATE (LD) (LDH) ENZYME: CPT

## 2025-05-06 PROCEDURE — 2580000003 HC RX 258: Performed by: INTERNAL MEDICINE

## 2025-05-06 PROCEDURE — 6370000000 HC RX 637 (ALT 250 FOR IP): Performed by: INTERNAL MEDICINE

## 2025-05-06 PROCEDURE — 6360000002 HC RX W HCPCS: Performed by: INTERNAL MEDICINE

## 2025-05-06 PROCEDURE — 97530 THERAPEUTIC ACTIVITIES: CPT

## 2025-05-06 PROCEDURE — 2500000003 HC RX 250 WO HCPCS: Performed by: INTERNAL MEDICINE

## 2025-05-06 PROCEDURE — 97535 SELF CARE MNGMENT TRAINING: CPT

## 2025-05-06 PROCEDURE — 6370000000 HC RX 637 (ALT 250 FOR IP): Performed by: STUDENT IN AN ORGANIZED HEALTH CARE EDUCATION/TRAINING PROGRAM

## 2025-05-06 PROCEDURE — 2500000003 HC RX 250 WO HCPCS: Performed by: STUDENT IN AN ORGANIZED HEALTH CARE EDUCATION/TRAINING PROGRAM

## 2025-05-06 PROCEDURE — 84100 ASSAY OF PHOSPHORUS: CPT

## 2025-05-06 PROCEDURE — 80048 BASIC METABOLIC PNL TOTAL CA: CPT

## 2025-05-06 PROCEDURE — 85025 COMPLETE CBC W/AUTO DIFF WBC: CPT

## 2025-05-06 PROCEDURE — 85045 AUTOMATED RETICULOCYTE COUNT: CPT

## 2025-05-06 PROCEDURE — 97162 PT EVAL MOD COMPLEX 30 MIN: CPT

## 2025-05-06 PROCEDURE — 82330 ASSAY OF CALCIUM: CPT

## 2025-05-06 PROCEDURE — 82306 VITAMIN D 25 HYDROXY: CPT

## 2025-05-06 PROCEDURE — 83735 ASSAY OF MAGNESIUM: CPT

## 2025-05-06 PROCEDURE — 83010 ASSAY OF HAPTOGLOBIN QUANT: CPT

## 2025-05-06 RX ORDER — CALCIUM GLUCONATE 20 MG/ML
1000 INJECTION, SOLUTION INTRAVENOUS ONCE
Status: COMPLETED | OUTPATIENT
Start: 2025-05-06 | End: 2025-05-06

## 2025-05-06 RX ORDER — CHOLECALCIFEROL (VITAMIN D3) 50 MCG
2000 TABLET ORAL DAILY
Status: DISCONTINUED | OUTPATIENT
Start: 2025-05-06 | End: 2025-05-06 | Stop reason: HOSPADM

## 2025-05-06 RX ORDER — OXYCODONE HYDROCHLORIDE 5 MG/1
5 TABLET ORAL EVERY 6 HOURS PRN
Refills: 0 | Status: DISCONTINUED | OUTPATIENT
Start: 2025-05-06 | End: 2025-05-06 | Stop reason: HOSPADM

## 2025-05-06 RX ORDER — HYDROCORTISONE SODIUM SUCCINATE 100 MG/2ML
25 INJECTION INTRAMUSCULAR; INTRAVENOUS EVERY 12 HOURS
Status: DISCONTINUED | OUTPATIENT
Start: 2025-05-06 | End: 2025-05-06 | Stop reason: HOSPADM

## 2025-05-06 RX ADMIN — SODIUM CHLORIDE, PRESERVATIVE FREE 40 MG: 5 INJECTION INTRAVENOUS at 09:16

## 2025-05-06 RX ADMIN — HYDROCORTISONE SODIUM SUCCINATE 50 MG: 100 INJECTION, POWDER, FOR SOLUTION INTRAMUSCULAR; INTRAVENOUS at 01:50

## 2025-05-06 RX ADMIN — NAFCILLIN SODIUM 2000 MG: 2 INJECTION, POWDER, LYOPHILIZED, FOR SOLUTION INTRAMUSCULAR; INTRAVENOUS at 05:04

## 2025-05-06 RX ADMIN — HYDROCORTISONE SODIUM SUCCINATE 25 MG: 100 INJECTION, POWDER, FOR SOLUTION INTRAMUSCULAR; INTRAVENOUS at 14:07

## 2025-05-06 RX ADMIN — ATORVASTATIN CALCIUM 20 MG: 20 TABLET, FILM COATED ORAL at 09:16

## 2025-05-06 RX ADMIN — CALCIUM GLUCONATE 1000 MG: 20 INJECTION, SOLUTION INTRAVENOUS at 10:25

## 2025-05-06 RX ADMIN — POTASSIUM BICARBONATE 40 MEQ: 782 TABLET, EFFERVESCENT ORAL at 09:46

## 2025-05-06 RX ADMIN — SODIUM CHLORIDE, PRESERVATIVE FREE 10 ML: 5 INJECTION INTRAVENOUS at 09:16

## 2025-05-06 RX ADMIN — CALCIUM GLUCONATE 1000 MG: 20 INJECTION, SOLUTION INTRAVENOUS at 06:53

## 2025-05-06 RX ADMIN — NAFCILLIN SODIUM 2000 MG: 2 INJECTION, POWDER, LYOPHILIZED, FOR SOLUTION INTRAMUSCULAR; INTRAVENOUS at 09:23

## 2025-05-06 RX ADMIN — NAFCILLIN SODIUM 2000 MG: 2 INJECTION, POWDER, LYOPHILIZED, FOR SOLUTION INTRAMUSCULAR; INTRAVENOUS at 01:46

## 2025-05-06 RX ADMIN — NAFCILLIN SODIUM 2000 MG: 2 INJECTION, POWDER, LYOPHILIZED, FOR SOLUTION INTRAMUSCULAR; INTRAVENOUS at 14:15

## 2025-05-06 RX ADMIN — POTASSIUM BICARBONATE 40 MEQ: 782 TABLET, EFFERVESCENT ORAL at 06:49

## 2025-05-06 RX ADMIN — OXYCODONE HYDROCHLORIDE 5 MG: 5 TABLET ORAL at 05:03

## 2025-05-06 ASSESSMENT — PAIN SCALES - GENERAL
PAINLEVEL_OUTOF10: 0

## 2025-05-06 NOTE — CARE COORDINATION
Patient is accepted by Dr Werner Bledsoe cardiologist at [facility].She will will be transferred whenever bed available.

## 2025-05-06 NOTE — CONSULTS
5/2/2025 3:35 PM  Vani Prosper  [id number]     Chief Complaint:    Urinary retention      History of Present Illness:      The patient is a 64 y.o. female patient who presented to the hospital with complaints of weakness and joint pain. She was admitted to the ICU for septic shock. She has not urinated. Attempts to place a barroso were unsuccessful and urology was consulted. She has never seen a Urologist in the past.     Past Medical History:   Diagnosis Date    CHF (congestive heart failure) ([facility])     HFrEF (heart failure with reduced ejection fraction) ([facility]) 01/17/2020 1/14/20- echo- LVEF 15%, stage I DD, RVSF reduced, LA severely dilated, LBBB, severe MR, mild TR, :LVDD: 6.5, RVDD: 3.0    LBBB (left bundle branch block)     Nonischemic cardiomyopathy ([facility])          Past Surgical History:   Procedure Laterality Date    CARDIAC CATHETERIZATION  01/16/2020    DR Eagle    CARDIAC DEFIBRILLATOR PLACEMENT  12/18/2020    BIV ICD    (MEDTRONIC)    DR. BELTRAN    COLONOSCOPY         Medications Prior to Admission:    Medications Prior to Admission: empagliflozin (JARDIANCE) 10 MG tablet, Take 1 tablet by mouth daily  bumetanide (BUMEX) 2 MG tablet, Take 1 tablet by mouth daily as needed (>3# weight gain in a day, increased shortness of breath or swelling.)  spironolactone (ALDACTONE) 25 MG tablet, Take 1 tablet by mouth daily  metoprolol succinate (TOPROL XL) 50 MG extended release tablet, Take 1 tablet by mouth daily  sacubitril-valsartan (ENTRESTO)  MG per tablet, Take 1 tablet by mouth 2 times daily  atorvastatin (LIPITOR) 20 MG tablet, TAKE 1 TABLET BY MOUTH EVERY DAY  OYSCO 500 + D 500-200 MG-UNIT per tablet, daily  vitamin D3 (CHOLECALCIFEROL) 25 MCG (1000 UT) TABS tablet, TAKE 1 TABLET BY MOUTH EVERY DAY  pantoprazole (PROTONIX) 40 MG tablet, Take 1 tablet by mouth every morning PCP manages script, via [facility].    Allergies:    Patient has no known allergies.    Social History:    reports that she has 
[facility]  MD ALEKSANDR Pace MD .  Consultation  Patient's Name: Vani Cheng  12:42 PM  5/3/2025    Nephrologist: Aleksandr Whiting MD    Reason for Consult: Acute kidney injury  Requesting Physician:  Tawana Guzman MD    Chief Complaint: Fatigue    History Obtained From: Patient record and staff    History of Present Ilness:      64-year-old female presenting with a chief complaint of being fatigued  She is being found to have MSSA bacteremia  She has a history of nonischemic cardiomyopathy EF 25% ICD left bundle branch block among other medical problem  Nephrology asked to see for ROSELYN  Baseline creatinine 0.7 she presented with a creatinine of 2.3  She is seen in her room she is a pleasant female she is on O2 via nasal cannula she appears euvolemic  She is on neoepinephrine she is on nafcillin and she is supposed to be on bicarb drip  She reports to me no nausea no vomiting no diarrhea no chest pain no breathing problem    Past Medical History:   Diagnosis Date    CHF (congestive heart failure) ([facility])     HFrEF (heart failure with reduced ejection fraction) ([facility]) 01/17/2020 1/14/20- echo- LVEF 15%, stage I DD, RVSF reduced, LA severely dilated, LBBB, severe MR, mild TR, :LVDD: 6.5, RVDD: 3.0    LBBB (left bundle branch block)     Nonischemic cardiomyopathy ([facility])        Past Surgical History:   Procedure Laterality Date    CARDIAC CATHETERIZATION  01/16/2020    DR Eagle    CARDIAC DEFIBRILLATOR PLACEMENT  12/18/2020    BIV ICD    ([employer])    DR. BELTRAN    COLONOSCOPY         Family History   Problem Relation Age of Onset    Stroke Mother     No Known Problems Sister     No Known Problems Sister     No Known Problems Sister         reports that she has never smoked. She has never used smokeless tobacco. She reports that she does not currently use alcohol. She reports that she does not use drugs.    Allergies:  Patient has no known allergies.    Current Medications:    sodium 
Critical Care Consult Note    Patient - Vani Cheng   MRN -  [id number]   [facility] # - [id number]   - 1960      Date of Admission -  2025 12:13 AM  Date of evaluation -  2025  4405/4405-A   Hospital Day - 0          ADMIT/CONSULT DETAILS     Reason for Admit/Consult   Septic shock    Consulting Service/Physician   Consulting - Dustin Mix MD  Primary Care Physician - Tawana Guzman MD         HPI   64-year-old female with past medical history of nonischemic cardiomyopathy,, HFrEF with EF of 25%, status post CRT-D with pacing 99% of the time, left bundle branch block presented with weakness and joint pain.  She describes that joint pains are diffuse all over the body and is started about a week ago.  No specific point tenderness in any joint.  She is found to be in septic shock with lactic acidosis on presentation.  Levophed drip was started.  Blood cultures are positive for 4 out of 4 gram-positive cocci in clusters.  Admitted in MICU for septic shock.      Past Medical History         Diagnosis Date    CHF (congestive heart failure) ([facility])     HFrEF (heart failure with reduced ejection fraction) ([facility]) 2020- echo- LVEF 15%, stage I DD, RVSF reduced, LA severely dilated, LBBB, severe MR, mild TR, :LVDD: 6.5, RVDD: 3.0    LBBB (left bundle branch block)     Nonischemic cardiomyopathy ([facility])         Past Surgical History           Procedure Laterality Date    CARDIAC CATHETERIZATION  2020    DR Eagle    CARDIAC DEFIBRILLATOR PLACEMENT  2020    BIV ICD    ([employer])    DR. BELTRAN    COLONOSCOPY           Current Medications   Current Medications    atorvastatin  20 mg Oral Daily    sodium chloride flush  5-40 mL IntraVENous 2 times per day    [Held by provider] enoxaparin  30 mg SubCUTAneous Daily    melatonin  3 mg Oral Nightly    polyethylene glycol  17 g Oral Daily    sennosides-docusate sodium  1 tablet Oral BID    cefepime  1,000 mg IntraVENous Q12H    vancomycin 
[facility] CONSULT NOTE    Reason for Consult: Bacteremia   Requested by: Sky Keene MD       Chief complaint: Generalized body pain    History Obtained From: Patient and EMR     HISTORY OFPRESENT ILLNESS              The patient is a 64 y.o. female with history of CHF, non-ischemic cardiomyopathy, ICD in situ, presented on 05/01 with sudden onset generalized body pain for 3 days found to be in septic shock with Gram-positive cocci in clusters (Staphylococcus aureus with no mecA/C resistant gene detected by PCR). CXR showed no acute process. CT abdomen and pelvis showed no acute intraabdominal or pelvic process. She received a dose of piperacillin-tazobactam on admission. Cefepime and vancomycin were started on admission. ID service was subsequently consulted for further recommendations.    Past Medical History  Past Medical History:   Diagnosis Date    CHF (congestive heart failure) ([facility])     HFrEF (heart failure with reduced ejection fraction) ([facility]) 01/17/2020 1/14/20- echo- LVEF 15%, stage I DD, RVSF reduced, LA severely dilated, LBBB, severe MR, mild TR, :LVDD: 6.5, RVDD: 3.0    LBBB (left bundle branch block)     Nonischemic cardiomyopathy ([facility])        Current Facility-Administered Medications   Medication Dose Route Frequency Provider Last Rate Last Admin    norepinephrine (LEVOPHED) 16 mg in sodium chloride 0.9 % 250 mL infusion (premix)  1-100 mcg/min IntraVENous Continuous Dustin Mix MD 4.7 mL/hr at 05/02/25 0852 5 mcg/min at 05/02/25 0852    atorvastatin (LIPITOR) tablet 20 mg  20 mg Oral Daily Dustin Mix MD   20 mg at 05/02/25 1101    sodium chloride flush 0.9 % injection 5-40 mL  5-40 mL IntraVENous 2 times per day Dustin Mix MD        sodium chloride flush 0.9 % injection 5-40 mL  5-40 mL IntraVENous PRN Dustin Mix MD        0.9 % sodium chloride infusion   IntraVENous PRN Dustin Mix MD        [Held by provider] enoxaparin Sodium (LOVENOX) injection 30 mg  30 mg 
[facility]  Consultation Report  PATIENT: Vani Cheng  MEDICAL RECORD NUMBER: [id number]  DATE OF SERVICE:  5/2/2025  ATTENDING ELECTROPHYSIOLOGIST: Devante Ely MD  PRIMARY ELECTROPHYSIOLOGIST: Devante Ely MD  REFERRING PHYSICIAN: No ref. provider found and Tawana Guzman MD  CHIEF COMPLAINT: Generalized muscle aching    HPI: This is a 64 y.o. female with a history of   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HCC)    History of implantable cardioverter-defibrillator (ICD) placement    Postmenopausal osteoporosis    Septic shock (HCC)   who presented to the hospital on 5/1/25 complaining of generalized muscle aching and pain. The patient has history of nonischemic cardiomyopathy, chronic HFrecEF, LBBB,  status post CRT-D 12/18/20, mitral regurgitation and vitamin D deficiency. The patient presented to the hospital complaining of generalized muscle aching and pain since 4/28/25. She came in to ED on 5/1/25 and was found to be hypotensive with SBP in 70's despite IV fluid resuscitation. Work up showed ROSELYN, hypokalemia thrombocytopenia and lactic acidosis. She was admitted to ICU and was started on Levophed. She was started on IV Vancomycin and Zosyn. Blood cultures 2 out of 2 came back today and showed gram positive cocci in clusters. The patient denies any chest pain, dyspnea, palpitations, dizziness, syncope, orthopnea or paroxysmal nocturnal dyspnea. Cardiac electrophysiology service is consulted for CIED with suspected system infection.    Patient Active Problem List    Diagnosis Date Noted    Septic shock (HCC) 05/02/2025 
09:26 AM    PLT 59 05/06/2025 09:26 AM    MCV 77.7 05/06/2025 09:26 AM    MCH 28.4 05/06/2025 09:26 AM    MCHC 36.5 05/06/2025 09:26 AM    RDW 15.9 05/06/2025 09:26 AM    METASPCT 3 05/06/2025 04:53 AM    LYMPHOPCT 3 05/06/2025 09:26 AM    MONOPCT 4 05/06/2025 09:26 AM    MYELOPCT 2 05/06/2025 09:26 AM    EOSPCT 0 05/06/2025 09:26 AM    BASOPCT 0 05/06/2025 09:26 AM    MONOSABS 0.84 05/06/2025 09:26 AM    LYMPHSABS 0.63 05/06/2025 09:26 AM    EOSABS 0.00 05/06/2025 09:26 AM    BASOSABS 0.00 05/06/2025 09:26 AM     CMP:    Lab Results   Component Value Date/Time     05/06/2025 04:53 AM    K 2.9 05/06/2025 04:53 AM    K 4.0 01/13/2020 07:38 AM     05/06/2025 04:53 AM    CO2 18 05/06/2025 04:53 AM    BUN 40 05/06/2025 04:53 AM    CREATININE 1.4 05/06/2025 04:53 AM    GFRAA >60 08/22/2022 11:54 AM    LABGLOM 43 05/06/2025 04:53 AM    LABGLOM >60 07/31/2023 11:51 AM    GLUCOSE 138 05/06/2025 04:53 AM    CALCIUM 6.7 05/06/2025 04:53 AM    BILITOT 1.3 05/05/2025 05:15 AM    ALKPHOS 81 05/05/2025 05:15 AM    AST 73 05/05/2025 05:15 AM    ALT 71 05/05/2025 05:15 AM     Hepatic Function Panel:    Lab Results   Component Value Date/Time    ALKPHOS 81 05/05/2025 05:15 AM    ALT 71 05/05/2025 05:15 AM    AST 73 05/05/2025 05:15 AM    BILITOT 1.3 05/05/2025 05:15 AM     PT/INR:    Lab Results   Component Value Date/Time    PROTIME 10.9 05/05/2025 05:15 AM    INR 1.0 05/05/2025 05:15 AM     PTT:    Lab Results   Component Value Date/Time    APTT 27.5 05/05/2025 05:15 AM   [APTT}  Last 3 Troponin:    Lab Results   Component Value Date/Time    TROPONINI <0.01 01/14/2020 05:31 AM    TROPONINI <0.01 01/13/2020 02:56 PM    TROPONINI <0.01 01/13/2020 07:38 AM     TSH:    Lab Results   Component Value Date/Time    TSH 1.320 10/09/2020 10:33 AM     VITAMIN B12:   Lab Results   Component Value Date/Time    ULXIETZI04 309 10/09/2020 10:33 AM     FOLATE:  No results found for: \"FOLATE\"  IRON:    Lab Results   Component Value

## 2025-05-06 NOTE — PLAN OF CARE
Problem: Chronic Conditions and Co-morbidities  Goal: Patient's chronic conditions and co-morbidity symptoms are monitored and maintained or improved  Outcome: Progressing  Flowsheets  Taken 5/5/2025 2000 by Yanna Hendrickson RN  Care Plan - Patient's Chronic Conditions and Co-Morbidity Symptoms are Monitored and Maintained or Improved: Monitor and assess patient's chronic conditions and comorbid symptoms for stability, deterioration, or improvement  Taken 5/5/2025 0800 by Minerva Ni RN  Care Plan - Patient's Chronic Conditions and Co-Morbidity Symptoms are Monitored and Maintained or Improved:   Monitor and assess patient's chronic conditions and comorbid symptoms for stability, deterioration, or improvement   Collaborate with multidisciplinary team to address chronic and comorbid conditions and prevent exacerbation or deterioration   Update acute care plan with appropriate goals if chronic or comorbid symptoms are exacerbated and prevent overall improvement and discharge     Problem: Pain  Goal: Verbalizes/displays adequate comfort level or baseline comfort level  Outcome: Progressing     Problem: Safety - Adult  Goal: Free from fall injury  Outcome: Progressing  Flowsheets (Taken 5/5/2025 2000)  Free From Fall Injury: Instruct family/caregiver on patient safety     Problem: ABCDS Injury Assessment  Goal: Absence of physical injury  Outcome: Progressing  Flowsheets (Taken 5/5/2025 2000)  Absence of Physical Injury: Implement safety measures based on patient assessment     Problem: Skin/Tissue Integrity  Goal: Skin integrity remains intact  Description: 1.  Monitor for areas of redness and/or skin breakdown2.  Assess vascular access sites hourly3.  Every 4-6 hours minimum:  Change oxygen saturation probe site4.  Every 4-6 hours:  If on nasal continuous positive airway pressure, respiratory therapy assess nares and determine need for appliance change or resting period  Outcome:

## 2025-05-06 NOTE — DISCHARGE SUMMARY
nafcillin IV 2 g every 4 hour.  Patient required removal of CRT-D for that reason transfer to [facility].  Discharge Exam:    General Appearance: alert and oriented to person, place and time and in no acute distress  Skin: warm and dry  Head: normocephalic and atraumatic  Eyes: pupils equal, round, and reactive to light, extraocular eye movements intact, conjunctivae normal  Neck: neck supple and non tender without mass   Pulmonary/Chest: clear to auscultation bilaterally- no wheezes, rales or rhonchi, normal air movement, no respiratory distress  Cardiovascular: normal rate, normal S1 and S2 and no carotid bruits  Abdomen: soft, non-tender, non-distended, normal bowel sounds, no masses or organomegaly  Extremities: no cyanosis, no clubbing and no edema  Neurologic: no cranial nerve deficit and speech normal    I/O last 3 completed shifts:  In: 5495 [I.V.:3276.2; IV Piggyback:2218.8]  Out: 2600 [Urine:2600]  I/O this shift:  In: 2168.2 [I.V.:1541.5; IV Piggyback:626.6]  Out: 345 [Urine:345]      LABS:  Recent Labs     05/05/25  0515 05/05/25  1840 05/06/25  0453    136 137   K 3.5 2.8* 2.9*    103 103   CO2 16* 19* 18*   BUN 46* 46* 40*   CREATININE 1.7* 1.5* 1.4*   GLUCOSE 153* 185* 138*   CALCIUM 6.4* 6.7* 6.7*       Recent Labs     05/05/25  0515 05/06/25  0453 05/06/25  0926   WBC 25.5* 21.6* 21.1*   RBC 4.36 3.74 3.87   HGB 12.6 10.7* 11.1*   HCT 33.4* 28.8* 30.4*   MCV 76.6* 77.0* 77.7*   MCH 28.9 28.6 28.4   MCHC 37.7* 37.2* 36.5*   RDW 15.2* 15.9* 15.9*   PLT 75*  --  59*   MPV Can not be calculated 12.7* 12.3*       Recent Labs     05/04/25  0551   POCGLU 250*       Imaging:  No results found.    Patient Instructions:      Medication List        START taking these medications      nafcillin infusion  Infuse 2,000 mg intravenously every 4 hours for 2 days Compound per protocol            CONTINUE taking these medications      atorvastatin 20 MG tablet  Commonly known as: LIPITOR

## 2025-05-07 LAB — PATH REV BLD -IMP: NORMAL

## 2025-05-08 LAB
MICROORGANISM SPEC CULT: NORMAL
MICROORGANISM SPEC CULT: NORMAL
SERVICE CMNT-IMP: NORMAL
SERVICE CMNT-IMP: NORMAL
SPECIMEN DESCRIPTION: NORMAL
SPECIMEN DESCRIPTION: NORMAL

## 2025-05-09 NOTE — PROGRESS NOTES
[facility]             Pulmonary & Critical Care Medicine                MICU Progress Note                 Written by: Vandana Robertson MD  Name: Vani Cheng : 1960       Age: 64 y.o. MR/Act #    : [id number],  Billing  #    : [id number]   Admit Date: 2025 12:13 AM LOS: 3,   Hospital Day: 4 Room #      : 4405/4405-A   PCP            : Tawana Guzman MD,   Referred by: Kashmir Lewis MD ICU Attending: MD Otoniel Armenta date: 2025 9:24 AM   ICU Days:       3 Vent Days:    0 LOS: 3,                          Reason for ICU admission           Chief Complaint   Patient presents with    Fatigue     Patient state she has had increased weakness and tired since Monday. With generalized body aches states that she gets tired with daily activity                           Brief HPI, Presentation & Synopsis                       64-year-old female with past medical history of nonischemic cardiomyopathy,, HFrEF with EF of 25%, status post CRT-D with pacing 99% of the time, left bundle branch block presented with weakness and joint pain.  She describes that joint pains are diffuse all over the body and is started about a week ago.  No specific point tenderness in any joint.  She is found to be in septic shock with lactic acidosis on presentation.  Levophed drip was started.  Blood cultures are positive for 4 out of 4 gram-positive cocci in clusters.  Admitted in MICU for septic shock.     Active problem list of yesterday:  Active Hospital Problems    Diagnosis Date Noted    Shock (HCC) [R57.9] 2025    Bacteremia [R78.81] 2025    Presence of cardiac resynchronization therapy defibrillator (CRT-D) [Z95.810] 2025                           Events of last night                      Off of vasopressors                      Subjective   2025               Awake and alert today   She is NPO for CIERA  Vasopressors have been off                   
       [facility]             Pulmonary & Critical Care Medicine                MICU Progress Note                 Written by: Sky Keene MD  Name: Vani Cheng : 1960       Age: 64 y.o. MR/Act #    : [id number],  Billing  #    : [id number]   Admit Date: 2025 12:13 AM LOS: 1,   Hospital Day: 2 Room #      : 4405/4405-A   PCP            : Tawana Guzman MD,   Referred by: Dustin Mix MD ICU Attending: MD Otoniel Schuler date: 5/3/2025 10:44 AM   ICU Days:       2 Vent Days:    0 LOS: 1,                          Reason for ICU admission           Chief Complaint   Patient presents with    Fatigue     Patient state she has had increased weakness and tired since Monday. With generalized body aches states that she gets tired with daily activity                           Brief HPI, Presentation & Synopsis                       64-year-old female with past medical history of nonischemic cardiomyopathy,, HFrEF with EF of 25%, status post CRT-D with pacing 99% of the time, left bundle branch block presented with weakness and joint pain.  She describes that joint pains are diffuse all over the body and is started about a week ago.  No specific point tenderness in any joint.  She is found to be in septic shock with lactic acidosis on presentation.  Levophed drip was started.  Blood cultures are positive for 4 out of 4 gram-positive cocci in clusters.  Admitted in MICU for septic shock.     Active problem list of yesterday:  Active Hospital Problems    Diagnosis Date Noted    Shock (HCC) [R57.9] 2025    Bacteremia [R78.81] 2025    Presence of cardiac resynchronization therapy defibrillator (CRT-D) [Z95.810] 2025                           Events of last night                      Continues to be pressor dependent                      Subjective   5/3/2025               Afebrile                      Objective  5/3/2025               Vitals:    
       [facility] Hospitalist Progress Note    Admitting Date and Time: 5/2/2025 12:13 AM  Admit Dx: Hypokalemia [E87.6]  Shock (HCC) [R57.9]  Thrombocytopenia [D69.6]  ROSELYN (acute kidney injury) [N17.9]  Septic shock (HCC) [A41.9, R65.21]    Subjective:  Patient is being followed for Hypokalemia [E87.6]  Shock (HCC) [R57.9]  Thrombocytopenia [D69.6]  ROSELYN (acute kidney injury) [N17.9]  Septic shock (HCC) [A41.9, R65.21]   Pt feels okay, denies any shortness of breath or chest pain.      ROS: denies fever, chills, cp, sob, n/v, HA unless stated above.      hydrocortisone sodium succinate PF  25 mg IntraVENous Q12H    atorvastatin  20 mg Oral Daily    sodium chloride flush  5-40 mL IntraVENous 2 times per day    [Held by provider] enoxaparin  30 mg SubCUTAneous Daily    melatonin  3 mg Oral Nightly    polyethylene glycol  17 g Oral Daily    sennosides-docusate sodium  1 tablet Oral BID    pantoprazole (PROTONIX) 40 mg in sodium chloride (PF) 0.9 % 10 mL injection  40 mg IntraVENous Daily    nafcillin  2,000 mg IntraVENous Q4H     oxyCODONE, 5 mg, Q6H PRN  sodium chloride flush, 5-40 mL, PRN  sodium chloride, , PRN  ondansetron, 4 mg, Q8H PRN   Or  ondansetron, 4 mg, Q6H PRN  magnesium hydroxide, 30 mL, Daily PRN  bisacodyl, 10 mg, Daily PRN  aluminum & magnesium hydroxide-simethicone, 30 mL, Q6H PRN  acetaminophen, 650 mg, Q6H PRN   Or  acetaminophen, 650 mg, Q6H PRN  HYDROcodone-acetaminophen, 1 tablet, Q4H PRN         Objective:    /72   Pulse (!) 102   Temp 98.4 °F (36.9 °C) (Temporal)   Resp 27   Ht 1.6 m (5' 2.99\")   Wt 73 kg (160 lb 15 oz)   SpO2 98%   BMI 29.44 kg/m²     General Appearance: alert and oriented to person, place and time and in no acute distress  Skin: warm and dry  Head: normocephalic and atraumatic  Eyes: pupils equal, round, and reactive to light, extraocular eye movements intact, conjunctivae normal  Neck: neck supple and non tender without mass   Pulmonary/Chest: clear to 
      HOSPITALIST PROGRESS NOTE    Patient's name: Vani Cheng  : 1960  Admission date: 2025  Date of service: 2025   Primary care physician: Tawana Guzman MD    Assessment   Patient admitted on 2025     Vani Cheng is a 64 y.o. female patient of Tawana Guzman MD with history of nonischemic cardiomyopathy, ICD, left bundle branch block, CHFrEF, GERD presented to [facility] on 2025 with concern of fatigue and tiredness.  While in the ER patient found to have hypotension with blood pressures remaining in 70s after IV fluid resuscitation.  Patient had lab workup showing ROSELYN, hypokalemia, lactic acidosis.  Patient chest x-ray showed no obvious pneumonia, CT abdomen was unremarkable.  As patient blood pressure remained low patient was admitted for concern of septic shock to the ICU.     Septic shock:  MSSA bacteremia  Concern for ICD infection  UA and urine culture pending  Blood culture growing MSSA, repeat blood cultures no growth  Continue nafcillin, ID consulted.  Off hypertension meds  Remains in icu    New onset right-sided weakness  Patient with new onset right upper and lower extremity weakness with no acute speech abnormality.  CT brain showed small indeterminate low-density in the inferior left frontal lobe that may represent old encephalomalacia.  MRI brain negative for stroke      ROSELYN  Hypokalemia-resolved  Patient baseline creatinine is 1.8, 1.7 today  setting of shock  On saline @100cc/hr  Given underlying chf watch for volume overload  Monitor renal function  Avoid nephrotoxins  Nephrology is on board     Thrombocytopenia likely in the setting of acute illness  Monitor closely, platelet count is 75k     HFrEF  NICM s/p ICD  Holding home Bumex, Entresto, Aldactone, metoprolol in the setting of shock, resume once able.    Transaminitis    Hypocalcemia  Nephrology following        Follow labs   DVT prophylaxis SCD's or Sequential Compression Device  Please see 
      HOSPITALIST PROGRESS NOTE    Patient's name: Vani Cheng  : 1960  Admission date: 2025  Date of service: 2025   Primary care physician: Tawana Guzman MD    Assessment   Patient admitted on 2025     Vani Cheng is a 64 y.o. female patient of Tawana Guzman MD with history of nonischemic cardiomyopathy, ICD, left bundle branch block, CHFrEF, GERD presented to [facility] on 2025 with concern of fatigue and tiredness.  While in the ER patient found to have hypotension with blood pressures remaining in 70s after IV fluid resuscitation.  Patient had lab workup showing ROSELYN, hypokalemia, lactic acidosis.  Patient chest x-ray showed no obvious pneumonia, CT abdomen was unremarkable.  As patient blood pressure remained low patient was admitted for concern of septic shock to the ICU.     Septic shock:  MSSA bacteremia  Concern for ICD infection  UA and urine culture pending  Blood culture growing MSSA, repeat blood cultures no growth  Continue nafcillin, ID consulted.  Is off Levophed   ICU consult  Holding antihypertensives    New onset right-sided weakness  Patient with new onset right upper and lower extremity weakness with no acute speech abnormality.  CT brain showed small indeterminate low-density in the inferior left frontal lobe that may represent old encephalomalacia.  MRI brain ordered  Consider neurology consult     ROSELYN  Hypokalemia-resolved  Patient baseline creatinine is 1.8, creatinine to 2.4 on admission likely in the setting of shock  Received IV fluids  Monitor renal function  Avoid nephrotoxins  Nephrology is on board     Thrombocytopenia likely in the setting of acute illness  Monitor closely, platelet count is 46,000.     HFrEF  NICM s/p ICD  Holding home Bumex, Entresto, Aldactone, metoprolol in the setting of shock, resume once able.      Follow labs   DVT prophylaxis SCD's or Sequential Compression Device  Please see orders for further management and 
  [facility]  [facility]  Progress Note    SUBJECTIVE:  Chief Complaint   Patient presents with    Fatigue     Patient state she has had increased weakness and tired since Monday. With generalized body aches states that she gets tired with daily activity        Patient resting in bed. Denies fever or chills. No nausea, vomiting, rash or diarrhea      Review of systems:  As stated above in the chief complaint, otherwise negative.    Medications:  Scheduled Meds:   albumin human 25%  25 g IntraVENous Q6H    midodrine  10 mg Oral TID WC    hydrocortisone sodium succinate PF  100 mg IntraVENous Q8H    atorvastatin  20 mg Oral Daily    sodium chloride flush  5-40 mL IntraVENous 2 times per day    [Held by provider] enoxaparin  30 mg SubCUTAneous Daily    melatonin  3 mg Oral Nightly    polyethylene glycol  17 g Oral Daily    sennosides-docusate sodium  1 tablet Oral BID    pantoprazole (PROTONIX) 40 mg in sodium chloride (PF) 0.9 % 10 mL injection  40 mg IntraVENous Daily    oxyCODONE  5 mg Oral Q6H    nafcillin  2,000 mg IntraVENous Q4H     Continuous Infusions:   sodium bicarbonate 150 mEq in dextrose 5 % 1,000 mL infusion      norepinephrine 10 mcg/min (25 0607)    sodium chloride       PRN Meds:sodium chloride flush, sodium chloride, ondansetron **OR** ondansetron, magnesium hydroxide, bisacodyl, aluminum & magnesium hydroxide-simethicone, acetaminophen **OR** acetaminophen, HYDROcodone-acetaminophen    OBJECTIVE:  BP (!) 81/49   Pulse 80   Temp 98.3 °F (36.8 °C) (Oral)   Resp 13   Ht 1.6 m (5' 2.99\")   Wt 54.3 kg (119 lb 11.4 oz)   SpO2 95%   BMI 21.21 kg/m²   Temp  Av.2 °F (36.8 °C)  Min: 97.9 °F (36.6 °C)  Max: 98.3 °F (36.8 °C)    Constitutional: NAD  Skin: Warm and dry. No rashes were noted.   Neuro: Alert and oriented  HEENT: Round and reactive pupils.  Moist mucous membranes.  No ulcerations or thrush.  Chest: .Respirations unlabored. Breath sounds clear. 
  OCCUPATIONAL THERAPY INITIAL EVALUATION    [facility]  [address], [place]       Date:2025                                                               Patient Name: Vani Cheng  MRN: [id number]  : 1960  Room: [address] [place] [address]    Evaluating OT: Martha Mauricio, OTD,  OTR/L; [id number]    Referring Provider: Vandana Robertson MD    Specific Provider Orders/Date: OT eval and treat (25)       Diagnosis: Hypokalemia [E87.6]  Shock ([facility]) [R57.9]  Thrombocytopenia [D69.6]  ROSELYN (acute kidney injury) [N17.9]  Septic shock ([facility]) [A41.9, R65.21]     Reason for admission: Pt admitted with shock, ROSELYN, hx pacer.    Surgery/Procedures: None this admission     Pertinent Medical History:    Past Medical History:   Diagnosis Date    CHF (congestive heart failure) ([facility])     Esophageal stricture     Patient reports testing with Dr. Beltran in [place]    HFrEF (heart failure with reduced ejection fraction) ([facility]) 2020- echo- LVEF 15%, stage I DD, RVSF reduced, LA severely dilated, LBBB, severe MR, mild TR, :LVDD: 6.5, RVDD: 3.0    LBBB (left bundle branch block)     Nonischemic cardiomyopathy ([facility])         *Precautions:  Fall Risk    Assessment of current deficits   [x] Functional mobility  [x]ADLs  [x] Strength               []Cognition   [x] Functional transfers   [x] IADLs         [x] Safety Awareness   [x]Endurance   [] Fine Coordination        [x] ROM     [] Vision/perception   []Sensation    []Gross Motor Coordination [x] Balance   [] Delirium                  []Motor Control     [] Communication    OT PLAN OF CARE   OT POC based on physician orders, patient diagnosis and results of clinical assessment.       Frequency/Duration: 1-3 days/wk for 1-2 weeks PRN    Specific OT Treatment Interventions to include:   * Instruction/training on adapted ADL techniques and AE recommendations to increase functional independence within precautions  
  Physician Progress Note      PATIENT:               SHARA LOPEZ  CSN #:                  [id number]  :                       1960  ADMIT DATE:       2025 12:13 AM  DISCH DATE:        2025 4:20 PM  RESPONDING  PROVIDER #:        Clayton Nelson MD          QUERY TEXT:    ROSELYN is documented in the H&P ( Dr Dustin Mix).  Please provide   additional clinical indicators supportive of your documentation. Or please   document if the diagnosis of ROSELYN has been ruled out after study.    The clinical indicators include:  - \"ROSELYN\" (H&P  Dr Dustin Mix)  - \"baseline creatinine is 1.8\" (H&P  Dr Dustin Mix)  - Creatinine: highest is 2.4  (Labs)  - Treatment: Received IV fluids, monitor renal function, avoid nephrotoxins   (H&P  Dr Dustin Mix)  Options provided:  -- Acute kidney injury evidenced by, Please document evidence as well as a   numerical baseline creatinine, if known.  -- Acute kidney injury ruled out after study  -- Other - I will add my own diagnosis  -- Disagree - Not applicable / Not valid  -- Disagree - Clinically unable to determine / Unknown  -- Refer to Clinical Documentation Reviewer    PROVIDER RESPONSE TEXT:    This patient has an acute kidney injury as evidenced by improved by ivf from   2.3 to 1.4.    Query created by: Drew Hermosillo on 2025 1:55 PM      Electronically signed by:  Clayton Nelson MD 2025 4:05 PM          
  [facility]  [facility]  Progress Note    SUBJECTIVE:  Chief Complaint   Patient presents with    Fatigue     Patient state she has had increased weakness and tired since Monday. With generalized body aches states that she gets tired with daily activity        Patient resting in bed. Denies fever or chills. No nausea, vomiting, rash or diarrhea      Review of systems:  As stated above in the chief complaint, otherwise negative.    Medications:  Scheduled Meds:   hydrocortisone sodium succinate PF  25 mg IntraVENous Q12H    atorvastatin  20 mg Oral Daily    sodium chloride flush  5-40 mL IntraVENous 2 times per day    [Held by provider] enoxaparin  30 mg SubCUTAneous Daily    melatonin  3 mg Oral Nightly    polyethylene glycol  17 g Oral Daily    sennosides-docusate sodium  1 tablet Oral BID    pantoprazole (PROTONIX) 40 mg in sodium chloride (PF) 0.9 % 10 mL injection  40 mg IntraVENous Daily    nafcillin  2,000 mg IntraVENous Q4H     Continuous Infusions:   sodium chloride       PRN Meds:oxyCODONE, sodium chloride flush, sodium chloride, ondansetron **OR** ondansetron, magnesium hydroxide, bisacodyl, aluminum & magnesium hydroxide-simethicone, acetaminophen **OR** acetaminophen, HYDROcodone-acetaminophen    OBJECTIVE:  /76   Pulse (!) 107   Temp 98.4 °F (36.9 °C) (Temporal)   Resp 23   Ht 1.6 m (5' 2.99\")   Wt 73 kg (160 lb 15 oz)   SpO2 98%   BMI 29.44 kg/m²   Temp  Av.3 °F (36.8 °C)  Min: 97.7 °F (36.5 °C)  Max: 98.7 °F (37.1 °C)    Constitutional: NAD  Skin: Warm and dry. No rashes were noted.   Neuro: Alert and oriented. Forgetful of recent discussions.   HEENT: Round and reactive pupils.  Moist mucous membranes.  No ulcerations or thrush.  Chest: .Respirations unlabored. Breath sounds clear.   Cardiovascular:  RRR  Abdomen: Soft. Bowel sounds present. Sotomayor   Extremities: LE edema 3+    Lines: PIV       Laboratory and Tests:  Lab Results   Component Value Date 
  [facility]  [facility]  Progress Note    SUBJECTIVE:  Chief Complaint   Patient presents with    Fatigue     Patient state she has had increased weakness and tired since Monday. With generalized body aches states that she gets tired with daily activity        Patient resting in bed. Denies fever or chills. No nausea, vomiting, rash or diarrhea      Review of systems:  As stated above in the chief complaint, otherwise negative.    Medications:  Scheduled Meds:   hydrocortisone sodium succinate PF  50 mg IntraVENous Q8H    midodrine  5 mg Oral TID WC    calcium gluconate  2,000 mg IntraVENous Once    atorvastatin  20 mg Oral Daily    sodium chloride flush  5-40 mL IntraVENous 2 times per day    [Held by provider] enoxaparin  30 mg SubCUTAneous Daily    melatonin  3 mg Oral Nightly    polyethylene glycol  17 g Oral Daily    sennosides-docusate sodium  1 tablet Oral BID    pantoprazole (PROTONIX) 40 mg in sodium chloride (PF) 0.9 % 10 mL injection  40 mg IntraVENous Daily    oxyCODONE  5 mg Oral Q6H    nafcillin  2,000 mg IntraVENous Q4H     Continuous Infusions:   lactated ringers 75 mL/hr at 25 1216    sodium chloride       PRN Meds:sodium chloride flush, sodium chloride, ondansetron **OR** ondansetron, magnesium hydroxide, bisacodyl, aluminum & magnesium hydroxide-simethicone, acetaminophen **OR** acetaminophen, HYDROcodone-acetaminophen    OBJECTIVE:  /62   Pulse 88   Temp 97.8 °F (36.6 °C) (Oral)   Resp 21   Ht 1.6 m (5' 2.99\")   Wt 73.1 kg (161 lb 2.5 oz)   SpO2 96%   BMI 29.48 kg/m²   Temp  Av.3 °F (36.8 °C)  Min: 97.8 °F (36.6 °C)  Max: 98.8 °F (37.1 °C)    Constitutional: NAD  Skin: Warm and dry. No rashes were noted.   Neuro: Alert and oriented. Forgetful of recent discussions.   HEENT: Round and reactive pupils.  Moist mucous membranes.  No ulcerations or thrush.  Chest: .Respirations unlabored. Breath sounds clear.   Cardiovascular:  RRR  Abdomen: Soft. 
  [facility]  [facility]  Progress Note    SUBJECTIVE:  Chief Complaint   Patient presents with    Fatigue     Patient state she has had increased weakness and tired since Monday. With generalized body aches states that she gets tired with daily activity        Patient resting in bed. Denies fever or chills. No nausea, vomiting, rash or diarrhea      Review of systems:  As stated above in the chief complaint, otherwise negative.    Medications:  Scheduled Meds:   calcium gluconate  2,000 mg IntraVENous Once    potassium chloride  10 mEq IntraVENous Q2H    midodrine  10 mg Oral TID WC    hydrocortisone sodium succinate PF  100 mg IntraVENous Q8H    atorvastatin  20 mg Oral Daily    sodium chloride flush  5-40 mL IntraVENous 2 times per day    [Held by provider] enoxaparin  30 mg SubCUTAneous Daily    melatonin  3 mg Oral Nightly    polyethylene glycol  17 g Oral Daily    sennosides-docusate sodium  1 tablet Oral BID    pantoprazole (PROTONIX) 40 mg in sodium chloride (PF) 0.9 % 10 mL injection  40 mg IntraVENous Daily    oxyCODONE  5 mg Oral Q6H    nafcillin  2,000 mg IntraVENous Q4H     Continuous Infusions:   sodium bicarbonate 150 mEq in dextrose 5 % 1,000 mL infusion 150 mL/hr at 25 0427    norepinephrine Stopped (25 0653)    sodium chloride       PRN Meds:sodium chloride flush, sodium chloride, ondansetron **OR** ondansetron, magnesium hydroxide, bisacodyl, aluminum & magnesium hydroxide-simethicone, acetaminophen **OR** acetaminophen, HYDROcodone-acetaminophen    OBJECTIVE:  BP (!) 81/50   Pulse 79   Temp 97.8 °F (36.6 °C) (Oral)   Resp 15   Ht 1.6 m (5' 2.99\")   Wt 72.8 kg (160 lb 7.9 oz)   SpO2 95%   BMI 29.35 kg/m²   Temp  Av.7 °F (36.5 °C)  Min: 97.5 °F (36.4 °C)  Max: 97.8 °F (36.6 °C)    Constitutional: NAD  Skin: Warm and dry. No rashes were noted.   Neuro: Alert and oriented. Forgetful of recent discussions.   HEENT: Round and reactive pupils.  Moist 
4 Eyes Skin Assessment     NAME:  Vani Cheng  YOB: 1960  MEDICAL RECORD NUMBER:  [id number]    The patient is being assessed for  Admission    I agree that at least one RN has performed a thorough Head to Toe Skin Assessment on the patient. ALL assessment sites listed below have been assessed.      Areas assessed by both nurses:    Head, Face, Ears, Shoulders, Back, Chest, Arms, Elbows, Hands, Sacrum. Buttock, Coccyx, Ischium, Legs. Feet and Heels, and Under Medical Devices         Does the Patient have a Wound? No noted wound(s)       Srikanth Prevention initiated by RN: No  Wound Care Orders initiated by RN: No    Pressure Injury (Stage 3,4, Unstageable, DTI, NWPT, and Complex wounds) if present, place Wound referral order by RN under : No    New Ostomies, if present place, Ostomy referral order under : No     Nurse 1 eSignature: Electronically signed by Luna Ugalde RN on 5/2/25 at 9:16 AM EDT    **SHARE this note so that the co-signing nurse can place an eSignature**    Nurse 2 eSignature: Electronically signed by Gabriela Foy RN on 5/2/25 at 10:05 AM EDT  
[facility]  MD MARCELA Pace MD .  Progress note  Patient's Name: Vani Cheng  12:34 PM  5/5/2025 5/4: Azotemia around the same  More or less euvolemic  On room air  Blood pressure stable    5/5: Seen while in the ICU. She is in no acute distress. Denies any dyspnea. Her only complaint is difficulty swallowing at times. She is having ongoing diarrhea. She is NPO for CIERA today. No longer on pressors.     History of Present Ilness:      64-year-old female presenting with a chief complaint of being fatigued  She is being found to have MSSA bacteremia  She has a history of nonischemic cardiomyopathy EF 25% ICD left bundle branch block among other medical problem  Nephrology asked to see for ROSELYN  Baseline creatinine 0.7 she presented with a creatinine of 2.3  She is seen in her room she is a pleasant female she is on O2 via nasal cannula she appears euvolemic  She is on neoepinephrine she is on nafcillin and she is supposed to be on bicarb drip  She reports to me no nausea no vomiting no diarrhea no chest pain no breathing problem    Past Medical History:   Diagnosis Date    CHF (congestive heart failure) ([facility])     HFrEF (heart failure with reduced ejection fraction) ([facility]) 01/17/2020 1/14/20- echo- LVEF 15%, stage I DD, RVSF reduced, LA severely dilated, LBBB, severe MR, mild TR, :LVDD: 6.5, RVDD: 3.0    LBBB (left bundle branch block)     Nonischemic cardiomyopathy ([facility])        Past Surgical History:   Procedure Laterality Date    CARDIAC CATHETERIZATION  01/16/2020    DR Eagle    CARDIAC DEFIBRILLATOR PLACEMENT  12/18/2020    BIV ICD    ([employer])    DR. BELTRAN    COLONOSCOPY         Family History   Problem Relation Age of Onset    Stroke Mother     No Known Problems Sister     No Known Problems Sister     No Known Problems Sister         reports that she has never smoked. She has never used smokeless tobacco. She reports that she does not currently use alcohol. She 
[facility]  MD MARCELA Pace MD .  Progress note  Patient's Name: Vani Cheng  12:36 PM  5/6/2025 5/4: Azotemia around the same  More or less euvolemic  On room air  Blood pressure stable    5/5: Seen while in the ICU. She is in no acute distress. Denies any dyspnea. Her only complaint is difficulty swallowing at times. She is having ongoing diarrhea. She is NPO for CIERA today. No longer on pressors.     5/6: Seen while sitting up in the chair, in the ICU. She is in no acute distress. She is on room air and denies any dyspnea. She did not have an loose bowel movements last night, though had a \"large\" loose bowel movement this morning. PO intake remains poor secondary to the esophageal stricture. She is drinking some fluids. Complains of a dry mouth. Blood pressure is stable off pressors.     History of Present Ilness:      64-year-old female presenting with a chief complaint of being fatigued  She is being found to have MSSA bacteremia  She has a history of nonischemic cardiomyopathy EF 25% ICD left bundle branch block among other medical problem  Nephrology asked to see for ROSELYN  Baseline creatinine 0.7 she presented with a creatinine of 2.3  She is seen in her room she is a pleasant female she is on O2 via nasal cannula she appears euvolemic  She is on neoepinephrine she is on nafcillin and she is supposed to be on bicarb drip  She reports to me no nausea no vomiting no diarrhea no chest pain no breathing problem    Past Medical History:   Diagnosis Date    CHF (congestive heart failure) (Formerly [facility])     Esophageal stricture     Patient reports testing with Dr. Beltran in [place]    HFrEF (heart failure with reduced ejection fraction) (Formerly [facility]) 01/17/2020 1/14/20- echo- LVEF 15%, stage I DD, RVSF reduced, LA severely dilated, LBBB, severe MR, mild TR, :LVDD: 6.5, RVDD: 3.0    LBBB (left bundle branch block)     Nonischemic cardiomyopathy (Formerly [facility])        Past Surgical History: 
[facility]  MD MARCELA Pace MD .  Progress note  Patient's Name: Vani Cheng  1:46 PM  5/4/2025 5/4: Azotemia around the same  More or less euvolemic  On room air  Blood pressure stable      History of Present Ilness:      64-year-old female presenting with a chief complaint of being fatigued  She is being found to have MSSA bacteremia  She has a history of nonischemic cardiomyopathy EF 25% ICD left bundle branch block among other medical problem  Nephrology asked to see for ROSELYN  Baseline creatinine 0.7 she presented with a creatinine of 2.3  She is seen in her room she is a pleasant female she is on O2 via nasal cannula she appears euvolemic  She is on neoepinephrine she is on nafcillin and she is supposed to be on bicarb drip  She reports to me no nausea no vomiting no diarrhea no chest pain no breathing problem    Past Medical History:   Diagnosis Date    CHF (congestive heart failure) ([facility])     HFrEF (heart failure with reduced ejection fraction) ([facility]) 01/17/2020 1/14/20- echo- LVEF 15%, stage I DD, RVSF reduced, LA severely dilated, LBBB, severe MR, mild TR, :LVDD: 6.5, RVDD: 3.0    LBBB (left bundle branch block)     Nonischemic cardiomyopathy ([facility])        Past Surgical History:   Procedure Laterality Date    CARDIAC CATHETERIZATION  01/16/2020    DR Eagle    CARDIAC DEFIBRILLATOR PLACEMENT  12/18/2020    BIV ICD    ([employer])    DR. BELTRAN    COLONOSCOPY         Family History   Problem Relation Age of Onset    Stroke Mother     No Known Problems Sister     No Known Problems Sister     No Known Problems Sister         reports that she has never smoked. She has never used smokeless tobacco. She reports that she does not currently use alcohol. She reports that she does not use drugs.    Allergies:  Patient has no known allergies.    Current Medications:    potassium chloride 10 mEq/100 mL IVPB (Peripheral Line), Q2H  potassium phosphate 30 mmol in sodium 
Nicom performed, 500 ml bolus given. Patient is fluid responsive, SVI 46.5%  
Patient accepted to [facility] J-53 bed 20. Accepting physician Dr. Silvino Bledsoe. Transportation available in 30 minutes. Nurse to nurse, Melvin called. Imaging has been pushed. Physicians and consults have been updated. Patient has been updated and all documentation has been prepared.   
Patient admitted to MICU with the following belongings: Jewelry, Watch, Wallet, Purse, Cell Phone, Pants, Shirt, Socks, and Shoes. The following belongings were sent home with the patient's family:  None - belongings noted on admission remain in patient's room..  
Patient complained of 10/10 pain in right arm after IV nafcillin infusion started.  Infusion immediately stopped.  IV site inspected..  Blood return noted.  No swelling/redness at site.  Iv site flushed with NSS.  Arm elevated and ice placed.  Norco given for pain.  Will continue to monitor arm and pain level.  Patient stated some relief from measures.  
Patient has refused turning multiple times through this shift. Patient educated by this RN on the importance of turning and changing positions. Patient pulled up and repositioned in bed, but continues to lay supine at this time.  
Pharmacy Consultation Note  (Antibiotic Dosing and Monitoring)    Initial consult date: 5/2/25  Consulting physician/provider: Dr. SILVERIO Mix  Drug: Vancomycin  Indication: sepsis 2/2 unknown etiology; 7 days    Vancomycin has been discontinued   Clinical Pharmacy to sign-off  Physician to re-consult pharmacy if future dosing is needed    Thank you for the consult,    Venancio Mahoney, Andrade, BCPS, BCCCP 5/2/2025 4:02 PM  Phone: [phone]  
Physical Therapy    Physical Therapy Initial Assessment     Name: Vani Cheng  : 1960  MRN: [id number]      Date of Service: 2025    Evaluating PT:  Art Barboza, PT, DPT  [id number]     Room #:  7855/4405-A  Diagnosis:  Hypokalemia [E87.6]  Shock (HCC) [R57.9]  Thrombocytopenia [D69.6]  ROSELYN (acute kidney injury) [N17.9]  Septic shock (HCC) [A41.9, R65.21]  PMHx/PSHx:   has a past medical history of CHF (congestive heart failure) (HCC), Esophageal stricture, HFrEF (heart failure with reduced ejection fraction) (HCC), LBBB (left bundle branch block), and Nonischemic cardiomyopathy (HCC).   Procedure/Surgery:  None   Precautions:  Falls, Monitor HR   Equipment Needs:  TBD    SUBJECTIVE:    Pt lives alone in a 3rd floor apartment with 2 stairs and 1 rail to enter.  Bedroom and bathroom are on the 3rd level.  Pt ambulated with no AD, independent, drives, works as a caregiver PTA.    OBJECTIVE:   Initial Evaluation  Date: 25 Treatment Short Term/ Long Term   Goals   AM-PAC 6 Clicks 15/24     Was pt agreeable to Eval/treatment? Yes      Does pt have pain? No c/o pain, reports generalized \"heaviness\"     Bed Mobility  Rolling: NT  Supine to sit: Min A  Sit to supine: NT  Scooting: Min A  Rolling: Independent   Supine to sit: Independent   Sit to supine: Independent   Scooting: Independent    Transfers Sit to stand: Min A from bed; Mod A from chair   Stand to sit: Min A  Stand pivot: Min A with no AD   Sit to stand: Independent   Stand to sit: Independent   Stand pivot: Independent    Ambulation    200 feet with FWW Min A  >400 feet with AAD if needed Modified Independent     Stair negotiation: ascended and descended  NT  >4 steps with 1 rail Modified Independent     ROM BUE:  Per OT eval   BLE:  WFL     Strength BUE:  Per OT eval   BLE:  WFL     Balance Sitting EOB:  SBA  Dynamic Standing:  Min A with FWW  Sitting EOB:  Independent   Dynamic Standing:  Modified Independent with AAD if needed      Pt is A & O x 
Renal Dose Adjustment Policy (Generic)     This patient is on medication that requires renal, weight, and/or indication dose adjustment.      Date Body Weight IBW  Adjusted BW SCr  CrCl Dialysis status   5/2/2025 54.4 kg (120 lb) Ideal body weight: 52.4 kg (115 lb 8.3 oz)  Adjusted ideal body weight: 53.2 kg (117 lb 5 oz) Serum creatinine: 2.4 mg/dL (H) 05/02/25 0754  Estimated creatinine clearance: 20 mL/min (A) N/a       Pharmacy has dose-adjusted the following medication(s):    Date Previous Order Adjusted Order   5/2/2025 Pip-tazo 3375 mg q8h Pip-tazo 4500 mg q8h       These changes were made per protocol according to the [facility]   Automatic Renal Dose Adjustment Policy.     *Please note this dose may need readjusted if patient's condition changes.    Please contact pharmacy with any questions regarding these changes.    Casi Morrow [facility]  5/2/2025  9:54 AM    
IntraVENous PRN Dustin Mix MD        [Held by provider] enoxaparin Sodium (LOVENOX) injection 30 mg  30 mg SubCUTAneous Daily Dustin Mix MD        ondansetron (ZOFRAN-ODT) disintegrating tablet 4 mg  4 mg Oral Q8H PRN Dustin Mix MD        Or    ondansetron (ZOFRAN) injection 4 mg  4 mg IntraVENous Q6H PRN Dustin Mix MD        melatonin tablet 3 mg  3 mg Oral Nightly Dustin Mix MD   3 mg at 05/02/25 2015    polyethylene glycol (GLYCOLAX) packet 17 g  17 g Oral Daily Dustin Mix MD        sennosides-docusate sodium (SENOKOT-S) 8.6-50 MG tablet 1 tablet  1 tablet Oral BID Dustin Mix MD   1 tablet at 05/02/25 2015    magnesium hydroxide (MILK OF MAGNESIA) 400 MG/5ML suspension 30 mL  30 mL Oral Daily PRN Dustin Mix MD        bisacodyl (DULCOLAX) suppository 10 mg  10 mg Rectal Daily PRN Dustin Mix MD        aluminum & magnesium hydroxide-simethicone (MAALOX PLUS) 200-200-20 MG/5ML suspension 30 mL  30 mL Oral Q6H PRN Dustin Mix MD        acetaminophen (TYLENOL) tablet 650 mg  650 mg Oral Q6H PRN Dustin Mix MD        Or    acetaminophen (TYLENOL) suppository 650 mg  650 mg Rectal Q6H PRN Dustin Mix MD        lactated ringers infusion   IntraVENous Continuous Sky Keene  mL/hr at 05/03/25 0606 Associate Infusion Device at 05/03/25 0606    pantoprazole (PROTONIX) 40 mg in sodium chloride (PF) 0.9 % 10 mL injection  40 mg IntraVENous Daily Sky Keene MD   40 mg at 05/02/25 1308    HYDROcodone-acetaminophen (NORCO) 5-325 MG per tablet 1 tablet  1 tablet Oral Q4H PRN Sky Keene MD   1 tablet at 05/03/25 0107    oxyCODONE (ROXICODONE) immediate release tablet 5 mg  5 mg Oral Q6H Sky Keene MD   5 mg at 05/03/25 0423    nafcillin 2,000 mg in sodium chloride 0.9 % 100 mL IVPB ([id number])  2,000 mg IntraVENous Q4H Kassy Hurt MD   Stopped at 05/03/25 0636       PRN Medications  sodium chloride flush, sodium chloride, ondansetron 
valve was not well visualized.      Pericardial Effusion   No evidence of pericardial effusion. Pericardium appears normal.      Pleural Effusion   No evidence of pleural effusion.      Aorta   Normal aortic root size and ascending aorta.   Miscellaneous   The inferior vena cava diameter is normal with normal respiratory   variation.      Conclusions      Summary   Mild left ventricular dilatation.   Severe global hypokinesis, abnormal septal motion, EF estimated about 30   +/-3%.   Stage I diastolic dysfunction.   Interatrial septum not well visualized but appears intact.   Normal right ventricle size and function.   There is mild to moderate regurgitation - ERO 0.2cm2, PISA 0.7cm, RV 29cc.   No mitral valve prolapse.   No hemodynamically significant aortic stenosis is present.   There is mild tricuspid regurgitation, RVSP 18mmHg.   Normal aortic root size.   No evidence of pericardial effusion.   No intra cardiac mass or thrombus.   Compared to prior echo from 6/12/20 which showed EF 25%.      Signature      ----------------------------------------------------------------   Electronically signed by Dahiana Rand MD(Interpreting   physician) on 10/13/2020 12:20 PM   ----------------------------------------------------------------     Cardiac Catheterization 1/16/20:   INDICATION:  Severe LV dysfunction with severe mitral regurgitation.     PROCEDURE NOTE:  The patient was brought to the cardiac cath lab in her  usual fasting state.  Under sterile condition and under local anesthetic  and using conscious sedation, I failed to access the small right radial  artery.  Then, a 5-[place]vian micropuncture kit was used and a 5-Latvian  sheath was inserted into the right common femoral artery.  Then, a  5-[place]vian JL3.5 diagnostic catheter was advanced to the ascending aorta  and the left main coronary artery was engaged and a coronary angiogram  was done.  This catheter was exchanged over a guidewire to a 5-[place]vian  JR4 
sepsis  Esophageal narrowing/stricture?  Hypocalcemia  Hypokalemia        Plan:  MRI brain negative for acute process  CIERA unsuccessful d/t esophageal narrowing - consult to GI   Off vasopressors  Wean stress sterids today further and stop midodrine   Nafcilin per ID  Will likely need CRT extraction and I would recommend transfer to [facility] - will discuss with primary.  She does not have any further ICU needs   Glucose control<180  DVT prophylaxis on hold for thrombocytopenia - place SCDs in bed   Check retic count, LHD, haptoglobin, peripheral smear  Sotomayor management per urology   Electrolyte replacement  Prefer to stop IVF - will discuss with nephrology   Okay to intermediate floor - boarding in ICU x 24 hours     Vandana Robertson MD   
emergency medical condition->Emergency Medical Condition (MA) What reading provider will be dictating this exam?->CRC FINDINGS: Lower Chest: No infiltrate or effusion.  Biventricular pacer defibrillator. Organs: Liver, gallbladder, biliary tree, bilateral adrenal glands, bilateral kidneys, spleen and pancreas are  normal. GI/Bowel:  No ileus or obstruction. No inflammatory bowel process.Pan colonic diverticulosis.  Normal appendix right lower quadrant. Pelvis: No adnexal mass or pelvic fluid. Peritoneum/Retroperitoneum: Tiny, fatty umbilical hernia.  Normal aorta.  No adenopathy or ascites. Bones/Soft Tissues: No acute osseous or body wall soft tissue abnormalities.     1. No acute intra-abdominal or pelvic process. 2. Pan colonic diverticulosis.     XR CHEST PORTABLE  Result Date: 5/2/2025  EXAMINATION: ONE XRAY VIEW OF THE CHEST 5/2/2025 1:11 am COMPARISON: 12/18/2020 HISTORY: ORDERING SYSTEM PROVIDED HISTORY: Sepsis TECHNOLOGIST PROVIDED HISTORY: Reason for exam:->Sepsis FINDINGS: The lungs are without acute focal process.  There is no effusion or pneumothorax. The cardiomediastinal silhouette is without acute process. The osseous structures are without acute process.     No acute process.       All Others since admission  CT HEAD WO CONTRAST  Result Date: 5/3/2025  1. Small indeterminate area of low-density in the inferior left frontal lobe that probably represents an area of old encephalomalacia.  If clinically indicated, MRI of the brain is suggested for further evaluation. 2. Otherwise, no evidence of acute intracranial abnormality.     CT ABDOMEN PELVIS WO CONTRAST Additional Contrast? None  Result Date: 5/2/2025  1. No acute intra-abdominal or pelvic process. 2. Pan colonic diverticulosis.     XR CHEST PORTABLE  Result Date: 5/2/2025  No acute process.       EKG  :     Rhythm Strip :Normal Sinus Rhythm    ECHO  :                        Assessment and Plan of care     Diagnosis:  Nonischemic cardiomyopathy,

## 2025-05-20 LAB
ALBUMIN SERPL-MCNC: 3.6 G/DL (ref 3.5–5.2)
ALP SERPL-CCNC: 85 U/L (ref 35–104)
ALT SERPL-CCNC: 19 U/L (ref 0–35)
AST SERPL-CCNC: 26 U/L (ref 0–35)
BASOPHILS # BLD: 0.05 K/UL (ref 0–0.2)
BASOPHILS NFR BLD: 1 % (ref 0–2)
BILIRUB DIRECT SERPL-MCNC: 0.2 MG/DL (ref 0–0.2)
BILIRUB INDIRECT SERPL-MCNC: 0.1 MG/DL (ref 0–1)
BILIRUB SERPL-MCNC: 0.3 MG/DL (ref 0–1.2)
CREAT SERPL-MCNC: 1 MG/DL (ref 0.5–1)
CRP SERPL HS-MCNC: 32.7 MG/L (ref 0–5)
EOSINOPHIL # BLD: 0.01 K/UL (ref 0.05–0.5)
EOSINOPHILS RELATIVE PERCENT: 0 % (ref 0–6)
ERYTHROCYTE [DISTWIDTH] IN BLOOD BY AUTOMATED COUNT: 18.5 % (ref 11.5–15)
GFR, ESTIMATED: 61 ML/MIN/1.73M2
HCT VFR BLD AUTO: 28.9 % (ref 34–48)
HGB BLD-MCNC: 9.3 G/DL (ref 11.5–15.5)
IMM GRANULOCYTES # BLD AUTO: 0.07 K/UL (ref 0–0.58)
IMM GRANULOCYTES NFR BLD: 1 % (ref 0–5)
LYMPHOCYTES NFR BLD: 0.66 K/UL (ref 1.5–4)
LYMPHOCYTES RELATIVE PERCENT: 9 % (ref 20–42)
MCH RBC QN AUTO: 28.7 PG (ref 26–35)
MCHC RBC AUTO-ENTMCNC: 32.2 G/DL (ref 32–34.5)
MCV RBC AUTO: 89.2 FL (ref 80–99.9)
MONOCYTES NFR BLD: 0.45 K/UL (ref 0.1–0.95)
MONOCYTES NFR BLD: 6 % (ref 2–12)
NEUTROPHILS NFR BLD: 82 % (ref 43–80)
NEUTS SEG NFR BLD: 5.78 K/UL (ref 1.8–7.3)
PLATELET # BLD AUTO: 395 K/UL (ref 130–450)
PMV BLD AUTO: 11.1 FL (ref 7–12)
PROT SERPL-MCNC: 8 G/DL (ref 6.4–8.3)
RBC # BLD AUTO: 3.24 M/UL (ref 3.5–5.5)
WBC OTHER # BLD: 7 K/UL (ref 4.5–11.5)

## 2025-05-22 ENCOUNTER — TELEPHONE (OUTPATIENT)
Dept: NON INVASIVE DIAGNOSTICS | Age: 65
End: 2025-05-22

## 2025-05-22 NOTE — TELEPHONE ENCOUNTER
Call from patient stating that she had her device removed at the [facility] due to infection. She wanted to let us know so we can remove her from the remote carelink site. They did not reimplant the device.     Plan:   Archived in [place].   Removed from [facility].   Keep 9/2025 appointment with NITZA RAI,RN   [facility] [place]   Device Clinic

## 2025-05-27 LAB
ALBUMIN SERPL-MCNC: 3.9 G/DL (ref 3.5–5.2)
ALP SERPL-CCNC: 87 U/L (ref 35–104)
ALT SERPL-CCNC: 18 U/L (ref 0–35)
AST SERPL-CCNC: 34 U/L (ref 0–35)
BASOPHILS # BLD: 0.05 K/UL (ref 0–0.2)
BASOPHILS NFR BLD: 1 % (ref 0–2)
BILIRUB DIRECT SERPL-MCNC: 0.1 MG/DL (ref 0–0.2)
BILIRUB INDIRECT SERPL-MCNC: 0.1 MG/DL (ref 0–1)
BILIRUB SERPL-MCNC: 0.2 MG/DL (ref 0–1.2)
CREAT SERPL-MCNC: 0.8 MG/DL (ref 0.5–1)
CRP SERPL HS-MCNC: 13.9 MG/L (ref 0–5)
EOSINOPHIL # BLD: 0.06 K/UL (ref 0.05–0.5)
EOSINOPHILS RELATIVE PERCENT: 1 % (ref 0–6)
ERYTHROCYTE [DISTWIDTH] IN BLOOD BY AUTOMATED COUNT: 18.9 % (ref 11.5–15)
GFR, ESTIMATED: 78 ML/MIN/1.73M2
HCT VFR BLD AUTO: 30.6 % (ref 34–48)
HGB BLD-MCNC: 9.9 G/DL (ref 11.5–15.5)
IMM GRANULOCYTES # BLD AUTO: 0.04 K/UL (ref 0–0.58)
IMM GRANULOCYTES NFR BLD: 1 % (ref 0–5)
LYMPHOCYTES NFR BLD: 0.84 K/UL (ref 1.5–4)
LYMPHOCYTES RELATIVE PERCENT: 13 % (ref 20–42)
MCH RBC QN AUTO: 28.9 PG (ref 26–35)
MCHC RBC AUTO-ENTMCNC: 32.4 G/DL (ref 32–34.5)
MCV RBC AUTO: 89.2 FL (ref 80–99.9)
MONOCYTES NFR BLD: 0.55 K/UL (ref 0.1–0.95)
MONOCYTES NFR BLD: 9 % (ref 2–12)
NEUTROPHILS NFR BLD: 76 % (ref 43–80)
NEUTS SEG NFR BLD: 4.92 K/UL (ref 1.8–7.3)
PLATELET # BLD AUTO: 478 K/UL (ref 130–450)
PMV BLD AUTO: 10.5 FL (ref 7–12)
PROT SERPL-MCNC: 8.2 G/DL (ref 6.4–8.3)
RBC # BLD AUTO: 3.43 M/UL (ref 3.5–5.5)
WBC OTHER # BLD: 6.5 K/UL (ref 4.5–11.5)

## 2025-06-03 LAB
ALBUMIN SERPL-MCNC: 3.8 G/DL (ref 3.5–5.2)
ALP SERPL-CCNC: 79 U/L (ref 35–104)
ALT SERPL-CCNC: 14 U/L (ref 0–35)
AST SERPL-CCNC: 28 U/L (ref 0–35)
BASOPHILS # BLD: 0.05 K/UL (ref 0–0.2)
BASOPHILS NFR BLD: 1 % (ref 0–2)
BILIRUB DIRECT SERPL-MCNC: 0.1 MG/DL (ref 0–0.2)
BILIRUB INDIRECT SERPL-MCNC: 0.2 MG/DL (ref 0–1)
BILIRUB SERPL-MCNC: 0.3 MG/DL (ref 0–1.2)
CREAT SERPL-MCNC: 0.8 MG/DL (ref 0.5–1)
CRP SERPL HS-MCNC: 7.5 MG/L (ref 0–5)
EOSINOPHIL # BLD: 0.04 K/UL (ref 0.05–0.5)
EOSINOPHILS RELATIVE PERCENT: 1 % (ref 0–6)
ERYTHROCYTE [DISTWIDTH] IN BLOOD BY AUTOMATED COUNT: 18.7 % (ref 11.5–15)
GFR, ESTIMATED: 86 ML/MIN/1.73M2
HCT VFR BLD AUTO: 30.6 % (ref 34–48)
HGB BLD-MCNC: 10.2 G/DL (ref 11.5–15.5)
IMM GRANULOCYTES # BLD AUTO: <0.03 K/UL (ref 0–0.58)
IMM GRANULOCYTES NFR BLD: 0 % (ref 0–5)
LYMPHOCYTES NFR BLD: 0.76 K/UL (ref 1.5–4)
LYMPHOCYTES RELATIVE PERCENT: 12 % (ref 20–42)
MCH RBC QN AUTO: 29.7 PG (ref 26–35)
MCHC RBC AUTO-ENTMCNC: 33.3 G/DL (ref 32–34.5)
MCV RBC AUTO: 89.2 FL (ref 80–99.9)
MONOCYTES NFR BLD: 0.45 K/UL (ref 0.1–0.95)
MONOCYTES NFR BLD: 7 % (ref 2–12)
NEUTROPHILS NFR BLD: 79 % (ref 43–80)
NEUTS SEG NFR BLD: 5.06 K/UL (ref 1.8–7.3)
PLATELET # BLD AUTO: 396 K/UL (ref 130–450)
PMV BLD AUTO: 10 FL (ref 7–12)
PROT SERPL-MCNC: 7.6 G/DL (ref 6.4–8.3)
RBC # BLD AUTO: 3.43 M/UL (ref 3.5–5.5)
WBC OTHER # BLD: 6.4 K/UL (ref 4.5–11.5)

## 2025-06-09 ENCOUNTER — TELEPHONE (OUTPATIENT)
Dept: NON INVASIVE DIAGNOSTICS | Age: 65
End: 2025-06-09

## 2025-06-09 NOTE — TELEPHONE ENCOUNTER
Patient's ICD was removed at [facility]. She wanted to know what to do with her transmitter. I gave her the number to call [employer] regarding this. They may send her a  to send her old monitor back. She will contact [employer].    Trista Barfield RN, BSN  [facility] [place]   Device Clinic

## 2025-06-10 LAB
ALBUMIN SERPL-MCNC: 4.1 G/DL (ref 3.5–5.2)
ALP SERPL-CCNC: 67 U/L (ref 35–104)
ALT SERPL-CCNC: 17 U/L (ref 0–35)
AST SERPL-CCNC: 27 U/L (ref 0–35)
BASOPHILS # BLD: 0.04 K/UL (ref 0–0.2)
BASOPHILS NFR BLD: 1 % (ref 0–2)
BILIRUB DIRECT SERPL-MCNC: 0.1 MG/DL (ref 0–0.2)
BILIRUB INDIRECT SERPL-MCNC: 0.2 MG/DL (ref 0–1)
BILIRUB SERPL-MCNC: 0.3 MG/DL (ref 0–1.2)
CREAT SERPL-MCNC: 0.7 MG/DL (ref 0.5–1)
CRP SERPL HS-MCNC: <3 MG/L (ref 0–5)
EOSINOPHIL # BLD: 0.06 K/UL (ref 0.05–0.5)
EOSINOPHILS RELATIVE PERCENT: 1 % (ref 0–6)
ERYTHROCYTE [DISTWIDTH] IN BLOOD BY AUTOMATED COUNT: 18.6 % (ref 11.5–15)
GFR, ESTIMATED: >90 ML/MIN/1.73M2
HCT VFR BLD AUTO: 32.3 % (ref 34–48)
HGB BLD-MCNC: 10.8 G/DL (ref 11.5–15.5)
IMM GRANULOCYTES # BLD AUTO: <0.03 K/UL (ref 0–0.58)
IMM GRANULOCYTES NFR BLD: 0 % (ref 0–5)
LYMPHOCYTES NFR BLD: 1.03 K/UL (ref 1.5–4)
LYMPHOCYTES RELATIVE PERCENT: 16 % (ref 20–42)
MCH RBC QN AUTO: 30.8 PG (ref 26–35)
MCHC RBC AUTO-ENTMCNC: 33.4 G/DL (ref 32–34.5)
MCV RBC AUTO: 92 FL (ref 80–99.9)
MONOCYTES NFR BLD: 0.32 K/UL (ref 0.1–0.95)
MONOCYTES NFR BLD: 5 % (ref 2–12)
NEUTROPHILS NFR BLD: 77 % (ref 43–80)
NEUTS SEG NFR BLD: 4.82 K/UL (ref 1.8–7.3)
PLATELET # BLD AUTO: 370 K/UL (ref 130–450)
PMV BLD AUTO: 10.4 FL (ref 7–12)
PROT SERPL-MCNC: 8 G/DL (ref 6.4–8.3)
RBC # BLD AUTO: 3.51 M/UL (ref 3.5–5.5)
WBC OTHER # BLD: 6.3 K/UL (ref 4.5–11.5)

## 2025-06-17 LAB
BASOPHILS # BLD: 0.03 K/UL (ref 0–0.2)
BASOPHILS NFR BLD: 1 % (ref 0–2)
EOSINOPHIL # BLD: 0.1 K/UL (ref 0.05–0.5)
EOSINOPHILS RELATIVE PERCENT: 2 % (ref 0–6)
ERYTHROCYTE [DISTWIDTH] IN BLOOD BY AUTOMATED COUNT: 17 % (ref 11.5–15)
HCT VFR BLD AUTO: 34.7 % (ref 34–48)
HGB BLD-MCNC: 11.7 G/DL (ref 11.5–15.5)
IMM GRANULOCYTES # BLD AUTO: <0.03 K/UL (ref 0–0.58)
IMM GRANULOCYTES NFR BLD: 0 % (ref 0–5)
LYMPHOCYTES NFR BLD: 1.01 K/UL (ref 1.5–4)
LYMPHOCYTES RELATIVE PERCENT: 18 % (ref 20–42)
MCH RBC QN AUTO: 30.8 PG (ref 26–35)
MCHC RBC AUTO-ENTMCNC: 33.7 G/DL (ref 32–34.5)
MCV RBC AUTO: 91.3 FL (ref 80–99.9)
MONOCYTES NFR BLD: 0.4 K/UL (ref 0.1–0.95)
MONOCYTES NFR BLD: 7 % (ref 2–12)
NEUTROPHILS NFR BLD: 73 % (ref 43–80)
NEUTS SEG NFR BLD: 4.14 K/UL (ref 1.8–7.3)
PLATELET # BLD AUTO: 370 K/UL (ref 130–450)
PMV BLD AUTO: 10 FL (ref 7–12)
RBC # BLD AUTO: 3.8 M/UL (ref 3.5–5.5)
WBC OTHER # BLD: 5.7 K/UL (ref 4.5–11.5)

## 2025-06-18 LAB
ALBUMIN SERPL-MCNC: 4.1 G/DL (ref 3.5–5.2)
ALP SERPL-CCNC: 69 U/L (ref 35–104)
ALT SERPL-CCNC: 15 U/L (ref 0–35)
AST SERPL-CCNC: 26 U/L (ref 0–35)
BILIRUB DIRECT SERPL-MCNC: 0.1 MG/DL (ref 0–0.2)
BILIRUB INDIRECT SERPL-MCNC: 0.1 MG/DL (ref 0–1)
BILIRUB SERPL-MCNC: 0.2 MG/DL (ref 0–1.2)
CREAT SERPL-MCNC: 0.8 MG/DL (ref 0.5–1)
CRP SERPL HS-MCNC: <3 MG/L (ref 0–5)
GFR, ESTIMATED: 79 ML/MIN/1.73M2
PROT SERPL-MCNC: 8 G/DL (ref 6.4–8.3)

## 2025-08-08 ENCOUNTER — HOSPITAL ENCOUNTER (OUTPATIENT)
Dept: CARDIOLOGY | Age: 65
Discharge: HOME | End: 2025-08-10
Payer: COMMERCIAL

## 2025-08-08 VITALS
SYSTOLIC BLOOD PRESSURE: 105 MMHG | DIASTOLIC BLOOD PRESSURE: 71 MMHG | HEIGHT: 63 IN | BODY MASS INDEX: 21.26 KG/M2 | WEIGHT: 120 LBS

## 2025-08-08 DIAGNOSIS — I50.9 CONGESTIVE HEART FAILURE (CHF) (HCC): ICD-10-CM

## 2025-08-08 LAB
ECHO AV CUSP MM: 1.3 CM
ECHO AV MEAN GRADIENT: 2 MMHG
ECHO AV MEAN VELOCITY: 0.8 M/S
ECHO AV PEAK GRADIENT: 6 MMHG
ECHO AV PEAK VELOCITY: 1.2 M/S
ECHO AV VTI: 19.9 CM
ECHO BSA: 1.56 M2
ECHO EST RA PRESSURE: 3 MMHG
ECHO LA DIAMETER INDEX: 1.54 CM/M2
ECHO LA DIAMETER: 2.4 CM
ECHO LA VOL A-L A2C: 31 ML (ref 22–52)
ECHO LA VOL A-L A4C: 29 ML (ref 22–52)
ECHO LA VOL MOD A2C: 30 ML (ref 22–52)
ECHO LA VOL MOD A4C: 27 ML (ref 22–52)
ECHO LA VOLUME AREA LENGTH: 31 ML
ECHO LA VOLUME INDEX A-L A2C: 20 ML/M2 (ref 16–34)
ECHO LA VOLUME INDEX A-L A4C: 19 ML/M2 (ref 16–34)
ECHO LA VOLUME INDEX AREA LENGTH: 20 ML/M2 (ref 16–34)
ECHO LA VOLUME INDEX MOD A2C: 19 ML/M2 (ref 16–34)
ECHO LA VOLUME INDEX MOD A4C: 17 ML/M2 (ref 16–34)
ECHO LV EDV A2C: 61 ML
ECHO LV EDV A4C: 77 ML
ECHO LV EDV BP: 69 ML (ref 56–104)
ECHO LV EDV INDEX A4C: 49 ML/M2
ECHO LV EDV INDEX BP: 44 ML/M2
ECHO LV EDV NDEX A2C: 39 ML/M2
ECHO LV EF PHYSICIAN: 35 %
ECHO LV EJECTION FRACTION A2C: 34 %
ECHO LV EJECTION FRACTION A4C: 37 %
ECHO LV EJECTION FRACTION BIPLANE: 36 % (ref 55–100)
ECHO LV ESV A2C: 40 ML
ECHO LV ESV A4C: 48 ML
ECHO LV ESV BP: 44 ML (ref 19–49)
ECHO LV ESV INDEX A2C: 26 ML/M2
ECHO LV ESV INDEX A4C: 31 ML/M2
ECHO LV ESV INDEX BP: 28 ML/M2
ECHO LV FRACTIONAL SHORTENING: 16 % (ref 28–44)
ECHO LV INTERNAL DIMENSION DIASTOLE INDEX: 2.76 CM/M2
ECHO LV INTERNAL DIMENSION DIASTOLIC: 4.3 CM (ref 3.9–5.3)
ECHO LV INTERNAL DIMENSION SYSTOLIC INDEX: 2.31 CM/M2
ECHO LV INTERNAL DIMENSION SYSTOLIC: 3.6 CM
ECHO LV IVSD: 0.9 CM (ref 0.6–0.9)
ECHO LV MASS 2D: 123.3 G (ref 67–162)
ECHO LV MASS INDEX 2D: 79 G/M2 (ref 43–95)
ECHO LV POSTERIOR WALL DIASTOLIC: 0.9 CM (ref 0.6–0.9)
ECHO LV RELATIVE WALL THICKNESS RATIO: 0.42
ECHO MV A VELOCITY: 0.69 M/S
ECHO MV E DECELERATION TIME (DT): 168.6 MS
ECHO MV E VELOCITY: 0.5 M/S
ECHO MV E/A RATIO: 0.72
ECHO MV MAX VELOCITY: 0.7 M/S
ECHO MV MEAN GRADIENT: 1 MMHG
ECHO MV MEAN VELOCITY: 0.4 M/S
ECHO MV PEAK GRADIENT: 2 MMHG
ECHO MV VTI: 19.6 CM
ECHO RIGHT VENTRICULAR SYSTOLIC PRESSURE (RVSP): 19 MMHG
ECHO RV INTERNAL DIMENSION: 2.2 CM
ECHO TV REGURGITANT MAX VELOCITY: 2.03 M/S
ECHO TV REGURGITANT PEAK GRADIENT: 17 MMHG

## 2025-08-08 PROCEDURE — 93306 TTE W/DOPPLER COMPLETE: CPT

## 2025-08-08 PROCEDURE — 93306 TTE W/DOPPLER COMPLETE: CPT | Performed by: INTERNAL MEDICINE
